# Patient Record
Sex: MALE | Race: WHITE | NOT HISPANIC OR LATINO | Employment: OTHER | URBAN - NONMETROPOLITAN AREA
[De-identification: names, ages, dates, MRNs, and addresses within clinical notes are randomized per-mention and may not be internally consistent; named-entity substitution may affect disease eponyms.]

---

## 2017-03-06 ENCOUNTER — CONVERSION ENCOUNTER (OUTPATIENT)
Dept: UROLOGY | Facility: CLINIC | Age: 82
End: 2017-03-06

## 2017-03-07 LAB — PSA SERPL-MCNC: 4.2 NG/ML (ref 0–4)

## 2017-03-08 ENCOUNTER — OFFICE VISIT (OUTPATIENT)
Dept: UROLOGY | Facility: CLINIC | Age: 82
End: 2017-03-08

## 2017-03-08 DIAGNOSIS — C61 PROSTATE CANCER (HCC): Primary | ICD-10-CM

## 2017-03-08 DIAGNOSIS — I48.91 ATRIAL FIBRILLATION (HCC): ICD-10-CM

## 2017-03-08 PROCEDURE — 99213 OFFICE O/P EST LOW 20 MIN: CPT | Performed by: UROLOGY

## 2017-03-08 NOTE — PROGRESS NOTES
Chief Complaint:          Chief Complaint   Patient presents with   • Prostate Cancer     3 month f/u       HPI:   81 y.o. male.    HPI  Pt had hifu 5 years agone and his psa zhane was 2 ine 2/15.  He psa has increased to 3 on 9/16 amd 3/9 on 12/1/16 and now in 3/17 it is 4.2      Past Medical History:        Past Medical History   Diagnosis Date   • Atrial fibrillation    • Coronary artery disease    • Hyperlipidemia    • Hypertension    • Prostate cancer          Current Meds:     Current Outpatient Prescriptions   Medication Sig Dispense Refill   • amLODIPine (NORVASC) 5 MG tablet TAKE ONE TABLET BY MOUTH DAILY **CAN INCREASE TO ONE AND ONE-HALF TABLET DAILY FOR INCREASED BLOOD PRESSURE** 45 tablet 5   • apixaban (ELIQUIS) 5 MG tablet tablet Take 1 tablet by mouth 2 (two) times a day. 60 tablet 5   • Bioflavonoid Products (SCOTT-C) 500-550 MG tablet Take  by mouth daily.     • Cholecalciferol (VITAMIN D3) 5000 UNITS capsule capsule Take  by mouth daily.     • flecainide (TAMBOCOR) 100 MG tablet Take 1 tablet by mouth 2 (two) times a day. 180 tablet 3   • folic acid (FOLVITE) 800 MCG tablet Take 800 mcg by mouth daily.     • levothyroxine (SYNTHROID, LEVOTHROID) 50 MCG tablet 50 mcg daily.     • Misc Natural Products (CURCUMAX PRO PO) Take  by mouth daily.     • niacin (NIACIN SR,NIASPAN ER) 500 MG CR capsule Take 500 mg by mouth daily.     • triamterene-hydrochlorothiazide (MAXZIDE-25) 37.5-25 MG per tablet Take 1 tablet by mouth daily.       No current facility-administered medications for this visit.         Allergies:      No Known Allergies     Past Surgical History:     Past Surgical History   Procedure Laterality Date   • Hernia repair     • Cardiac catheterization           Social History:     Social History     Social History   • Marital status:      Spouse name: N/A   • Number of children: N/A   • Years of education: N/A     Occupational History   • Not on file.     Social History Main Topics   •  Smoking status: Never Smoker   • Smokeless tobacco: Never Used   • Alcohol use No   • Drug use: No   • Sexual activity: Defer     Other Topics Concern   • Not on file     Social History Narrative       Family History:     Family History   Problem Relation Age of Onset   • Kidney disease Mother    • Sudden death Other    • Cancer Other        Review of Systems:     Review of Systems   All other systems reviewed and are negative.      Physical Exam:     Physical Exam   Constitutional: He is oriented to person, place, and time.   HENT:   Head: Normocephalic and atraumatic.   Right Ear: External ear normal.   Left Ear: External ear normal.   Nose: Nose normal.   Mouth/Throat: Oropharynx is clear and moist.   Eyes: Conjunctivae and EOM are normal. Pupils are equal, round, and reactive to light.   Neck: Normal range of motion. Neck supple. No thyromegaly present.   Cardiovascular: Normal rate, regular rhythm, normal heart sounds and intact distal pulses.    No murmur heard.  Pulmonary/Chest: Effort normal and breath sounds normal. No respiratory distress. He has no wheezes. He has no rales. He exhibits no tenderness.   Abdominal: Soft. Bowel sounds are normal. He exhibits no distension and no mass. There is no tenderness. No hernia.   Genitourinary: Rectum normal and penis normal.   Genitourinary Comments: Flat prostate.   Musculoskeletal: Normal range of motion. He exhibits no edema or tenderness.   Lymphadenopathy:     He has no cervical adenopathy.   Neurological: He is alert and oriented to person, place, and time. No cranial nerve deficit. He exhibits normal muscle tone. Coordination normal.   Skin: Skin is warm. No rash noted.   Psychiatric: He has a normal mood and affect. His behavior is normal. Judgment and thought content normal.   Nursing note and vitals reviewed.      Procedure:     No notes on file      Assessment:     Encounter Diagnoses   Name Primary?   • Atrial fibrillation    • Prostate cancer Yes     No  orders of the defined types were placed in this encounter.      Plan:   Will repeat psa in 3 months.  Consider intermittent hormonal therapy.    Counseling was given to patient for the following topics instructions for management. and the interim medical history and current results were reviewed.  A treatment plan with follow-up was made. Total time of the encounter was 21 minutes and 21 minutes were spent discussing Prostate cancer [C61] face-to-face.        This document has been electronically signed by Brett Talavera MD March 8, 2017 10:46 AM

## 2017-03-20 ENCOUNTER — OFFICE VISIT (OUTPATIENT)
Dept: CARDIOLOGY | Facility: CLINIC | Age: 82
End: 2017-03-20

## 2017-03-20 VITALS
OXYGEN SATURATION: 99 % | WEIGHT: 232.4 LBS | HEIGHT: 74 IN | SYSTOLIC BLOOD PRESSURE: 151 MMHG | HEART RATE: 69 BPM | DIASTOLIC BLOOD PRESSURE: 71 MMHG | BODY MASS INDEX: 29.82 KG/M2

## 2017-03-20 DIAGNOSIS — I10 ESSENTIAL HYPERTENSION: ICD-10-CM

## 2017-03-20 DIAGNOSIS — I48.0 PAROXYSMAL ATRIAL FIBRILLATION (HCC): Primary | ICD-10-CM

## 2017-03-20 PROCEDURE — 99213 OFFICE O/P EST LOW 20 MIN: CPT | Performed by: PHYSICIAN ASSISTANT

## 2017-03-20 PROCEDURE — 93000 ELECTROCARDIOGRAM COMPLETE: CPT | Performed by: PHYSICIAN ASSISTANT

## 2017-03-20 RX ORDER — LISINOPRIL 5 MG/1
TABLET ORAL DAILY
Refills: 0 | COMMUNITY
Start: 2017-02-23 | End: 2017-07-07

## 2017-03-20 RX ORDER — LEVOTHYROXINE SODIUM 0.07 MG/1
TABLET ORAL DAILY
Refills: 0 | COMMUNITY
Start: 2017-03-17 | End: 2017-07-07

## 2017-03-20 NOTE — PROGRESS NOTES
Problem list     Subjective   Martín Campa is a 81 y.o. male     Chief Complaint   Patient presents with   • Follow-up     4 mo.   Problem List:  1. Atrial fibrillation with RVR during hospitalization, October 2014.  1.1 Status post cardioversion, December 2014.  1.2 Residual A flutter, despite increased dosing of Flecainide.  1.3 Repeat Cardioversion, 09/2016, with maintenance of SR with Flecainide.  2. 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 Hypertension  4. Dyslipidemia      5. Prostate cancer      6. First degree AVB       HPI  The patient presents back today for routine evaluation and follow-up.  He continues to do well from cardiac standpoint.  He has a degree of weakness and dyspnea with exertion, which is his baseline.  He has no chest pain.  He has had no rhythm disturbance symptoms since his last appointment here.  He has maintained sinus rhythm with cardioversion and flecainide, all as above.  He relates to no dizziness or syncope.  He is tolerating medications without complication.  He did recently see EP services who felt he was stable as well.  The patient has no further complaints otherwise.    Current Outpatient Prescriptions   Medication Sig Dispense Refill   • amLODIPine (NORVASC) 5 MG tablet TAKE ONE TABLET BY MOUTH DAILY **CAN INCREASE TO ONE AND ONE-HALF TABLET DAILY FOR INCREASED BLOOD PRESSURE** 45 tablet 5   • apixaban (ELIQUIS) 5 MG tablet tablet Take 1 tablet by mouth 2 (two) times a day. 60 tablet 5   • Bioflavonoid Products (SCOTT-C) 500-550 MG tablet Take  by mouth daily.     • Cholecalciferol (VITAMIN D3) 5000 UNITS capsule capsule Take  by mouth daily.     • flecainide (TAMBOCOR) 100 MG tablet Take 1 tablet by mouth 2 (two) times a day. 180 tablet 3   • folic acid (FOLVITE) 800 MCG tablet Take 800 mcg by mouth daily.     • levothyroxine (SYNTHROID, LEVOTHROID) 75 MCG tablet Daily.  0   • lisinopril (PRINIVIL,ZESTRIL) 5 MG tablet Daily.  0   • Misc Natural Products (CURCUMAX  "PRO PO) Take  by mouth daily.     • niacin (NIACIN SR,NIASPAN ER) 500 MG CR capsule Take 500 mg by mouth daily.     • triamterene-hydrochlorothiazide (MAXZIDE-25) 37.5-25 MG per tablet Take 1 tablet by mouth daily.       No current facility-administered medications for this visit.        Review of patient's allergies indicates no known allergies.    Past Medical History   Diagnosis Date   • Atrial fibrillation    • Coronary artery disease    • Hyperlipidemia    • Hypertension    • Prostate cancer        Social History     Social History   • Marital status:      Spouse name: N/A   • Number of children: N/A   • Years of education: N/A     Occupational History   • Not on file.     Social History Main Topics   • Smoking status: Never Smoker   • Smokeless tobacco: Never Used   • Alcohol use No   • Drug use: No   • Sexual activity: Defer     Other Topics Concern   • Not on file     Social History Narrative       Family History   Problem Relation Age of Onset   • Kidney disease Mother    • Sudden death Other    • Cancer Other        Review of Systems   Constitutional: Positive for fatigue.   HENT: Positive for nosebleeds (left nare).    Eyes: Positive for visual disturbance (wears glasses).   Respiratory: Positive for shortness of breath (with hills).    Cardiovascular: Positive for leg swelling (mildly). Negative for chest pain and palpitations.   Gastrointestinal: Negative.    Endocrine: Negative.    Genitourinary: Negative.    Musculoskeletal: Positive for arthralgias (knees) and myalgias.   Skin: Negative.    Allergic/Immunologic: Positive for environmental allergies (fine wood dust).   Neurological: Negative.    Hematological: Bruises/bleeds easily (just left nare).   Psychiatric/Behavioral: Negative.        Objective   Visit Vitals   • /71 (BP Location: Left arm, Patient Position: Sitting)   • Pulse 69   • Ht 74\" (188 cm)   • Wt 232 lb 6.4 oz (105 kg)   • SpO2 99%   • BMI 29.84 kg/m2     Lab Results " (most recent)     None        Physical Exam   Constitutional: He is oriented to person, place, and time. He appears well-developed and well-nourished. No distress.   HENT:   Head: Normocephalic and atraumatic.   Eyes: Conjunctivae and EOM are normal. Pupils are equal, round, and reactive to light.   Neck: Normal range of motion. Neck supple. No JVD present. No tracheal deviation present.   Cardiovascular: Normal rate, regular rhythm and intact distal pulses.    Murmur (Soft SHAD) heard.   Systolic murmur is present with a grade of 1/6   Pulmonary/Chest: Effort normal and breath sounds normal.   Abdominal: Soft. Bowel sounds are normal. He exhibits no distension and no mass. There is no tenderness. There is no rebound and no guarding.   Musculoskeletal: Normal range of motion. He exhibits no edema, tenderness or deformity.   Neurological: He is alert and oriented to person, place, and time.   Skin: Skin is warm and dry. No rash noted. No erythema. No pallor.   Psychiatric: He has a normal mood and affect. His behavior is normal. Judgment and thought content normal.   Nursing note and vitals reviewed.        Procedure     ECG 12 Lead  Date/Time: 3/20/2017 9:12 AM  Performed by: CARRILLO BEYER  Authorized by: CARRILLO BEYER   Comments: Sinus rhythm with first-degree AV block, CO interval at 304 ms, normal axis, early precordial R-wave progression, no acute changes noted.               Assessment/Plan      Diagnosis Plan   1. Paroxysmal atrial fibrillation  ECG 12 Lead   2. Essential hypertension         The patient is stable from cardiac standpoint.  I will make no changes at this time.  We will see him back in 6 months, sooner if indicated by course.  He will call for issues prior to follow-up.

## 2017-04-14 RX ORDER — AMLODIPINE BESYLATE 5 MG/1
5 TABLET ORAL DAILY
Qty: 45 TABLET | Refills: 5 | Status: SHIPPED | OUTPATIENT
Start: 2017-04-14 | End: 2018-01-09 | Stop reason: SDUPTHER

## 2017-05-31 ENCOUNTER — TELEPHONE (OUTPATIENT)
Dept: UROLOGY | Facility: CLINIC | Age: 82
End: 2017-05-31

## 2017-06-01 DIAGNOSIS — C61 PROSTATE CANCER (HCC): Primary | ICD-10-CM

## 2017-06-02 LAB — PSA SERPL-MCNC: 5.6 NG/ML (ref 0–4)

## 2017-06-06 ENCOUNTER — RESULTS ENCOUNTER (OUTPATIENT)
Dept: UROLOGY | Facility: CLINIC | Age: 82
End: 2017-06-06

## 2017-06-06 DIAGNOSIS — C61 PROSTATE CANCER (HCC): ICD-10-CM

## 2017-06-07 ENCOUNTER — OFFICE VISIT (OUTPATIENT)
Dept: UROLOGY | Facility: CLINIC | Age: 82
End: 2017-06-07

## 2017-06-07 VITALS
HEIGHT: 73 IN | DIASTOLIC BLOOD PRESSURE: 73 MMHG | WEIGHT: 227 LBS | HEART RATE: 78 BPM | SYSTOLIC BLOOD PRESSURE: 136 MMHG | BODY MASS INDEX: 30.09 KG/M2

## 2017-06-07 DIAGNOSIS — N39.41 URGENCY INCONTINENCE: ICD-10-CM

## 2017-06-07 DIAGNOSIS — C61 PROSTATE CANCER (HCC): Primary | ICD-10-CM

## 2017-06-07 PROCEDURE — 99213 OFFICE O/P EST LOW 20 MIN: CPT | Performed by: UROLOGY

## 2017-06-14 RX ORDER — FLECAINIDE ACETATE 100 MG/1
100 TABLET ORAL 2 TIMES DAILY
Qty: 180 TABLET | Refills: 3 | Status: SHIPPED | OUTPATIENT
Start: 2017-06-14 | End: 2018-03-19 | Stop reason: SDUPTHER

## 2017-06-15 DIAGNOSIS — I48.91 ATRIAL FIBRILLATION (HCC): ICD-10-CM

## 2017-06-15 RX ORDER — APIXABAN 5 MG/1
TABLET, FILM COATED ORAL
Qty: 60 TABLET | Refills: 5 | Status: SHIPPED | OUTPATIENT
Start: 2017-06-15 | End: 2017-06-16 | Stop reason: SDUPTHER

## 2017-06-16 DIAGNOSIS — I48.0 PAROXYSMAL ATRIAL FIBRILLATION (HCC): ICD-10-CM

## 2017-07-07 ENCOUNTER — OFFICE VISIT (OUTPATIENT)
Dept: CARDIOLOGY | Facility: CLINIC | Age: 82
End: 2017-07-07

## 2017-07-07 VITALS
BODY MASS INDEX: 29.16 KG/M2 | HEIGHT: 73 IN | HEART RATE: 49 BPM | SYSTOLIC BLOOD PRESSURE: 129 MMHG | DIASTOLIC BLOOD PRESSURE: 60 MMHG | WEIGHT: 220 LBS

## 2017-07-07 DIAGNOSIS — I48.0 PAROXYSMAL ATRIAL FIBRILLATION (HCC): Primary | ICD-10-CM

## 2017-07-07 DIAGNOSIS — I10 ESSENTIAL HYPERTENSION: ICD-10-CM

## 2017-07-07 PROCEDURE — 93000 ELECTROCARDIOGRAM COMPLETE: CPT | Performed by: PHYSICIAN ASSISTANT

## 2017-07-07 PROCEDURE — 99212 OFFICE O/P EST SF 10 MIN: CPT | Performed by: PHYSICIAN ASSISTANT

## 2017-07-07 RX ORDER — LISINOPRIL 5 MG/1
5 TABLET ORAL DAILY
COMMUNITY
End: 2022-10-13 | Stop reason: SDUPTHER

## 2017-07-07 RX ORDER — LEVOTHYROXINE SODIUM 0.05 MG/1
50 TABLET ORAL DAILY
COMMUNITY
End: 2017-09-18 | Stop reason: DRUGHIGH

## 2017-07-07 NOTE — PROGRESS NOTES
Subjective:   Martín Campa  1935  [phone]      07/07/2017    [facility] CARDIOLOGY    [place]atica MD  [address] [place] [id number]    REFERRING DOCTOR: Dr. Rosairo      Patient ID: Martín Campa is a 82 y.o. male resident of [place], [place]    Chief Complaint: Afib.    Problem List:  1. Atrial fibrillation with RVR during hospitalization, October 2014.   A. Status post cardioversion, December 2014. EF 60-65%   B. Had been initially started on amiodarone but stopped about one year ago (was on for 10 months) due to thyroid issues, on synthroid   C. Residual A flutter/Afib, despite increased dosing of Flecainide now on 100 mg BID. No recent cardioversions   D. CHADS-VASC= 3, On Eliquis 5mg BID. Recently decreased to 2.5 mg BID due to nose bleeds   E. On Eliquis 5 mg BID, Flec 100 mg BID ECV in Sept 2016. Maintaining SR and not feeling any real difference in Afib vs SR      2. 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 Hypertension  4. Dyslipidemia      5. Prostate cancer      No Known Allergies    Current Outpatient Prescriptions:   •  amLODIPine (NORVASC) 5 MG tablet, Take 1 tablet by mouth Daily., Disp: 45 tablet, Rfl: 5  •  apixaban (ELIQUIS) 5 MG tablet tablet, Take 1 tablet by mouth Every 12 (Twelve) Hours., Disp: 60 tablet, Rfl: 5  •  Bioflavonoid Products (SCOTT-C) 500-550 MG tablet, Take 1 tablet by mouth Daily., Disp: , Rfl:   •  Cholecalciferol (VITAMIN D3) 5000 UNITS capsule capsule, Take 5,000 Units by mouth Daily., Disp: , Rfl:   •  flecainide (TAMBOCOR) 100 MG tablet, Take 1 tablet by mouth 2 (Two) Times a Day., Disp: 180 tablet, Rfl: 3  •  folic acid (FOLVITE) 800 MCG tablet, Take 800 mcg by mouth daily., Disp: , Rfl:   •  levothyroxine (SYNTHROID, LEVOTHROID) 50 MCG tablet, Take 50 mcg by mouth Daily., Disp: , Rfl:   •  lisinopril (PRINIVIL,ZESTRIL) 5 MG tablet, Take 5 mg by mouth Daily., Disp: , Rfl:   •  Misc Natural Products (CURCUMAX PRO  "PO), Take  by mouth daily., Disp: , Rfl:   •  niacin (NIACIN SR,NIASPAN ER) 500 MG CR capsule, Take 500 mg by mouth daily., Disp: , Rfl:   •  triamterene-hydrochlorothiazide (MAXZIDE-25) 37.5-25 MG per tablet, Take 1 tablet by mouth daily., Disp: , Rfl:     History of Present Illness  The patient presents for follow up regarding history of afib.  He denies any recurrent episodes of afib.  He denies any palpiations, dizziness, near syncpope events.  He is having trouble with some prostate issues but otherwise he is doing well.       The following portions of the patient's history were reviewed and updated as appropriate: allergies, current medications, past family history, past medical history, past social history, past surgical history and problem list.    ROS   14 point ROS negative except as outlined in problem list, HPI and other parts of the note.      ECG 12 Lead  Date/Time: 7/7/2017 11:48 AM  Performed by: VAISHALI DÍAZ  Authorized by: VAISHALI DÍAZ   Rhythm: sinus rhythm  Ectopy: atrial premature contractions  Rate: normal  Conduction: conduction normal  Conduction: 1st degree  ST Segments: ST segments normal  T Waves: T waves normal  QRS axis: normal  Other: no other findings  Clinical impression: dysrhythmia - atrial               Objective:       Vitals:    07/07/17 1120   BP: 129/60   BP Location: Left arm   Patient Position: Sitting   Pulse: (!) 49   Weight: 220 lb (99.8 kg)   Height: 73\" (185.4 cm)       GENERAL: Well-developed, well-nourished patient in no acute distress.  HEENT: Normocephalic, atraumatic, PERRLA. Moist mucous membranes.  NECK: No JVD present at 30°. No carotid bruits auscultated.  LUNGS: Clear to auscultation.  CARDIOVASCULAR: Heart has a regular rate and rhythm. No murmurs, gallops or rubs noted.   ABDOMEN: Soft, nontender. Positive bowel sounds.  MUSCULOSKELETAL: No gross deformities. No clubbing, cyanosis, or lower extremity edema.  SKIN: Pink, warm  Neuro: Nonfocal exam. " Gait intact  Ext: No edema or bruising    The patient's old records including ambulatory rhythm recordings (ECGs, Holter/event monitor) were reviewed and discussed.      Lab Review:   Results for orders placed or performed in visit on 06/01/17   PSA   Result Value Ref Range    PSA 5.6 (H) 0.0 - 4.0 ng/mL           Diagnosis:   1. PAF - Controlled on Tambocor.  He is tolerating the medication well.    2. HTN- Controlled.       Assessment & Plan:     · Continue current medical therapy  · Return for follow up in 6 months or sooner as needed.            JESS Roger  07/07/17  11:46 AM

## 2017-09-05 ENCOUNTER — DOCUMENTATION (OUTPATIENT)
Dept: UROLOGY | Facility: CLINIC | Age: 82
End: 2017-09-05

## 2017-09-05 ENCOUNTER — OFFICE VISIT (OUTPATIENT)
Dept: UROLOGY | Facility: CLINIC | Age: 82
End: 2017-09-05

## 2017-09-05 VITALS — WEIGHT: 220 LBS | HEIGHT: 73 IN | BODY MASS INDEX: 29.16 KG/M2

## 2017-09-05 DIAGNOSIS — C61 PROSTATE CANCER (HCC): Primary | ICD-10-CM

## 2017-09-05 PROCEDURE — 99214 OFFICE O/P EST MOD 30 MIN: CPT | Performed by: UROLOGY

## 2017-09-05 PROCEDURE — 96402 CHEMO HORMON ANTINEOPL SQ/IM: CPT | Performed by: UROLOGY

## 2017-09-05 NOTE — PROGRESS NOTES
Chief Complaint:          Chief Complaint   Patient presents with   • Prostate Cancer     3 month follow up        HPI:   82 y.o. male.  Pt had HiFu for prostate cancer and his psa increased from 4.2 to 5.6 on 6/2017 this was done on June.  He has had lupron prior to his HIFU.  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  With him being 82 years old he and I agreed that radiation therapy would likely cause decreased quality of life compared to hormonal therapy with lupron.  HPI        Past Medical History:        Past Medical History:   Diagnosis Date   • Atrial fibrillation    • Coronary artery disease    • Hyperlipidemia    • Hypertension    • Prostate cancer          Current Meds:     Current Outpatient Prescriptions   Medication Sig Dispense Refill   • amLODIPine (NORVASC) 5 MG tablet Take 1 tablet by mouth Daily. 45 tablet 5   • apixaban (ELIQUIS) 5 MG tablet tablet Take 1 tablet by mouth Every 12 (Twelve) Hours. 60 tablet 5   • Bioflavonoid Products (SCOTT-C) 500-550 MG tablet Take 1 tablet by mouth Daily.     • Cholecalciferol (VITAMIN D3) 5000 UNITS capsule capsule Take 5,000 Units by mouth Daily.     • flecainide (TAMBOCOR) 100 MG tablet Take 1 tablet by mouth 2 (Two) Times a Day. 180 tablet 3   • folic acid (FOLVITE) 800 MCG tablet Take 800 mcg by mouth daily.     • levothyroxine (SYNTHROID, LEVOTHROID) 50 MCG tablet Take 50 mcg by mouth Daily.     • lisinopril (PRINIVIL,ZESTRIL) 5 MG tablet Take 5 mg by mouth Daily.     • Misc Natural Products (CURCUMAX PRO PO) Take  by mouth daily.     • niacin (NIACIN SR,NIASPAN ER) 500 MG CR capsule Take 500 mg by mouth daily.     • triamterene-hydrochlorothiazide (MAXZIDE-25) 37.5-25 MG per tablet Take 1 tablet by mouth daily.       No current facility-administered medications for this visit.         Allergies:      No Known Allergies     Past Surgical History:     Past Surgical History:   Procedure Laterality Date   •  CARDIAC CATHETERIZATION     • HERNIA REPAIR           Social History:     Social History     Social History   • Marital status:      Spouse name: N/A   • Number of children: N/A   • Years of education: N/A     Occupational History   • Not on file.     Social History Main Topics   • Smoking status: Never Smoker   • Smokeless tobacco: Never Used   • Alcohol use No   • Drug use: No   • Sexual activity: Defer     Other Topics Concern   • Not on file     Social History Narrative       Family History:     Family History   Problem Relation Age of Onset   • Kidney disease Mother    • Sudden death Other    • Cancer Other        Review of Systems:     Review of Systems    Physical Exam:     Physical Exam    Procedure:     No notes on file      Assessment:     Encounter Diagnosis   Name Primary?   • Prostate cancer Yes     No orders of the defined types were placed in this encounter.      Plan:   Will plan on lupron and will repeat his psa in 6 months    Counseling was given to patient for the following topics instructions for management. and the interim medical history and current results were reviewed.  A treatment plan with follow-up was made. Total time of the encounter was 21 minutes and 21 minutes were spent discussing Prostate cancer [C61] face-to-face.       This document has been electronically signed by Brett Talavera MD September 5, 2017 11:15 AM

## 2017-09-05 NOTE — PROGRESS NOTES
I called patients insurance and his 6 month lupron was approved for 1 year. Auth# [id number] 9/5/17 thru 9/5/18.

## 2017-09-06 DIAGNOSIS — N30.01 ACUTE CYSTITIS WITH HEMATURIA: Primary | ICD-10-CM

## 2017-09-06 RX ORDER — CIPROFLOXACIN 250 MG/1
250 TABLET, FILM COATED ORAL 2 TIMES DAILY
Qty: 20 TABLET | Refills: 0 | Status: SHIPPED | OUTPATIENT
Start: 2017-09-06 | End: 2017-09-18

## 2017-09-18 ENCOUNTER — OFFICE VISIT (OUTPATIENT)
Dept: CARDIOLOGY | Facility: CLINIC | Age: 82
End: 2017-09-18

## 2017-09-18 VITALS
SYSTOLIC BLOOD PRESSURE: 122 MMHG | HEIGHT: 73 IN | HEART RATE: 66 BPM | DIASTOLIC BLOOD PRESSURE: 78 MMHG | OXYGEN SATURATION: 100 % | WEIGHT: 226.6 LBS | BODY MASS INDEX: 30.03 KG/M2

## 2017-09-18 DIAGNOSIS — I10 ESSENTIAL HYPERTENSION: ICD-10-CM

## 2017-09-18 DIAGNOSIS — I25.10 CORONARY ARTERIOSCLEROSIS IN NATIVE ARTERY: Primary | ICD-10-CM

## 2017-09-18 DIAGNOSIS — I48.0 PAROXYSMAL ATRIAL FIBRILLATION (HCC): ICD-10-CM

## 2017-09-18 PROCEDURE — 99213 OFFICE O/P EST LOW 20 MIN: CPT | Performed by: PHYSICIAN ASSISTANT

## 2017-09-18 RX ORDER — LEVOTHYROXINE SODIUM 0.07 MG/1
75 TABLET ORAL DAILY
Refills: 0 | COMMUNITY
Start: 2017-08-14

## 2017-09-18 NOTE — PROGRESS NOTES
Problem list     Subjective   Martín Campa is a 82 y.o. male     Chief Complaint   Patient presents with   • Atrial Fibrillation     Here for 6 mo. f/u   • Coronary Artery Disease   • Hypertension   • Hyperlipidemia   Problem List:  1. Atrial fibrillation with RVR during hospitalization, October 2014.  1.1 Status post cardioversion, December 2014.  1.2 Residual A flutter, despite increased dosing of Flecainide.  1.3 Repeat Cardioversion, 09/2016, with maintenance of SR with Flecainide.  2. 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 Hypertension  4. Dyslipidemia      5. Prostate cancer      6. First degree AVB    HPI  The patient presents back today for evaluation follow-up.  Since last evaluation, he continues to do well.  He does have at least 2 episodes of epistaxis per week.  He has average that since starting anticoagulation therapy.  Currently, the patient relates to no chest pain.  He reports stable dyspnea.  He does not have dysrhythmic symptoms at this time.  He has no dizziness or syncope.  Blood pressures of been extremely well-controlled as of recent.  He has a component of lower extremity edema which is minimal by his report.  He had labs in March from his primary care provider and quotes as being normal, in particular his CBC, chemistry, TSH, and lipid parameters.  The patient has no further complaints otherwise and feels that he is doing well at this time.    Current Outpatient Prescriptions   Medication Sig Dispense Refill   • amLODIPine (NORVASC) 5 MG tablet Take 1 tablet by mouth Daily. 45 tablet 5   • apixaban (ELIQUIS) 5 MG tablet tablet Take 1 tablet by mouth Every 12 (Twelve) Hours. 60 tablet 5   • Bioflavonoid Products (SCOTT-C) 500-550 MG tablet Take 1 tablet by mouth Daily.     • Cholecalciferol (VITAMIN D3) 5000 UNITS capsule capsule Take 5,000 Units by mouth Daily.     • flecainide (TAMBOCOR) 100 MG tablet Take 1 tablet by mouth 2 (Two) Times a Day. 180 tablet 3   • folic acid  (FOLVITE) 800 MCG tablet Take 800 mcg by mouth daily.     • levothyroxine (SYNTHROID, LEVOTHROID) 75 MCG tablet Daily.  0   • lisinopril (PRINIVIL,ZESTRIL) 5 MG tablet Take 5 mg by mouth Daily.     • Misc Natural Products (CURCUMAX PRO PO) Take  by mouth daily.     • niacin (NIACIN SR,NIASPAN ER) 500 MG CR capsule Take 500 mg by mouth daily.     • triamterene-hydrochlorothiazide (MAXZIDE-25) 37.5-25 MG per tablet Take 1 tablet by mouth daily.       No current facility-administered medications for this visit.        Review of patient's allergies indicates no known allergies.    Past Medical History:   Diagnosis Date   • Atrial fibrillation    • Coronary artery disease    • Hyperlipidemia    • Hypertension    • Prostate cancer        Social History     Social History   • Marital status:      Spouse name: N/A   • Number of children: N/A   • Years of education: N/A     Occupational History   • Not on file.     Social History Main Topics   • Smoking status: Never Smoker   • Smokeless tobacco: Never Used   • Alcohol use No   • Drug use: No   • Sexual activity: Defer     Other Topics Concern   • Not on file     Social History Narrative       Family History   Problem Relation Age of Onset   • Kidney disease Mother    • Sudden death Other    • Cancer Other        Review of Systems   Constitutional: Negative.    HENT: Positive for nosebleeds (at least a couple x weekly).    Eyes: Positive for visual disturbance (wears glasses).   Respiratory: Negative.    Cardiovascular: Positive for leg swelling (slightly). Negative for chest pain and palpitations.   Gastrointestinal: Negative.    Endocrine: Negative.    Genitourinary: Negative.    Musculoskeletal: Positive for arthralgias and myalgias.   Skin: Negative.    Allergic/Immunologic: Negative.    Neurological: Negative.    Hematological: Bruises/bleeds easily.   Psychiatric/Behavioral: Negative.        Objective   /78 (BP Location: Left arm, Patient Position:  "Sitting)  Pulse 66  Ht 73\" (185.4 cm)  Wt 226 lb 9.6 oz (103 kg)  SpO2 100%  BMI 29.9 kg/m2  Lab Results (most recent)     None        Physical Exam   Constitutional: He is oriented to person, place, and time. He appears well-developed and well-nourished. No distress.   HENT:   Head: Normocephalic and atraumatic.   Eyes: Conjunctivae and EOM are normal. Pupils are equal, round, and reactive to light.   Neck: Normal range of motion. Neck supple. No JVD present. No tracheal deviation present.   Cardiovascular: Normal rate, regular rhythm and intact distal pulses.    Murmur (Soft SHAD) heard.   Systolic murmur is present with a grade of 1/6   Pulmonary/Chest: Effort normal and breath sounds normal.   Abdominal: Soft. Bowel sounds are normal. He exhibits no distension and no mass. There is no tenderness. There is no rebound and no guarding.   Musculoskeletal: Normal range of motion. He exhibits no edema, tenderness or deformity.   Neurological: He is alert and oriented to person, place, and time.   Skin: Skin is warm and dry. No rash noted. No erythema. No pallor.   Psychiatric: He has a normal mood and affect. His behavior is normal. Judgment and thought content normal.   Nursing note and vitals reviewed.        Procedure   Procedures       Assessment/Plan      Diagnosis Plan   1. Coronary arteriosclerosis in native artery     2. Paroxysmal atrial fibrillation     3. Essential hypertension         The patient is stable at this time.  I will make no changes in medications.  He does have a degree of lower extremity edema which I'll fill at least in part is related amlodipine therapy.  I discussed how to titrate that medication down if needed in the future.  He can increase lisinopril for better blood pressure control should he need to attempt that.  He is also interested in the consideration for Pradaxa therapy.  He is interested in that because recurrent reversal agent and his concern of bleed, given that he is a " [employer].  He would like to do some research on that and will call back should he decide to change that therapy.  Otherwise, nothing further to this time.  We will see him back in 6 months.

## 2017-10-02 ENCOUNTER — TELEPHONE (OUTPATIENT)
Dept: CARDIOLOGY | Facility: CLINIC | Age: 82
End: 2017-10-02

## 2017-10-02 NOTE — TELEPHONE ENCOUNTER
I spoke with Sha Richards PA-C regarding this patient, per verbal order patient was to restart medication today. I asked patient what [facility] had discussed with him, patient stated they do not think this was related to the Eliquis. They thought it was due to B/P event and dry sinus cavity. Patient stated for now he would like to continue Eliquis and does not want to change to Prodaxa at this time. Patient was notified if he had any further questions/issues to call our office, patient had no further question. -; SHON

## 2017-10-02 NOTE — TELEPHONE ENCOUNTER
Patient called stating he went to  and had nose packed due to bleeding. They notified patient to hold Eliquis till today and speak with our office about side effects and when to restart medications.

## 2018-01-09 RX ORDER — AMLODIPINE BESYLATE 5 MG/1
TABLET ORAL
Qty: 90 TABLET | Refills: 1 | Status: SHIPPED | OUTPATIENT
Start: 2018-01-09 | End: 2018-05-24 | Stop reason: SDUPTHER

## 2018-01-10 ENCOUNTER — OFFICE VISIT (OUTPATIENT)
Dept: CARDIOLOGY | Facility: CLINIC | Age: 83
End: 2018-01-10

## 2018-01-10 VITALS
WEIGHT: 223.8 LBS | OXYGEN SATURATION: 96 % | SYSTOLIC BLOOD PRESSURE: 122 MMHG | HEIGHT: 72 IN | BODY MASS INDEX: 30.31 KG/M2 | DIASTOLIC BLOOD PRESSURE: 69 MMHG | HEART RATE: 68 BPM

## 2018-01-10 DIAGNOSIS — R42 DIZZINESS: ICD-10-CM

## 2018-01-10 DIAGNOSIS — I48.92 ATRIAL FLUTTER, UNSPECIFIED TYPE (HCC): ICD-10-CM

## 2018-01-10 DIAGNOSIS — I10 ESSENTIAL HYPERTENSION: ICD-10-CM

## 2018-01-10 DIAGNOSIS — R06.02 SHORTNESS OF BREATH: ICD-10-CM

## 2018-01-10 DIAGNOSIS — I25.10 CORONARY ARTERIOSCLEROSIS IN NATIVE ARTERY: ICD-10-CM

## 2018-01-10 DIAGNOSIS — E78.5 DYSLIPIDEMIA: ICD-10-CM

## 2018-01-10 DIAGNOSIS — I48.0 PAROXYSMAL ATRIAL FIBRILLATION (HCC): Primary | ICD-10-CM

## 2018-01-10 DIAGNOSIS — R55 SYNCOPE, UNSPECIFIED SYNCOPE TYPE: ICD-10-CM

## 2018-01-10 PROCEDURE — 99214 OFFICE O/P EST MOD 30 MIN: CPT | Performed by: INTERNAL MEDICINE

## 2018-01-10 NOTE — PROGRESS NOTES
Subjective   Martín Campa is a 82 y.o. male     Chief Complaint   Patient presents with   • Loss of Consciousness     1 episode 2 weeks ago while walking, felt dizzy followed by syncope        PROBLEM LIST:     1. Atrial fibrillation with RVR during hospitalization, October 2014.  1.1 Status post cardioversion, December 2014.  1.2 Residual A flutter, despite increased dosing of Flecainide.  1.3 Repeat Cardioversion, 09/2016, with maintenance of SR with Flecainide.  2. 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 Hypertension  4. Dyslipidemia      5. Prostate cancer      6. First degree AVB      7. Syncope     Specialty Problems        Cardiology Problems    Atrial fibrillation        Coronary arteriosclerosis in native artery        Hypertension        Atrial flutter                HPI:  Mr. Campa returns today for follow-up because of an episode of syncope.    He was in his usual state of good health until several weeks ago.  He worked out on a stationary bicycle around lunchtime, having eaten only prior to that time.  At approximate 5 PM he was walking in the grocery store, suddenly became lightheaded, and awoke on the floor.  He describes no focal neurologic deficits prior to the episode of loss of consciousness.  He experienced no palpitations.  He was not vertiginous.  Per his description he was unconscious for only a very short period of time and he awoke without Rob's paralysis or post ictal state.    Mr. Campa  never had a similar episode previously.  However, since that episode he hasn't noted mild orthostatic lightheadedness.  He has had nor orthostatic symptoms prior.  He continues to be without chest discomfort, orthopnea, PND, or lower extremity edema.  Although he had had relatively frequent nosebleeds in the past, he has had no nosebleeds for the past month or more.  He has been compliant with medications.  He had no antecedent illnesses, injuries, or medication change.              CURRENT  MEDICATION:    Current Outpatient Prescriptions   Medication Sig Dispense Refill   • amLODIPine (NORVASC) 5 MG tablet take 1 tablet by mouth once daily 90 tablet 1   • apixaban (ELIQUIS) 5 MG tablet tablet Take 1 tablet by mouth Every 12 (Twelve) Hours. 60 tablet 5   • Cholecalciferol (VITAMIN D3) 5000 UNITS capsule capsule Take 5,000 Units by mouth Daily.     • flecainide (TAMBOCOR) 100 MG tablet Take 1 tablet by mouth 2 (Two) Times a Day. 180 tablet 3   • folic acid (FOLVITE) 800 MCG tablet Take 800 mcg by mouth daily.     • levothyroxine (SYNTHROID, LEVOTHROID) 75 MCG tablet Daily.  0   • lisinopril (PRINIVIL,ZESTRIL) 5 MG tablet Take 5 mg by mouth Daily.     • Misc Natural Products (CURCUMAX PRO PO) Take  by mouth daily.     • niacin (NIACIN SR,NIASPAN ER) 500 MG CR capsule Take 500 mg by mouth daily.     • triamterene-hydrochlorothiazide (MAXZIDE-25) 37.5-25 MG per tablet Take 1 tablet by mouth daily.     • Bioflavonoid Products (SCOTT-C) 500-550 MG tablet Take 1 tablet by mouth Daily.       No current facility-administered medications for this visit.        ALLERGIES:    Review of patient's allergies indicates no known allergies.    PAST MEDICAL HISTORY:    Past Medical History:   Diagnosis Date   • Atrial fibrillation    • Coronary artery disease    • Hyperlipidemia    • Hypertension    • Prostate cancer        SURGICAL HISTORY:    Past Surgical History:   Procedure Laterality Date   • CARDIAC CATHETERIZATION     • HERNIA REPAIR         SOCIAL HISTORY:    Social History     Social History   • Marital status:      Spouse name: N/A   • Number of children: N/A   • Years of education: N/A     Occupational History   • Not on file.     Social History Main Topics   • Smoking status: Never Smoker   • Smokeless tobacco: Never Used   • Alcohol use No   • Drug use: No   • Sexual activity: Defer     Other Topics Concern   • Not on file     Social History Narrative       FAMILY HISTORY:    Family History   Problem  "Relation Age of Onset   • Kidney disease Mother    • Sudden death Other    • Cancer Other        Review of Systems   Constitutional: Positive for fever (onset day after syncope epsiode ). Negative for chills, diaphoresis, fatigue and unexpected weight change.   HENT: Positive for nosebleeds (frequent. last episode 1 month ago ).    Eyes: Positive for visual disturbance (wears glasses).   Respiratory: Positive for cough (dry cough x 2 weeks ) and shortness of breath (dyspnea with exertion). Negative for choking, chest tightness, wheezing and stridor.    Cardiovascular: Positive for leg swelling (mild BLE). Negative for chest pain and palpitations.   Gastrointestinal: Positive for nausea (stomach virus day after syncope ) and vomiting. Negative for abdominal pain, blood in stool (no melena, no hematuria, no hematemesis, no hematochezia), constipation and diarrhea.   Endocrine: Negative.  Negative for cold intolerance and heat intolerance.   Genitourinary: Negative.    Musculoskeletal: Positive for arthralgias. Negative for myalgias.   Skin: Negative.  Negative for color change, pallor, rash and wound.   Neurological: Positive for dizziness, syncope (1 episode 2 weeks ago[facility] experienced dizziness with syncope. EMT arrived and Bp was 80/40  had head injury with 7 stables ) and light-headedness. Negative for tremors, seizures, facial asymmetry, speech difficulty, weakness, numbness and headaches.   Hematological: Negative.    Psychiatric/Behavioral: Negative.        VISIT VITALS:  Vitals:    01/10/18 1306 01/10/18 1308 01/10/18 1310   BP: 137/76 119/73 122/69   BP Location: Left arm Left arm Left arm   Patient Position: Lying Sitting Standing   Pulse: 71 68 68   SpO2: 96%     Weight: 102 kg (223 lb 12.8 oz)     Height: 182.9 cm (72\")        /69 (BP Location: Left arm, Patient Position: Standing)  Pulse 68  Ht 182.9 cm (72\")  Wt 102 kg (223 lb 12.8 oz)  SpO2 96%  BMI 30.35 " kg/m2    RECENT LABS:    Objective       Physical Exam   Constitutional: He is oriented to person, place, and time. He appears well-developed and well-nourished. No distress.   HENT:   Head: Normocephalic and atraumatic.   Eyes: Conjunctivae and EOM are normal. Pupils are equal, round, and reactive to light.   Neck: Normal range of motion. Neck supple. Normal carotid pulses, no hepatojugular reflux and no JVD present. Carotid bruit is not present (normal carotid upstrokes ). No tracheal deviation present. No thyroid mass and no thyromegaly present.   Cardiovascular: Normal rate, regular rhythm and intact distal pulses.  Exam reveals distant heart sounds. Exam reveals no gallop and no friction rub.    No murmur heard.  Pulmonary/Chest: Effort normal and breath sounds normal. No respiratory distress. He has no decreased breath sounds. He has no wheezes. He has no rhonchi. He has no rales. He exhibits no tenderness.   Abdominal: Soft. Bowel sounds are normal. He exhibits no distension, no abdominal bruit and no mass. There is no tenderness. There is no rebound and no guarding.   Negative organomegaly      Musculoskeletal: Normal range of motion. He exhibits edema (1+ edema to sockline BLE, mild venostasis changes BLE). He exhibits no tenderness or deformity.   Lymphadenopathy:     He has no cervical adenopathy.     He has no axillary adenopathy.   Neurological: He is alert and oriented to person, place, and time. He has normal reflexes.   Skin: Skin is warm and dry. No rash noted. No erythema. No pallor.   Psychiatric: He has a normal mood and affect. His speech is normal and behavior is normal. Judgment and thought content normal. Cognition and memory are normal.   Nursing note and vitals reviewed.      Procedures      Assessment/Plan    #1.  Syncope.  This is likely a Dewitt Lange attack in this patient with known conduction system disease.  However, other etiologies need to be determined.  The patient describes no  symptoms to suggest carotid sinus hypersensitivity, and no symptoms of upper extremity claudication 2 impugn  subclavian steal.    #2.  Therefore, we'll have the patient wear thirty-day event monitor.    #3.  I would like to check a d-dimer is non-obtained during the patient's emergency room visit.    #4.  As this was an isolated event, may have been related to the patient's food intake (or lack thereof, if there is no evidence of significant tachycardia or bradycardia dysrhythmias, I would not feel compelled to pursue an etiology further.  However, if symptoms recur or thrill evaluation to include risk assessment from the standpoint of ischemia, evaluating LV systolic and diastolic and pulmonary pressures would need to be considered.    #5.  Mr. Campa will follow-up with Dr. Gatica as instructed, and in our office after testing or on a when necessary basis for symptoms as discussed.                 Diagnosis Plan   1. Paroxysmal atrial fibrillation     2. Essential hypertension     3. Dyslipidemia     4. Syncope, unspecified syncope type     5. Shortness of breath     6. Dizziness         No Follow-up on file.         Denise Rosa MA   Scribed for Dr. Nahid Rosario by CHANTE Ta January 10, 2018 1:42 PM               Electronically signed by:            This note is dictated utilizing voice recognition software.  Although this record has been proof read, transcriptional errors may still be present. If questions occur regarding the content of this record please do not hesitate to call our office.

## 2018-02-02 ENCOUNTER — OFFICE VISIT (OUTPATIENT)
Dept: CARDIOLOGY | Facility: CLINIC | Age: 83
End: 2018-02-02

## 2018-02-02 VITALS
HEART RATE: 75 BPM | WEIGHT: 217 LBS | BODY MASS INDEX: 28.76 KG/M2 | SYSTOLIC BLOOD PRESSURE: 142 MMHG | DIASTOLIC BLOOD PRESSURE: 64 MMHG | HEIGHT: 73 IN

## 2018-02-02 DIAGNOSIS — I10 ESSENTIAL HYPERTENSION: ICD-10-CM

## 2018-02-02 DIAGNOSIS — I48.0 PAROXYSMAL ATRIAL FIBRILLATION (HCC): Primary | ICD-10-CM

## 2018-02-02 DIAGNOSIS — R55 SYNCOPE, UNSPECIFIED SYNCOPE TYPE: ICD-10-CM

## 2018-02-02 PROCEDURE — 99213 OFFICE O/P EST LOW 20 MIN: CPT | Performed by: INTERNAL MEDICINE

## 2018-02-02 NOTE — PROGRESS NOTES
Subjective:   Martín Campa  1935  [phone]      02/02/2018    [facility] CARDIOLOGY    West Gatica MD  [address] [place], [place] [id number]      Patient ID: Martín Campa is a 82 y.o. male.    Chief Complaint:   Chief Complaint   Patient presents with   • Atrial Fibrillation     Problem List:  1. Atrial fibrillation with RVR during hospitalization, October 2014.                    A. Status post cardioversion, December 2014. EF 60-65%                    B. Had been initially started on amiodarone but stopped about one year ago (was on for 10 months) due to thyroid issues, on synthroid                    C. Residual A flutter/Afib, despite increased dosing of Flecainide now on 100 mg BID. No recent cardioversions                    D. CHADS-VASC= 3, On Eliquis 5mg BID. Recently decreased to 2.5 mg BID due to nose bleeds                    E. On Eliquis 5 mg BID, Flec 100 mg BID ECV in Sept 2016. Maintaining SR and not feeling any real difference in Afib vs SR      2. 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 Hypertension  4. Dyslipidemia      5. Prostate cancer    No Known Allergies    Current Outpatient Prescriptions:   •  amLODIPine (NORVASC) 5 MG tablet, take 1 tablet by mouth once daily, Disp: 90 tablet, Rfl: 1  •  apixaban (ELIQUIS) 5 MG tablet tablet, Take 1 tablet by mouth Every 12 (Twelve) Hours., Disp: 60 tablet, Rfl: 5  •  Bioflavonoid Products (SCOTT-C) 500-550 MG tablet, Take 1 tablet by mouth Daily., Disp: , Rfl:   •  Cholecalciferol (VITAMIN D3) 5000 UNITS capsule capsule, Take 5,000 Units by mouth Daily., Disp: , Rfl:   •  flecainide (TAMBOCOR) 100 MG tablet, Take 1 tablet by mouth 2 (Two) Times a Day., Disp: 180 tablet, Rfl: 3  •  folic acid (FOLVITE) 800 MCG tablet, Take 800 mcg by mouth daily., Disp: , Rfl:   •  levothyroxine (SYNTHROID, LEVOTHROID) 75 MCG tablet, Daily., Disp: , Rfl: 0  •  lisinopril (PRINIVIL,ZESTRIL) 5 MG tablet, Take 5 mg  "by mouth Daily., Disp: , Rfl:   •  niacin (NIACIN SR,NIASPAN ER) 500 MG CR capsule, Take 500 mg by mouth daily., Disp: , Rfl:   •  triamterene-hydrochlorothiazide (MAXZIDE-25) 37.5-25 MG per tablet, Take 1 tablet by mouth daily., Disp: , Rfl:     History of Present Illness:   Mr. Campa is an 82 year old white male with pmhx as noted above. He presents for follow regarding history of afib.  He denies any recurrent episodes of afib. He reports one episode of syncope in January for which he saw Dr. Rosario. He is currently wearing a 30 day event monitor. He has had recurrent episodes of dizziness which was exacerbated by \"standing up too quickly\". His blood pressures at home systolic are in the 140-160's.He denies with chest pain, shortness of breath, fevers, chills, night sweats, PND, orthopnea or palpitations. Denies any stroke-like symptoms. EKG from today was reviewed with acceptable QRS. He continues to take Flecainide and Eliquis. He reports that he does not stay well-hydrated. He denies drinking caffeine or alcohol. TSH is being monitored by PCP.   He states that the time of syncope occurred right after working out and at the store. He didn't eat for sometime and felt like he was dehydrated. Got LH and then passed out. No neuro issues noted.     The following portions of the patient's history were reviewed and updated as appropriate: allergies, current medications, past family history, past medical history, past social history, past surgical history and problem list.    ROS   14 point ROS negative except as outlined in problem list, HPI and other parts of the note.    Procedures       Objective:       Vitals:    02/02/18 1214   BP: 142/64   BP Location: Left arm   Patient Position: Sitting   Pulse: 75   Weight: 98.4 kg (217 lb)   Height: 185.4 cm (73\")     Physical Exam:   GENERAL: Well-developed, well-nourished patient in no acute distress.  HEENT: Normocephalic, atraumatic, PERRLA. Moist mucous " membranes.  NECK: No JVD present at 30°. No carotid bruits auscultated.  LUNGS: Clear to auscultation. No rales, rhonchi or wheezes.   CARDIOVASCULAR: Heart has a regular rate and rhythm. No murmurs, gallops or rubs noted.   ABDOMEN: Soft, nontender. Positive bowel sounds.  MUSCULOSKELETAL: No gross deformities. No clubbing, cyanosis, or lower extremity edema.  SKIN: Pink, warm  Neuro: Nonfocal exam. Gait intact  Ext: No edema or bruising    The patient's old records including ambulatory rhythm recordings (ECGs, Holter/event monitor) were reviewed and discussed.      Lab Review:   Results for orders placed or performed in visit on 06/01/17   PSA   Result Value Ref Range    PSA 5.6 (H) 0.0 - 4.0 ng/mL           Diagnosis:   1. Paroxysmal atrial fibrillation  2. Essential hypertension  3. Syncope, unspecified syncope type      Assessment & Plan:     1. PAF  - Doing well. Asymptomatic. Compliant with Flecainide and Eliquis. EKG reviewed today with acceptable QRS. 1st degree AV block with IN of 300 msec. No changes for now and no sig issues with current Rx.   2. Essential hypertension  - BP fairly well-controlled  - Continue current medications    3. Syncope and thought to be sec I think to dehydration type event or even some neurocardiogenic component. No palpitations noted  - Will await 30 day event monitor results to look for dom or tachy arrhythmias as a cause. Only thing he has felt since then was LH most from sitting to standing  - Keep hydrated and make sure to take his time getting up     4. Follow up in 4-5 mths. Will call patient once 30 day event monitor results are given to me to discuss further.          CC: ARMIDA Hay, acting as a scribe for Lucius Morales  02/02/18  1:04 PM      I, Lucius Morales DO, personally performed the services described in this documentation as scribed by the above named individual in my presence, and it is both accurate and complete.   2/2/2018  1:19 PM    Lucius Morales DO  1:19 PM  02/02/18          EMR Dragon/Transcription disclaimer:  Much of this encounter note is an electronic transcription/translation of spoken language to printed text. Electronic translation of spoken language may permit erroneous, or at times, nonsensical words or phrases to be inadvertently transcribed. Although I have reviewed the note for such errors, some may still exist.

## 2018-02-14 DIAGNOSIS — I48.0 PAROXYSMAL ATRIAL FIBRILLATION (HCC): ICD-10-CM

## 2018-02-15 ENCOUNTER — OUTSIDE FACILITY SERVICE (OUTPATIENT)
Dept: CARDIOLOGY | Facility: CLINIC | Age: 83
End: 2018-02-15

## 2018-02-15 PROCEDURE — 93272 ECG/REVIEW INTERPRET ONLY: CPT | Performed by: INTERNAL MEDICINE

## 2018-03-05 ENCOUNTER — TELEPHONE (OUTPATIENT)
Dept: CARDIOLOGY | Facility: CLINIC | Age: 83
End: 2018-03-05

## 2018-03-05 NOTE — TELEPHONE ENCOUNTER
Patient is aware of monitor results. Patient stated he had no chest pain or shortness of breath. Patient verbalized understanding. Patient is to keep his rescheduled follow up

## 2018-03-17 LAB — PSA SERPL-MCNC: 1.9 NG/ML (ref 0–4)

## 2018-03-19 DIAGNOSIS — I48.91 ATRIAL FIBRILLATION, UNSPECIFIED TYPE (HCC): Primary | ICD-10-CM

## 2018-03-19 RX ORDER — FLECAINIDE ACETATE 100 MG/1
TABLET ORAL
Qty: 180 TABLET | Refills: 3 | Status: SHIPPED | OUTPATIENT
Start: 2018-03-19 | End: 2019-02-21

## 2018-04-03 ENCOUNTER — OFFICE VISIT (OUTPATIENT)
Dept: UROLOGY | Facility: CLINIC | Age: 83
End: 2018-04-03

## 2018-04-03 VITALS — HEIGHT: 73 IN | WEIGHT: 217 LBS | BODY MASS INDEX: 28.76 KG/M2

## 2018-04-03 DIAGNOSIS — C61 PROSTATE CANCER (HCC): Primary | ICD-10-CM

## 2018-04-03 PROCEDURE — 99213 OFFICE O/P EST LOW 20 MIN: CPT | Performed by: UROLOGY

## 2018-04-03 PROCEDURE — 96402 CHEMO HORMON ANTINEOPL SQ/IM: CPT | Performed by: UROLOGY

## 2018-04-03 NOTE — PROGRESS NOTES
Chief Complaint:          Prostate cancer.    HPI:   82 y.o. male.    HPI  Pt had HiFu for prostate cancer and his psa increased from 4.2 to 5.6 on 6/2017 this was done on June.  He has had lupron prior to his HIFU.  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  With him being 82 years old he and I agreed that radiation therapy would likely cause decreased quality of life compared to hormonal therapy with lupron.  His psa is 1.9  Pt has been on lupron for 6 month.    Past Medical History:        Past Medical History:   Diagnosis Date   • Atrial fibrillation    • Coronary artery disease    • Hyperlipidemia    • Hypertension    • Prostate cancer          Current Meds:     Current Outpatient Prescriptions   Medication Sig Dispense Refill   • amLODIPine (NORVASC) 5 MG tablet take 1 tablet by mouth once daily 90 tablet 1   • apixaban (ELIQUIS) 5 MG tablet tablet Take 1 tablet by mouth Every 12 (Twelve) Hours. 60 tablet 5   • Bioflavonoid Products (SCOTT-C) 500-550 MG tablet Take 1 tablet by mouth Daily.     • Cholecalciferol (VITAMIN D3) 5000 UNITS capsule capsule Take 5,000 Units by mouth Daily.     • flecainide (TAMBOCOR) 100 MG tablet take 1 tablet by mouth twice a day 180 tablet 3   • folic acid (FOLVITE) 800 MCG tablet Take 800 mcg by mouth daily.     • levothyroxine (SYNTHROID, LEVOTHROID) 75 MCG tablet Daily.  0   • lisinopril (PRINIVIL,ZESTRIL) 5 MG tablet Take 5 mg by mouth Daily.     • niacin (NIACIN SR,NIASPAN ER) 500 MG CR capsule Take 500 mg by mouth daily.     • triamterene-hydrochlorothiazide (MAXZIDE-25) 37.5-25 MG per tablet Take 1 tablet by mouth daily.       No current facility-administered medications for this visit.         Allergies:      No Known Allergies     Past Surgical History:     Past Surgical History:   Procedure Laterality Date   • CARDIAC CATHETERIZATION     • HERNIA REPAIR           Social History:     Social History     Social History   •  "Marital status:      Spouse name: N/A   • Number of children: N/A   • Years of education: N/A     Occupational History   • Not on file.     Social History Main Topics   • Smoking status: Never Smoker   • Smokeless tobacco: Never Used   • Alcohol use No   • Drug use: No   • Sexual activity: Defer     Other Topics Concern   • Not on file     Social History Narrative   • No narrative on file       Family History:     Family History   Problem Relation Age of Onset   • Kidney disease Mother    • Sudden death Other    • Cancer Other        Review of Systems:     Review of Systems   Constitutional: Positive for fatigue. Negative for chills and fever.   HENT: Positive for sinus pressure. Negative for congestion.    Respiratory: Negative for shortness of breath.    Cardiovascular: Negative for chest pain.   Gastrointestinal: Positive for nausea and vomiting. Negative for abdominal pain, constipation and diarrhea.   Genitourinary: Positive for frequency and urgency. Negative for difficulty urinating.   Musculoskeletal: Negative for back pain and neck pain.   Skin: Positive for rash.   Neurological: Negative for dizziness and headaches.   Hematological: Does not bruise/bleed easily.   Psychiatric/Behavioral: The patient is not nervous/anxious.            The following portions of the patient's history were reviewed and updated as appropriate:allergies, current medications and ROS  Physical Exam:     Physical Exam    Ht 185.4 cm (72.99\")   Wt 98.4 kg (217 lb)   BMI 28.64 kg/m²    Procedure:         Assessment:     Encounter Diagnosis   Name Primary?   • Prostate cancer Yes     No orders of the defined types were placed in this encounter.      Plan:   Will give him a lupron and will see him in 6 months.    Discussed the patient's BMI with him. BMI is within normal parameters. No follow-up required.      Counseling was given to patient for the following topics diagnostic results including: psa. The interim medical " history and current results were reviewed.  A treatment plan with follow-up was made for Prostate cancer [C61]. I spent 23 minutes face to face with Martín Campa and 60 percentage was spent in counseling.    This document has been electronically signed by Brett Talavera MD April 3, 2018 12:21 PM

## 2018-05-03 ENCOUNTER — OFFICE VISIT (OUTPATIENT)
Dept: CARDIOLOGY | Facility: CLINIC | Age: 83
End: 2018-05-03

## 2018-05-03 VITALS
WEIGHT: 226.2 LBS | SYSTOLIC BLOOD PRESSURE: 117 MMHG | HEIGHT: 73 IN | DIASTOLIC BLOOD PRESSURE: 68 MMHG | OXYGEN SATURATION: 99 % | BODY MASS INDEX: 29.98 KG/M2 | HEART RATE: 60 BPM

## 2018-05-03 DIAGNOSIS — I48.0 PAROXYSMAL ATRIAL FIBRILLATION (HCC): Primary | ICD-10-CM

## 2018-05-03 DIAGNOSIS — R55 SYNCOPE, UNSPECIFIED SYNCOPE TYPE: ICD-10-CM

## 2018-05-03 DIAGNOSIS — I10 ESSENTIAL HYPERTENSION: ICD-10-CM

## 2018-05-03 PROCEDURE — 99213 OFFICE O/P EST LOW 20 MIN: CPT | Performed by: PHYSICIAN ASSISTANT

## 2018-05-03 NOTE — PROGRESS NOTES
Problem list     Subjective   Martín Campa is a 82 y.o. male     Chief Complaint   Patient presents with   • Follow-up     patient appears in office today for routine follow up    • Atrial Fibrillation   Problem List:  1. Atrial fibrillation with RVR during hospitalization, October 2014.  1.1 Status post cardioversion, December 2014.  1.2 Residual A flutter, despite increased dosing of Flecainide.  1.3 Repeat Cardioversion, 09/2016, with maintenance of SR with Flecainide.  2. 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 Hypertension  4. Dyslipidemia      5. Prostate cancer      6. First degree AVB    HPI    The patient presents back today for follow-up.  He was seen in the setting of syncope at last visit.  This was felt questionably related to dehydration.  He was scheduled for an event monitor.  His event monitor suggested episodes of A. Fib.  I have uploaded this so that his electrophysiology team may view that.  He is scheduled for follow-up with that service soon.  He has had no further syncopal episodes.  He denies chest pain.  He has stable dyspnea.  He denies PND or orthopnea.  He feels that he is doing well otherwise and has no further complaints.  Current Outpatient Prescriptions   Medication Sig Dispense Refill   • amLODIPine (NORVASC) 5 MG tablet take 1 tablet by mouth once daily 90 tablet 1   • apixaban (ELIQUIS) 5 MG tablet tablet Take 1 tablet by mouth Every 12 (Twelve) Hours. 60 tablet 5   • Bioflavonoid Products (SCOTT-C) 500-550 MG tablet Take 1 tablet by mouth Daily.     • Cholecalciferol (VITAMIN D3) 5000 UNITS capsule capsule Take 5,000 Units by mouth Daily.     • flecainide (TAMBOCOR) 100 MG tablet take 1 tablet by mouth twice a day 180 tablet 3   • folic acid (FOLVITE) 800 MCG tablet Take 800 mcg by mouth daily.     • levothyroxine (SYNTHROID, LEVOTHROID) 75 MCG tablet Take 75 mcg by mouth Daily.  0   • lisinopril (PRINIVIL,ZESTRIL) 5 MG tablet Take 5 mg by mouth Daily.     • niacin  (NIACIN SR,NIASPAN ER) 500 MG CR capsule Take 500 mg by mouth daily.     • triamterene-hydrochlorothiazide (MAXZIDE-25) 37.5-25 MG per tablet Take 1 tablet by mouth daily.       No current facility-administered medications for this visit.        Review of patient's allergies indicates no known allergies.    Past Medical History:   Diagnosis Date   • Atrial fibrillation    • Coronary artery disease    • Hyperlipidemia    • Hypertension    • Prostate cancer        Social History     Social History   • Marital status:      Spouse name: N/A   • Number of children: N/A   • Years of education: N/A     Occupational History   • Not on file.     Social History Main Topics   • Smoking status: Never Smoker   • Smokeless tobacco: Never Used   • Alcohol use No   • Drug use: No   • Sexual activity: Defer     Other Topics Concern   • Not on file     Social History Narrative   • No narrative on file       Family History   Problem Relation Age of Onset   • Kidney disease Mother    • Sudden death Other    • Cancer Other        Review of Systems   Constitutional: Negative.  Negative for fatigue.   HENT: Negative.  Negative for congestion, rhinorrhea, sneezing and sore throat.    Eyes: Positive for visual disturbance (wears reading glasses).   Respiratory: Positive for shortness of breath (easily SOA with exertion ). Negative for apnea, cough, chest tightness and wheezing.    Cardiovascular: Negative.  Negative for chest pain (denies CP), palpitations (denies palpitations) and leg swelling.   Gastrointestinal: Negative.  Negative for abdominal distention, abdominal pain, nausea and vomiting.   Endocrine: Negative.  Negative for cold intolerance, heat intolerance, polyphagia and polyuria.   Genitourinary: Negative.  Negative for difficulty urinating, frequency and urgency.   Musculoskeletal: Positive for arthralgias (joints). Negative for back pain, myalgias, neck pain and neck stiffness.   Skin: Positive for rash (rash  "throughout body]). Negative for wound.   Allergic/Immunologic: Negative.  Negative for environmental allergies and food allergies.   Neurological: Negative.  Negative for dizziness, weakness, light-headedness and headaches.   Hematological: Bruises/bleeds easily (bruises and bleeds easily).   Psychiatric/Behavioral: Negative.  Negative for agitation, confusion and sleep disturbance (denies waking up smothering/SOA). The patient is not nervous/anxious.        Objective   Vitals:    05/03/18 1109   BP: 117/68   BP Location: Left arm   Patient Position: Sitting   Pulse: 60   SpO2: 99%   Weight: 103 kg (226 lb 3.2 oz)   Height: 185.4 cm (73\")      /68 (BP Location: Left arm, Patient Position: Sitting)   Pulse 60   Ht 185.4 cm (73\")   Wt 103 kg (226 lb 3.2 oz)   SpO2 99%   BMI 29.84 kg/m²    Lab Results (most recent)     None        Physical Exam   Constitutional: He is oriented to person, place, and time. He appears well-developed and well-nourished. No distress.   HENT:   Head: Normocephalic and atraumatic.   Eyes: Conjunctivae and EOM are normal. Pupils are equal, round, and reactive to light.   Neck: Normal range of motion. Neck supple. No JVD present. No tracheal deviation present.   Cardiovascular: Normal rate, regular rhythm and intact distal pulses.    Murmur (Soft SHAD) heard.   Systolic murmur is present with a grade of 1/6   Pulmonary/Chest: Effort normal and breath sounds normal.   Abdominal: Soft. Bowel sounds are normal. He exhibits no distension and no mass. There is no tenderness. There is no rebound and no guarding.   Musculoskeletal: Normal range of motion. He exhibits no edema, tenderness or deformity.   Neurological: He is alert and oriented to person, place, and time.   Skin: Skin is warm and dry. No rash noted. No erythema. No pallor.   Psychiatric: He has a normal mood and affect. His behavior is normal. Judgment and thought content normal.   Nursing note and vitals " reviewed.        Procedure   Procedures       Assessment/Plan      Diagnosis Plan   1. Paroxysmal atrial fibrillation     2. Essential hypertension     3. Syncope, unspecified syncope type         The patient continues to do well from cardiovascular standpoint.  Again, he has had no further syncopal episodes.  He denies dizziness or presyncope otherwise.  His event monitor indicates results as outlined above.  I have a uploaded that so that it may be viewed through his EP provider.  I will make no changes for now.  He seems stable from general cardiac standpoint.  For any further complications, the patient will call.  Otherwise, we will see him back in 3 months to reevaluate clinical course.           Patient's Body mass index is 29.84 kg/m². BMI is above normal parameters. Follow-up plan includes:  educational material and referral to primary care.             Electronically signed by:

## 2018-05-03 NOTE — PATIENT INSTRUCTIONS

## 2018-05-24 DIAGNOSIS — I10 ESSENTIAL HYPERTENSION: Primary | ICD-10-CM

## 2018-05-24 RX ORDER — AMLODIPINE BESYLATE 5 MG/1
TABLET ORAL
Qty: 90 TABLET | Refills: 3 | Status: SHIPPED | OUTPATIENT
Start: 2018-05-24 | End: 2019-05-29 | Stop reason: SDUPTHER

## 2018-06-08 RX ORDER — FLECAINIDE ACETATE 100 MG/1
TABLET ORAL
Qty: 180 TABLET | Refills: 3 | Status: SHIPPED | OUTPATIENT
Start: 2018-06-08 | End: 2018-08-09

## 2018-08-03 ENCOUNTER — OFFICE VISIT (OUTPATIENT)
Dept: CARDIOLOGY | Facility: CLINIC | Age: 83
End: 2018-08-03

## 2018-08-03 VITALS
HEIGHT: 60 IN | BODY MASS INDEX: 43.19 KG/M2 | DIASTOLIC BLOOD PRESSURE: 64 MMHG | WEIGHT: 220 LBS | SYSTOLIC BLOOD PRESSURE: 134 MMHG | HEART RATE: 54 BPM

## 2018-08-03 DIAGNOSIS — I48.0 PAROXYSMAL ATRIAL FIBRILLATION (HCC): Primary | ICD-10-CM

## 2018-08-03 DIAGNOSIS — R00.1 BRADYCARDIA: ICD-10-CM

## 2018-08-03 PROCEDURE — 99213 OFFICE O/P EST LOW 20 MIN: CPT | Performed by: INTERNAL MEDICINE

## 2018-08-03 NOTE — PROGRESS NOTES
Subjective:   Martín Campa  1935  [phone]      02/02/2018    [facility] CARDIOLOGY    [place] MD  [address] [place], [place] [id number]      Patient ID: Martín Campa is a 83 y.o. male.    Chief Complaint:   No chief complaint on file.    Problem List:  1. Atrial fibrillation with RVR during hospitalization, October 2014.                    A. Status post cardioversion, December 2014. EF 60-65%                    B. Had been initially started on amiodarone but stopped about one year ago (was on for 10 months) due to thyroid issues, on synthroid                    C. Residual A flutter/Afib, despite increased dosing of Flecainide now on 100 mg BID. No recent cardioversions                    D. CHADS-VASC= 3, On Eliquis 5mg BID. Recently decreased to 2.5 mg BID due to nose bleeds                    E. On Eliquis 5 mg BID, Flec 100 mg BID ECV in Sept 2016. Maintaining SR and not feeling any real difference in Afib vs SR                    F. Episode of Afib noted on Event Monitor with symptoms of LH noted with slow HRs      2. 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 Hypertension  4. Dyslipidemia      5. Prostate cancer    No Known Allergies    Current Outpatient Prescriptions:   •  amLODIPine (NORVASC) 5 MG tablet, take 1 tablet by mouth once daily, Disp: 90 tablet, Rfl: 3  •  apixaban (ELIQUIS) 5 MG tablet tablet, Take 1 tablet by mouth Every 12 (Twelve) Hours., Disp: 60 tablet, Rfl: 5  •  Bioflavonoid Products (SCOTT-C) 500-550 MG tablet, Take 1 tablet by mouth Daily., Disp: , Rfl:   •  Cholecalciferol (VITAMIN D3) 5000 UNITS capsule capsule, Take 5,000 Units by mouth Daily., Disp: , Rfl:   •  flecainide (TAMBOCOR) 100 MG tablet, take 1 tablet by mouth twice a day, Disp: 180 tablet, Rfl: 3  •  folic acid (FOLVITE) 800 MCG tablet, Take 800 mcg by mouth daily., Disp: , Rfl:   •  levothyroxine (SYNTHROID, LEVOTHROID) 75 MCG tablet, Take 75 mcg by mouth  "Daily., Disp: , Rfl: 0  •  lisinopril (PRINIVIL,ZESTRIL) 5 MG tablet, Take 5 mg by mouth Daily., Disp: , Rfl:   •  niacin (NIACIN SR,NIASPAN ER) 500 MG CR capsule, Take 500 mg by mouth daily., Disp: , Rfl:   •  triamterene-hydrochlorothiazide (MAXZIDE-25) 37.5-25 MG per tablet, Take 1 tablet by mouth daily., Disp: , Rfl:       History of Present Illness:   Mr. Campa is an 82 year old white male with pmhx as noted above. He presents for follow regarding history of afib. Dealing with some fatigue and joint pain but overall doing ok. Cant do a lot of physical work due to fatigue and joint pain.     The following portions of the patient's history were reviewed and updated as appropriate: allergies, current medications, past family history, past medical history, past social history, past surgical history and problem list.    ROS   14 point ROS negative except as outlined in problem list, HPI and other parts of the note.    Procedures       Objective:       Vitals:    08/03/18 1237   BP: 134/64   BP Location: Left arm   Patient Position: Sitting   Weight: 99.8 kg (220 lb)   Height: 72 cm (28.35\")     Physical Exam:   GENERAL: Well-developed, well-nourished patient in no acute distress.  HEENT: Normocephalic, atraumatic, PERRLA. Moist mucous membranes.  NECK: No JVD present at 30°. No carotid bruits auscultated.  LUNGS: Clear to auscultation. No rales, rhonchi or wheezes.   CARDIOVASCULAR: Heart has a regular rate and rhythm. No murmurs, gallops or rubs noted.   ABDOMEN: Soft, nontender. Positive bowel sounds.  MUSCULOSKELETAL: No gross deformities. No clubbing, cyanosis, or lower extremity edema.  SKIN: Pink, warm  Neuro: Nonfocal exam. Gait intact  Ext: No edema or bruising    The patient's old records including ambulatory rhythm recordings (ECGs, Holter/event monitor) were reviewed and discussed.      Lab Review:   Results for orders placed or performed in visit on 03/16/18   PSA DIAGNOSTIC   Result Value Ref Range    " PSA 1.9 0.0 - 4.0 ng/mL     EKG: SR at times with junctional with PACs and short run of Atach. QRS at 82 msec.   Event Monitor : AFib episodes with at times lower HRs        Diagnosis:   1. Paroxysmal atrial fibrillation  2. Essential hypertension  3. Syncope, unspecified syncope type      Assessment & Plan:     1. 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   - Increasing fatigue and weakness noted at time. Compliant with Flecainide and Eliquis. EKG reviewed today with acceptable QRS.   - Due to patients increasing symptoms and lower HRs will continue to monitor closely per patients wishes and no PPM for now. He will watch his symptoms and HR more closely and follow up in 3 mths. At that time if symptoms worse and correlate with lower HRs then will proceed with a DDD PPM.   - Continue Eliquis    2. Essential hypertension  - BP fairly well-controlled  - Continue current medications    3. Follow up in 3 mths or sooner as needed       CC: Dr Nahid Morales, DO  12:41 PM  08/03/18          EMR Dragon/Transcription disclaimer:  Much of this encounter note is an electronic transcription/translation of spoken language to printed text. Electronic translation of spoken language may permit erroneous, or at times, nonsensical words or phrases to be inadvertently transcribed. Although I have reviewed the note for such errors, some may still exist.

## 2018-08-09 ENCOUNTER — OFFICE VISIT (OUTPATIENT)
Dept: CARDIOLOGY | Facility: CLINIC | Age: 83
End: 2018-08-09

## 2018-08-09 VITALS
DIASTOLIC BLOOD PRESSURE: 70 MMHG | WEIGHT: 227.2 LBS | BODY MASS INDEX: 30.11 KG/M2 | HEART RATE: 68 BPM | SYSTOLIC BLOOD PRESSURE: 124 MMHG | HEIGHT: 73 IN | OXYGEN SATURATION: 99 %

## 2018-08-09 DIAGNOSIS — R00.1 BRADYCARDIA: ICD-10-CM

## 2018-08-09 DIAGNOSIS — I10 ESSENTIAL HYPERTENSION: ICD-10-CM

## 2018-08-09 DIAGNOSIS — I48.0 PAROXYSMAL ATRIAL FIBRILLATION (HCC): Primary | ICD-10-CM

## 2018-08-09 DIAGNOSIS — R06.02 SHORTNESS OF BREATH: ICD-10-CM

## 2018-08-09 PROCEDURE — 99213 OFFICE O/P EST LOW 20 MIN: CPT | Performed by: PHYSICIAN ASSISTANT

## 2018-08-09 NOTE — PATIENT INSTRUCTIONS

## 2018-08-09 NOTE — PROGRESS NOTES
Problem list     Subjective   Martín Campa is a 83 y.o. male     Chief Complaint   Patient presents with   • Follow-up     patient appears in office today for 3 month follow up   • Atrial Fibrillation   Problem List:  1. Atrial fibrillation with RVR during hospitalization, October 2014.  1.1 Status post cardioversion, December 2014.  1.2 Residual A flutter, despite increased dosing of Flecainide.  1.3 Repeat Cardioversion, 09/2016, with maintenance of SR with Flecainide.  2. 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 Hypertension  4. Dyslipidemia      5. Prostate cancer      6. First degree AVB    HPI  The patient presents in today for routine follow-up.  He did see EP services recently.  Because of fairly significant, symptomatic bradycardia by recent event monitor, they did discuss pacemaker with the patient.  The patient felt well enough at that time that he did not want to pursue device implant.  He still is doing fairly well.  He has had no further episodes of syncope.  That apparently was felt to be dehydration.  He has no chest pain.  He reports stable dyspnea.  He denies PND or orthopnea.  Blood pressures are fairly well-controlled on current medical regimen.  He relates no bleeding complications with anticoagulation therapy.    Current Outpatient Prescriptions   Medication Sig Dispense Refill   • amLODIPine (NORVASC) 5 MG tablet take 1 tablet by mouth once daily 90 tablet 3   • apixaban (ELIQUIS) 5 MG tablet tablet Take 1 tablet by mouth Every 12 (Twelve) Hours. 60 tablet 5   • Bioflavonoid Products (SCOTT-C) 500-550 MG tablet Take 1 tablet by mouth Daily.     • Cholecalciferol (VITAMIN D3) 5000 UNITS capsule capsule Take 5,000 Units by mouth Daily.     • flecainide (TAMBOCOR) 100 MG tablet take 1 tablet by mouth twice a day 180 tablet 3   • folic acid (FOLVITE) 800 MCG tablet Take 800 mcg by mouth daily.     • levothyroxine (SYNTHROID, LEVOTHROID) 75 MCG tablet Take 75 mcg by mouth Daily.  0   •  lisinopril (PRINIVIL,ZESTRIL) 5 MG tablet Take 5 mg by mouth Daily.     • niacin (NIACIN SR,NIASPAN ER) 500 MG CR capsule Take 500 mg by mouth daily.     • triamterene-hydrochlorothiazide (MAXZIDE-25) 37.5-25 MG per tablet Take 1 tablet by mouth daily.       No current facility-administered medications for this visit.        Patient has no known allergies.    Past Medical History:   Diagnosis Date   • Atrial fibrillation (CMS/HCC)    • Coronary artery disease    • Hyperlipidemia    • Hypertension    • Prostate cancer (CMS/HCC)        Social History     Social History   • Marital status:      Spouse name: N/A   • Number of children: N/A   • Years of education: N/A     Occupational History   • Not on file.     Social History Main Topics   • Smoking status: Never Smoker   • Smokeless tobacco: Never Used   • Alcohol use No   • Drug use: No   • Sexual activity: Defer     Other Topics Concern   • Not on file     Social History Narrative   • No narrative on file       Family History   Problem Relation Age of Onset   • Kidney disease Mother    • Sudden death Other    • Cancer Other        Review of Systems   Constitutional: Positive for fatigue (easily fatigued).   HENT: Negative.  Negative for congestion, rhinorrhea, sneezing and sore throat.    Eyes: Positive for visual disturbance (wears reading glasses).   Respiratory: Positive for shortness of breath (SOA on exertion ). Negative for apnea, cough, chest tightness and wheezing.    Cardiovascular: Negative.  Negative for chest pain (denies CP), palpitations (denies palpitations) and leg swelling.   Gastrointestinal: Negative.  Negative for abdominal distention, abdominal pain, nausea and vomiting.   Endocrine: Negative.  Negative for cold intolerance, heat intolerance, polyphagia and polyuria.   Genitourinary: Negative.  Negative for difficulty urinating, frequency and urgency.   Musculoskeletal: Positive for arthralgias (joints). Negative for back pain, myalgias,  "neck pain and neck stiffness.   Skin: Negative.  Negative for rash and wound.   Allergic/Immunologic: Negative.  Negative for environmental allergies and food allergies.   Neurological: Negative.  Negative for dizziness, syncope, weakness, light-headedness and numbness.   Hematological: Negative.  Does not bruise/bleed easily.   Psychiatric/Behavioral: Negative.  Negative for agitation, confusion and sleep disturbance (denies waking up smothering/SOA). The patient is not nervous/anxious.        Objective   Vitals:    08/09/18 1324   BP: 124/70   BP Location: Left arm   Patient Position: Sitting   Pulse: 68   SpO2: 99%   Weight: 103 kg (227 lb 3.2 oz)   Height: 185.4 cm (73\")      /70 (BP Location: Left arm, Patient Position: Sitting)   Pulse 68   Ht 185.4 cm (73\")   Wt 103 kg (227 lb 3.2 oz)   SpO2 99%   BMI 29.98 kg/m²    Lab Results (most recent)     None        Physical Exam   Constitutional: He is oriented to person, place, and time. He appears well-developed and well-nourished. No distress.   HENT:   Head: Normocephalic and atraumatic.   Eyes: Pupils are equal, round, and reactive to light. Conjunctivae and EOM are normal.   Neck: Normal range of motion. Neck supple. No JVD present. No tracheal deviation present.   Cardiovascular: Normal rate, regular rhythm and intact distal pulses.    Murmur (Soft SHAD) heard.   Systolic murmur is present with a grade of 1/6   Pulmonary/Chest: Effort normal and breath sounds normal.   Abdominal: Soft. Bowel sounds are normal. He exhibits no distension and no mass. There is no tenderness. There is no rebound and no guarding.   Musculoskeletal: Normal range of motion. He exhibits no edema, tenderness or deformity.   Neurological: He is alert and oriented to person, place, and time.   Skin: Skin is warm and dry. No rash noted. No erythema. No pallor.   Psychiatric: He has a normal mood and affect. His behavior is normal. Judgment and thought content normal.   Nursing " note and vitals reviewed.        Procedure   Procedures       Assessment/Plan      Diagnosis Plan   1. Paroxysmal atrial fibrillation (CMS/HCC)     2. Essential hypertension     3. Bradycardia     4. Shortness of breath         The patient is stable at this time.  He relates to no continued symptoms of symptomatic bradycardia.  I will continue medications.  The patient will call for any issues.  We will see the patient back in 6 months, sooner if indicated by course.         Patient's Body mass index is 29.98 kg/m². BMI is above normal parameters. Recommendations include: educational material and referral to primary care.             Electronically signed by:

## 2018-09-14 DIAGNOSIS — I48.0 PAROXYSMAL ATRIAL FIBRILLATION (HCC): ICD-10-CM

## 2018-09-14 RX ORDER — APIXABAN 5 MG/1
TABLET, FILM COATED ORAL
Qty: 60 TABLET | Refills: 5 | Status: SHIPPED | OUTPATIENT
Start: 2018-09-14 | End: 2018-11-02 | Stop reason: SDUPTHER

## 2018-09-17 DIAGNOSIS — C61 PROSTATE CANCER (HCC): Primary | ICD-10-CM

## 2018-09-21 LAB — PSA SERPL-MCNC: 2.9 NG/ML (ref 0–4)

## 2018-09-22 ENCOUNTER — RESULTS ENCOUNTER (OUTPATIENT)
Dept: UROLOGY | Facility: CLINIC | Age: 83
End: 2018-09-22

## 2018-09-22 DIAGNOSIS — C61 PROSTATE CANCER (HCC): ICD-10-CM

## 2018-10-02 ENCOUNTER — OFFICE VISIT (OUTPATIENT)
Dept: UROLOGY | Facility: CLINIC | Age: 83
End: 2018-10-02

## 2018-10-02 VITALS — BODY MASS INDEX: 31.53 KG/M2 | HEIGHT: 71 IN | WEIGHT: 225.2 LBS

## 2018-10-02 DIAGNOSIS — C61 PROSTATE CANCER (HCC): Primary | ICD-10-CM

## 2018-10-02 PROCEDURE — 99213 OFFICE O/P EST LOW 20 MIN: CPT | Performed by: UROLOGY

## 2018-10-02 RX ORDER — AMOXICILLIN 500 MG/1
500 CAPSULE ORAL 2 TIMES DAILY
COMMUNITY
End: 2018-11-02

## 2018-10-02 RX ORDER — MONTELUKAST SODIUM 10 MG/1
10 TABLET ORAL NIGHTLY
COMMUNITY
End: 2019-02-21

## 2018-10-02 NOTE — PROGRESS NOTES
Chief Complaint:          Prostate cancer.    HPI:   83 y.o. male.    HPI  Pt had HiFu for prostate cancer and his psa increased from 4.2 to 5.6 on 6/2017 this was done on June.  He has had lupron prior to his HIFU.  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  With him being 82 years old he and I agreed that radiation therapy would likely cause decreased quality of life compared to hormonal therapy with lupron.  He has been on lupron since 2010.  Pt is on intermittent hormonal therapy.  His psa is 2.9.  His psa  In 1.9 a year ago and his psa went up to 5 in April of this year.    Past Medical History:        Past Medical History:   Diagnosis Date   • Atrial fibrillation (CMS/HCC)    • Coronary artery disease    • Hyperlipidemia    • Hypertension    • Prostate cancer (CMS/HCC)          Current Meds:     Current Outpatient Prescriptions   Medication Sig Dispense Refill   • amLODIPine (NORVASC) 5 MG tablet take 1 tablet by mouth once daily 90 tablet 3   • amoxicillin (AMOXIL) 500 MG capsule Take 500 mg by mouth 2 (Two) Times a Day.     • Bioflavonoid Products (SCOTT-C) 500-550 MG tablet Take 1 tablet by mouth Daily.     • Cholecalciferol (VITAMIN D3) 5000 UNITS capsule capsule Take 5,000 Units by mouth Daily.     • ELIQUIS 5 MG tablet tablet take 1 tablet by mouth every 12 hours 60 tablet 5   • flecainide (TAMBOCOR) 100 MG tablet take 1 tablet by mouth twice a day 180 tablet 3   • folic acid (FOLVITE) 800 MCG tablet Take 800 mcg by mouth daily.     • levothyroxine (SYNTHROID, LEVOTHROID) 75 MCG tablet Take 75 mcg by mouth Daily.  0   • lisinopril (PRINIVIL,ZESTRIL) 5 MG tablet Take 5 mg by mouth Daily.     • montelukast (SINGULAIR) 10 MG tablet Take 10 mg by mouth Every Night.     • niacin (NIACIN SR,NIASPAN ER) 500 MG CR capsule Take 500 mg by mouth daily.     • triamterene-hydrochlorothiazide (MAXZIDE-25) 37.5-25 MG per tablet Take 1 tablet by mouth daily.       No  current facility-administered medications for this visit.         Allergies:      No Known Allergies     Past Surgical History:     Past Surgical History:   Procedure Laterality Date   • CARDIAC CATHETERIZATION     • HERNIA REPAIR           Social History:     Social History     Social History   • Marital status:      Spouse name: N/A   • Number of children: N/A   • Years of education: N/A     Occupational History   • Not on file.     Social History Main Topics   • Smoking status: Never Smoker   • Smokeless tobacco: Never Used   • Alcohol use No   • Drug use: No   • Sexual activity: Defer     Other Topics Concern   • Not on file     Social History Narrative   • No narrative on file       Family History:     Family History   Problem Relation Age of Onset   • Kidney disease Mother    • Sudden death Other    • Cancer Other        Review of Systems:     Review of Systems   Constitutional: Negative for fatigue.   HENT: Positive for sinus pressure. Negative for sinus pain.    Eyes: Negative for pain.   Respiratory: Negative for chest tightness.    Cardiovascular: Negative for chest pain.   Gastrointestinal: Negative for abdominal pain, constipation and diarrhea.   Endocrine: Negative for heat intolerance.   Genitourinary: Negative for frequency.   Musculoskeletal: Positive for back pain.   Skin: Negative for rash.   Allergic/Immunologic: Negative for food allergies.   Neurological: Negative for headaches.   Hematological: Bruises/bleeds easily.   Psychiatric/Behavioral: The patient is not nervous/anxious.        I have reviewed the follow portions of the patient's history and confirmed they are accurate today:  allergies, current medications, past family history, past medical history, past social history, past surgical history, problem list and ROS  Physical Exam:     Physical Exam   Constitutional: He is oriented to person, place, and time.   HENT:   Head: Normocephalic and atraumatic.   Right Ear: External ear  "normal.   Left Ear: External ear normal.   Nose: Nose normal.   Mouth/Throat: Oropharynx is clear and moist.   Eyes: Pupils are equal, round, and reactive to light. Conjunctivae and EOM are normal.   Neck: Normal range of motion. Neck supple. No thyromegaly present.   Cardiovascular: Normal rate, regular rhythm, normal heart sounds and intact distal pulses.    No murmur heard.  Pulmonary/Chest: Effort normal and breath sounds normal. No respiratory distress. He has no wheezes. He has no rales. He exhibits no tenderness.   Abdominal: Soft. Bowel sounds are normal. He exhibits no distension and no mass. There is no tenderness. No hernia.   Genitourinary: Rectum normal, prostate normal and penis normal.   Genitourinary Comments: Prostate cancer flat without nodules   Musculoskeletal: Normal range of motion. He exhibits no edema or tenderness.   Lymphadenopathy:     He has no cervical adenopathy.   Neurological: He is alert and oriented to person, place, and time. No cranial nerve deficit. He exhibits normal muscle tone. Coordination normal.   Skin: Skin is warm. No rash noted.   Psychiatric: He has a normal mood and affect. His behavior is normal. Judgment and thought content normal.   Nursing note and vitals reviewed.      Ht 180.3 cm (71\")   Wt 102 kg (225 lb 3.2 oz)   BMI 31.41 kg/m²    Procedure:         Assessment:     Encounter Diagnosis   Name Primary?   • Prostate cancer (CMS/HCC) Yes     No orders of the defined types were placed in this encounter.      Plan:   Will see pt back in 6 months with a psa.  I discussed the new medications and intermittent therapy.    Patient's Body mass index is 31.41 kg/m². BMI is above normal parameters. Recommendations include: educational material.      Counseling was given to patient for the following topics instructions for management as follows: prostate cancer. The interim medical history and current results were reviewed.  A treatment plan with follow-up was made for " Prostate cancer (CMS/HCC) [C61].    This document has been electronically signed by Brett Talavera MD October 2, 2018 10:47 AM

## 2018-10-22 ENCOUNTER — TELEPHONE (OUTPATIENT)
Dept: CARDIOLOGY | Facility: CLINIC | Age: 83
End: 2018-10-22

## 2018-10-22 NOTE — TELEPHONE ENCOUNTER
PATIENT STATES IN THE DONUT HOLE NOW AND ELIQUIS IS GOING TO COST HIM OVER 140.00 / MO. TOLD HIM TO COME BY THE OFFICE AND I WOULD PUT SAMPLES BACK FOR HIM AND WE WOULD TRY TO HELP HIM WITH THEM. VERBALIZED OK. ALICIA CORONEL        ----- Message from Gloria Pittman sent at 10/22/2018 12:41 PM EDT -----  PT CALLED AND REQUESTED TO SPEAK TO CARRILLO. [phone], REGARDING HIS PRESCRIPTION FOR ELIQUIS.

## 2018-10-22 NOTE — TELEPHONE ENCOUNTER
PATIENT STATES IN THE DONUT HOLE NOW AND ELIQUIS IS GOING TO COST HIM OVER 140.00 / MO. TOLD HIM TO COME BY THE OFFICE AND I WOULD PUT SAMPLES BACK FOR HIM AND WE WOULD TRY TO HELP HIM WITH THEM. VERBALIZED OK. ALICIA CORONEL          ----- Message from Flory Reagan MA sent at 10/22/2018 12:46 PM EDT -----      ----- Message -----  From: Gloria Pittman  Sent: 10/22/2018  12:41 PM  To: [facility]    PT CALLED AND REQUESTED TO SPEAK TO CARRILLO. [phone], REGARDING HIS PRESCRIPTION FOR ELIQUIS.

## 2018-11-02 ENCOUNTER — OFFICE VISIT (OUTPATIENT)
Dept: CARDIOLOGY | Facility: CLINIC | Age: 83
End: 2018-11-02

## 2018-11-02 VITALS — SYSTOLIC BLOOD PRESSURE: 141 MMHG | DIASTOLIC BLOOD PRESSURE: 76 MMHG | HEART RATE: 86 BPM

## 2018-11-02 DIAGNOSIS — I10 ESSENTIAL HYPERTENSION: ICD-10-CM

## 2018-11-02 DIAGNOSIS — I48.0 PAROXYSMAL ATRIAL FIBRILLATION (HCC): ICD-10-CM

## 2018-11-02 DIAGNOSIS — I48.19 PERSISTENT ATRIAL FIBRILLATION (HCC): Primary | ICD-10-CM

## 2018-11-02 PROCEDURE — 99213 OFFICE O/P EST LOW 20 MIN: CPT | Performed by: INTERNAL MEDICINE

## 2018-11-02 NOTE — PROGRESS NOTES
Subjective:   Martín Campa  1935  [phone]      02/02/2018    [facility] CARDIOLOGY    [place] MD  [address] [place], [place] [address]      Patient ID: Martín Campa is a 83 y.o. male.    Chief Complaint:   Chief Complaint   Patient presents with   • Atrial Fibrillation     Problem List:  1. Atrial fibrillation with RVR during hospitalization, October 2014.                    A. Status post cardioversion, December 2014. EF 60-65%                    B. Had been initially started on amiodarone but stopped about one year ago (was on for 10 months) due to thyroid issues, on synthroid                    C. Residual A flutter/Afib, despite increased dosing of Flecainide now on 100 mg BID. No recent cardioversions                    D. CHADS-VASC= 3, On Eliquis 5mg BID. Recently decreased to 2.5 mg BID due to nose bleeds                    E. On Eliquis 5 mg BID, Flec 100 mg BID ECV in Sept 2016. Maintaining SR and not feeling any real difference in Afib vs SR                    F. Episode of Afib noted on Event Monitor with symptoms of LH noted with slow HRs      2. 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 Hypertension  4. Dyslipidemia      5. Prostate cancer    No Known Allergies    Current Outpatient Prescriptions:   •  amLODIPine (NORVASC) 5 MG tablet, take 1 tablet by mouth once daily, Disp: 90 tablet, Rfl: 3  •  apixaban (ELIQUIS) 5 MG tablet tablet, Take 1 tablet by mouth Every 12 (Twelve) Hours., Disp: 60 tablet, Rfl: 5  •  Bioflavonoid Products (SCOTT-C) 500-550 MG tablet, Take 1 tablet by mouth Daily., Disp: , Rfl:   •  Cholecalciferol (VITAMIN D3) 5000 UNITS capsule capsule, Take 5,000 Units by mouth Daily., Disp: , Rfl:   •  flecainide (TAMBOCOR) 100 MG tablet, take 1 tablet by mouth twice a day, Disp: 180 tablet, Rfl: 3  •  folic acid (FOLVITE) 800 MCG tablet, Take 800 mcg by mouth daily., Disp: , Rfl:   •  levothyroxine (SYNTHROID, LEVOTHROID) 75  MCG tablet, Take 75 mcg by mouth Daily., Disp: , Rfl: 0  •  lisinopril (PRINIVIL,ZESTRIL) 5 MG tablet, Take 5 mg by mouth Daily., Disp: , Rfl:   •  niacin (NIACIN SR,NIASPAN ER) 500 MG CR capsule, Take 500 mg by mouth daily., Disp: , Rfl:   •  triamterene-hydrochlorothiazide (MAXZIDE-25) 37.5-25 MG per tablet, Take 1 tablet by mouth daily., Disp: , Rfl:       History of Present Illness:   Mr. Campa is an 82 year old white male with pmhx as noted above. He presents for follow regarding history of afib. Dealing with some fatigue and joint pain but overall doing ok. Cant do a lot of physical work due to fatigue and joint pain. No major changes noted but HRs are better now. No chest pain, SOB, LH or syncope noted.     The following portions of the patient's history were reviewed and updated as appropriate: allergies, current medications, past family history, past medical history, past social history, past surgical history and problem list.    ROS   14 point ROS negative except as outlined in problem list, HPI and other parts of the note.    Procedures       Objective:       Vitals:    11/02/18 1004   BP: 141/76   BP Location: Left arm   Patient Position: Sitting   Pulse: 86     Physical Exam:   GENERAL: Well-developed, well-nourished patient in no acute distress.  HEENT: Normocephalic, atraumatic, PERRLA. Moist mucous membranes.  NECK: No JVD present at 30°. No carotid bruits auscultated.  LUNGS: Clear to auscultation. No rales, rhonchi or wheezes.   CARDIOVASCULAR: Heart has a regular rate and rhythm. No murmurs, gallops or rubs noted. Ectopy noted  ABDOMEN: Soft, nontender. Positive bowel sounds.  MUSCULOSKELETAL: No gross deformities. No clubbing, cyanosis, or lower extremity edema.  SKIN: Pink, warm  Neuro: Nonfocal exam. Gait intact  Ext: No edema or bruising    The patient's old records including ambulatory rhythm recordings (ECGs, Holter/event monitor) were reviewed and discussed.      Lab Review:   Results for  orders placed or performed in visit on 09/20/18   PSA DIAGNOSTIC   Result Value Ref Range    PSA 2.9 0.0 - 4.0 ng/mL         Diagnosis:   1. Paroxysmal atrial fibrillation  2. Essential hypertension  3. Syncope, unspecified syncope type      Assessment & Plan:     1. 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   - Fatigue is stable. Compliant with Flecainide and Eliquis. EKG reviewed today with acceptable QRS and seems to be in NSR with PACs and Yinka noted.   - Hold off on pacer at this time unless HRs become an issue. But for now monitor. HRs 88 bpm.   - Continue Eliquis    2. Essential hypertension  - BP fairly well-controlled  - Continue current medications    3. Follow up in 5 mths or sooner as needed.        CC: Dr Nahid Morales,   10:38 AM  11/02/18

## 2018-11-29 ENCOUNTER — TELEPHONE (OUTPATIENT)
Dept: CARDIOLOGY | Facility: CLINIC | Age: 83
End: 2018-11-29

## 2018-11-29 NOTE — TELEPHONE ENCOUNTER
PATIENT'S WIFE  AWARE ELIQUIS SAMPLES READY FOR PICK-UP. HOMER,ALICIA            ----- Message from Houston Downs sent at 11/29/2018 10:49 AM EST -----  PT SAID YOU WOULD SUPPLY HIM WITH SAMPLES OF apixaban (ELIQUIS) 5 MG tablet tablet. PT HAS 5 DAYS LEFT

## 2018-12-03 ENCOUNTER — TELEPHONE (OUTPATIENT)
Dept: CARDIOLOGY | Facility: CLINIC | Age: 83
End: 2018-12-03

## 2018-12-03 NOTE — TELEPHONE ENCOUNTER
----- Message from Flory Reagan MA sent at 12/3/2018  2:33 PM EST -----  Patient came by this morning stating that he had heard on TV that there may be a recall on amlodipine. He come by the office this morning to make sure that he still needs to be taking it. Call back for the patient is [phone].

## 2018-12-03 NOTE — TELEPHONE ENCOUNTER
Called patient and notified him that he would need to call pharmacy and see if they used [facility] that medication was recalled from. Patient verbalized understanding and had no further questions at this time. -HATTIE;SHON

## 2019-01-07 ENCOUNTER — OFFICE VISIT (OUTPATIENT)
Dept: CARDIOLOGY | Facility: CLINIC | Age: 84
End: 2019-01-07

## 2019-01-07 VITALS
HEART RATE: 68 BPM | HEIGHT: 71 IN | WEIGHT: 228.2 LBS | DIASTOLIC BLOOD PRESSURE: 71 MMHG | OXYGEN SATURATION: 99 % | SYSTOLIC BLOOD PRESSURE: 124 MMHG | BODY MASS INDEX: 31.95 KG/M2

## 2019-01-07 DIAGNOSIS — I10 ESSENTIAL HYPERTENSION: ICD-10-CM

## 2019-01-07 DIAGNOSIS — I48.0 PAROXYSMAL ATRIAL FIBRILLATION (HCC): ICD-10-CM

## 2019-01-07 DIAGNOSIS — R55 SYNCOPE, UNSPECIFIED SYNCOPE TYPE: Primary | ICD-10-CM

## 2019-01-07 DIAGNOSIS — R00.1 BRADYCARDIA: ICD-10-CM

## 2019-01-07 PROCEDURE — 99213 OFFICE O/P EST LOW 20 MIN: CPT | Performed by: PHYSICIAN ASSISTANT

## 2019-01-07 NOTE — PROGRESS NOTES
Problem list     Subjective   Martín Campa is a 83 y.o. male     Chief Complaint   Patient presents with   • Follow-up     patient appears in office today for hospital follow up    • Loss of Consciousness   Problem List:  1. Atrial fibrillation with RVR during hospitalization, October 2014.  1.1 Status post cardioversion, December 2014.  1.2 Residual A flutter, despite increased dosing of Flecainide.  1.3 Repeat Cardioversion, 09/2016, with maintenance of SR with Flecainide.  2. 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 Hypertension  4. Dyslipidemia      5. Prostate cancer      6. First degree AVB    HPI  The patient presents in today for hospital follow-up.  He was hospitalized following a syncopal episode.  This would represent the patient's second syncopal episode in recent times.  Previous workup for that as above is been unremarkable.  He has been found to have atrial fibrillation.  He is also been found to have bradycardia in the past.  His previous monitor suggested possible junctional rhythm.  He had been discussed through EP services for consideration of pacemaker.  That eventually was not felt warranted.  It was felt during his hospitalization by Dr. Hopkins that it was likely his flecainide that cause significant bradycardia and subsequent syncope.  That was subsequently decreased.  We reviewed mechanisms of flecainide in that regard, but advised for now that we would likely discontinue the flecainide dosing.  The patient has had ongoing dizziness at times.  He reports only stable dyspnea.  He has no chest pain.  He has no failure symptoms.  He has no further complaints otherwise.    Current Outpatient Medications   Medication Sig Dispense Refill   • amLODIPine (NORVASC) 5 MG tablet take 1 tablet by mouth once daily 90 tablet 3   • apixaban (ELIQUIS) 5 MG tablet tablet Take 1 tablet by mouth Every 12 (Twelve) Hours. 60 tablet 6   • Bioflavonoid Products (SCOTT-C) 500-550 MG tablet Take 1 tablet by  mouth Daily.     • Cholecalciferol (VITAMIN D3) 5000 UNITS capsule capsule Take 5,000 Units by mouth Daily.     • flecainide (TAMBOCOR) 100 MG tablet take 1 tablet by mouth twice a day 180 tablet 3   • folic acid (FOLVITE) 800 MCG tablet Take 800 mcg by mouth daily.     • levothyroxine (SYNTHROID, LEVOTHROID) 75 MCG tablet Take 75 mcg by mouth Daily.  0   • lisinopril (PRINIVIL,ZESTRIL) 5 MG tablet Take 5 mg by mouth Daily.     • montelukast (SINGULAIR) 10 MG tablet Take 10 mg by mouth Every Night.     • niacin (NIACIN SR,NIASPAN ER) 500 MG CR capsule Take 500 mg by mouth daily.     • triamterene-hydrochlorothiazide (MAXZIDE-25) 37.5-25 MG per tablet Take 1 tablet by mouth daily.       No current facility-administered medications for this visit.        Patient has no known allergies.    Past Medical History:   Diagnosis Date   • Atrial fibrillation (CMS/HCC)    • Coronary artery disease    • Hyperlipidemia    • Hypertension    • Prostate cancer (CMS/HCC)        Social History     Socioeconomic History   • Marital status:      Spouse name: Not on file   • Number of children: Not on file   • Years of education: Not on file   • Highest education level: Not on file   Social Needs   • Financial resource strain: Not on file   • Food insecurity - worry: Not on file   • Food insecurity - inability: Not on file   • Transportation needs - medical: Not on file   • Transportation needs - non-medical: Not on file   Occupational History   • Not on file   Tobacco Use   • Smoking status: Never Smoker   • Smokeless tobacco: Never Used   Substance and Sexual Activity   • Alcohol use: No   • Drug use: No   • Sexual activity: Defer   Other Topics Concern   • Not on file   Social History Narrative   • Not on file       Family History   Problem Relation Age of Onset   • Kidney disease Mother    • Sudden death Other    • Cancer Other        Review of Systems   Constitutional: Positive for fatigue (easily fatigued).   HENT:  "Negative.  Negative for congestion, rhinorrhea, sneezing and sore throat.    Eyes: Positive for visual disturbance (wears glasses daily).   Respiratory: Positive for cough (cough from allergies and drainage) and shortness of breath (easily SOA on exertion ). Negative for apnea, chest tightness and wheezing.    Cardiovascular: Positive for leg swelling (occasional BLE swelling/edema on exertion ). Negative for chest pain (denies CP) and palpitations (denies palpitations).   Gastrointestinal: Negative.  Negative for abdominal distention, abdominal pain, nausea and vomiting.   Endocrine: Negative.  Negative for cold intolerance and heat intolerance.   Genitourinary: Negative.  Negative for difficulty urinating, frequency and urgency.   Musculoskeletal: Positive for arthralgias (joints). Negative for back pain, myalgias, neck pain and neck stiffness.   Skin: Negative.  Negative for rash and wound.   Allergic/Immunologic: Negative.  Negative for environmental allergies and food allergies.   Neurological: Positive for syncope (syncopal episode x1 went to [facility]). Negative for dizziness, weakness, light-headedness and headaches.   Hematological: Bruises/bleeds easily (bruises and bleeds easily).   Psychiatric/Behavioral: Negative.  Negative for agitation, confusion and sleep disturbance (denies waking up smothering/SOA). The patient is not nervous/anxious.        Objective   Vitals:    01/07/19 1137   BP: 124/71   BP Location: Left arm   Patient Position: Sitting   Pulse: 68   SpO2: 99%   Weight: 104 kg (228 lb 3.2 oz)   Height: 180.3 cm (71\")      /71 (BP Location: Left arm, Patient Position: Sitting)   Pulse 68   Ht 180.3 cm (71\")   Wt 104 kg (228 lb 3.2 oz)   SpO2 99%   BMI 31.83 kg/m²    Lab Results (most recent)     None        Physical Exam   Constitutional: He is oriented to person, place, and time. He appears well-developed and well-nourished. No distress.   HENT:   Head: Normocephalic and atraumatic. "   Eyes: Conjunctivae and EOM are normal. Pupils are equal, round, and reactive to light.   Neck: Normal range of motion. Neck supple. No JVD present. No tracheal deviation present.   Cardiovascular: Normal rate, regular rhythm and intact distal pulses.   Murmur (Soft SHAD) heard.   Systolic murmur is present with a grade of 1/6.  Pulmonary/Chest: Effort normal and breath sounds normal.   Abdominal: Soft. Bowel sounds are normal. He exhibits no distension and no mass. There is no tenderness. There is no rebound and no guarding.   Musculoskeletal: Normal range of motion. He exhibits no edema, tenderness or deformity.   Neurological: He is alert and oriented to person, place, and time.   Skin: Skin is warm and dry. No rash noted. No erythema. No pallor.   Psychiatric: He has a normal mood and affect. His behavior is normal. Judgment and thought content normal.   Nursing note and vitals reviewed.        Procedure   Procedures       Assessment/Plan      Diagnosis Plan   1. Syncope, unspecified syncope type  Loop Recorder Implant - Treatment Room   2. Bradycardia  Loop Recorder Implant - Treatment Room   3. Essential hypertension     4. 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  We will schedule for implantable loop recorder so that we may follow rate and rhythm long-term.  His other workup is been unremarkable so I feel nothing further is indicated.  I will continue his current dosing of flecainide for now.  We will discuss and forward findings otherwise to his EP services well.  We will follow him closely after loop implant.         Patient's Body mass index is 31.83 kg/m². BMI is above normal parameters. Recommendations include: educational material and referral to primary care.             Electronically  signed by:

## 2019-01-07 NOTE — PATIENT INSTRUCTIONS

## 2019-02-15 ENCOUNTER — OUTSIDE FACILITY SERVICE (OUTPATIENT)
Dept: CARDIOLOGY | Facility: CLINIC | Age: 84
End: 2019-02-15

## 2019-02-15 DIAGNOSIS — R55 SYNCOPE, UNSPECIFIED SYNCOPE TYPE: ICD-10-CM

## 2019-02-15 DIAGNOSIS — R00.1 BRADYCARDIA: ICD-10-CM

## 2019-02-15 PROCEDURE — 33285 INSJ SUBQ CAR RHYTHM MNTR: CPT | Performed by: INTERNAL MEDICINE

## 2019-02-21 ENCOUNTER — CLINICAL SUPPORT (OUTPATIENT)
Dept: CARDIOLOGY | Facility: CLINIC | Age: 84
End: 2019-02-21

## 2019-02-21 VITALS
SYSTOLIC BLOOD PRESSURE: 112 MMHG | HEART RATE: 94 BPM | DIASTOLIC BLOOD PRESSURE: 60 MMHG | BODY MASS INDEX: 32.56 KG/M2 | OXYGEN SATURATION: 99 % | HEIGHT: 71 IN | WEIGHT: 232.6 LBS

## 2019-02-21 DIAGNOSIS — Z51.89 VISIT FOR WOUND CHECK: Primary | ICD-10-CM

## 2019-02-21 RX ORDER — LANOLIN ALCOHOL/MO/W.PET/CERES
500 CREAM (GRAM) TOPICAL NIGHTLY
COMMUNITY
End: 2021-10-15

## 2019-02-21 RX ORDER — FLECAINIDE ACETATE 50 MG/1
25 TABLET ORAL 2 TIMES DAILY
COMMUNITY
End: 2019-09-27 | Stop reason: SDUPTHER

## 2019-02-21 NOTE — PROGRESS NOTES
Martín Campa  1935 2/21/2019   ?   Chief Complaint   Patient presents with   • Wound Check     Pt presents in office today for NV. NV with incision cite.       ?   HPI:   ?   Pt presents to office today, nurse visit with incision site checked.  Loop implant placed on 2/15/19 by Dr. Rosario.  KH;CMA   ?     Current Outpatient Medications:   •  amLODIPine (NORVASC) 5 MG tablet, take 1 tablet by mouth once daily, Disp: 90 tablet, Rfl: 3  •  apixaban (ELIQUIS) 5 MG tablet tablet, Take 1 tablet by mouth Every 12 (Twelve) Hours., Disp: 60 tablet, Rfl: 6  •  Bioflavonoid Products (SCOTT-C) 500-550 MG tablet, Take 1 tablet by mouth Daily., Disp: , Rfl:   •  Cholecalciferol (VITAMIN D3) 5000 UNITS capsule capsule, Take 5,000 Units by mouth Daily., Disp: , Rfl:   •  folic acid (FOLVITE) 800 MCG tablet, Take 800 mcg by mouth daily., Disp: , Rfl:   •  levothyroxine (SYNTHROID, LEVOTHROID) 75 MCG tablet, Take 75 mcg by mouth Daily., Disp: , Rfl: 0  •  lisinopril (PRINIVIL,ZESTRIL) 5 MG tablet, Take 5 mg by mouth Daily., Disp: , Rfl:   •  niacin (NIACIN SR,NIASPAN ER) 500 MG CR capsule, Take 500 mg by mouth Every Night., Disp: , Rfl:   •  triamterene-hydrochlorothiazide (MAXZIDE-25) 37.5-25 MG per tablet, Take 1 tablet by mouth daily., Disp: , Rfl:   •  flecainide (TAMBOCOR) 100 MG tablet, take 1 tablet by mouth twice a day, Disp: 180 tablet, Rfl: 3   ?   ?   Patient has no known allergies.       Procedures     ?   Assessment/Plan    ?   1.  Wound Check    Pt presents to office today.  Bandage was removed from incision site today, area appears to be healing well no drainage, no redness, no warmth felt at this time.  Provider Sha Richards PA-C notified as well, provider gave verbal order for Pt to keep appointment scheduled with EP on 4/26/19.  Pt was scheduled prior to leaving office for interrogation on 3/1/19 as well as routine f/u with provider.  Pt notified if any problem with incision site area to contact office.   Pt verbalized understanding and had no further questions at this time.  KH:CMA?   ?

## 2019-02-21 NOTE — PATIENT INSTRUCTIONS

## 2019-02-26 ENCOUNTER — TELEPHONE (OUTPATIENT)
Dept: UROLOGY | Facility: CLINIC | Age: 84
End: 2019-02-26

## 2019-03-01 ENCOUNTER — OFFICE VISIT (OUTPATIENT)
Dept: CARDIOLOGY | Facility: CLINIC | Age: 84
End: 2019-03-01

## 2019-03-01 DIAGNOSIS — R55 SYNCOPE, UNSPECIFIED SYNCOPE TYPE: Primary | ICD-10-CM

## 2019-03-01 PROCEDURE — 93291 INTERROG DEV EVAL SCRMS IP: CPT | Performed by: INTERNAL MEDICINE

## 2019-03-05 ENCOUNTER — OFFICE VISIT (OUTPATIENT)
Dept: UROLOGY | Facility: CLINIC | Age: 84
End: 2019-03-05

## 2019-03-05 VITALS — WEIGHT: 232 LBS | BODY MASS INDEX: 32.48 KG/M2 | HEIGHT: 71 IN

## 2019-03-05 DIAGNOSIS — C61 PROSTATE CANCER (HCC): Primary | ICD-10-CM

## 2019-03-05 PROCEDURE — 99213 OFFICE O/P EST LOW 20 MIN: CPT | Performed by: UROLOGY

## 2019-03-05 NOTE — PROGRESS NOTES
Chief Complaint:          Prostate cancer    HPI:   83 y.o. male.  Pt had HiFu for prostate cancer and his psa increased from 4.2 to 5.6 on 6/2017 this was done on June.  He has had lupron prior to his HIFU.  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  With him being 82 years old he and I agreed that radiation therapy would likely cause decreased quality of life compared to hormonal therapy with lupron.  He has been on lupron since 2010.  Pt is on intermittent hormonal therapy.  His psa is 2.9.  His psa  In 1.9 a year ago and his psa went up to 5 in April of this year.  His psa has increased from 2.9 in September to 10.1      HPI      Past Medical History:        Past Medical History:   Diagnosis Date   • Atrial fibrillation (CMS/HCC)    • Coronary artery disease    • Hyperlipidemia    • Hypertension    • Prostate cancer (CMS/HCC)          Current Meds:     Current Outpatient Medications   Medication Sig Dispense Refill   • amLODIPine (NORVASC) 5 MG tablet take 1 tablet by mouth once daily 90 tablet 3   • apixaban (ELIQUIS) 5 MG tablet tablet Take 1 tablet by mouth Every 12 (Twelve) Hours. 60 tablet 6   • Bioflavonoid Products (SCOTT-C) 500-550 MG tablet Take 1 tablet by mouth Daily.     • Cholecalciferol (VITAMIN D3) 5000 UNITS capsule capsule Take 5,000 Units by mouth Daily.     • flecainide (TAMBOCOR) 50 MG tablet Take 25 mg by mouth 2 (Two) Times a Day.     • folic acid (FOLVITE) 800 MCG tablet Take 800 mcg by mouth daily.     • levothyroxine (SYNTHROID, LEVOTHROID) 75 MCG tablet Take 75 mcg by mouth Daily.  0   • lisinopril (PRINIVIL,ZESTRIL) 5 MG tablet Take 5 mg by mouth Daily.     • niacin (NIACIN SR,NIASPAN ER) 500 MG CR capsule Take 500 mg by mouth Every Night.     • triamterene-hydrochlorothiazide (MAXZIDE-25) 37.5-25 MG per tablet Take 1 tablet by mouth daily.       No current facility-administered medications for this visit.         Allergies:      No  Known Allergies     Past Surgical History:     Past Surgical History:   Procedure Laterality Date   • CARDIAC CATHETERIZATION     • CARDIAC SURGERY      cardiac loop implant   • HERNIA REPAIR           Social History:     Social History     Socioeconomic History   • Marital status:      Spouse name: Not on file   • Number of children: Not on file   • Years of education: Not on file   • Highest education level: Not on file   Social Needs   • Financial resource strain: Not on file   • Food insecurity - worry: Not on file   • Food insecurity - inability: Not on file   • Transportation needs - medical: Not on file   • Transportation needs - non-medical: Not on file   Occupational History   • Not on file   Tobacco Use   • Smoking status: Never Smoker   • Smokeless tobacco: Never Used   Substance and Sexual Activity   • Alcohol use: No   • Drug use: No   • Sexual activity: Defer   Other Topics Concern   • Not on file   Social History Narrative   • Not on file       Family History:     Family History   Problem Relation Age of Onset   • Kidney disease Mother    • Sudden death Other    • Cancer Other        Review of Systems:     Review of Systems   Constitutional: Negative for fatigue.   HENT: Negative for sinus pressure and sinus pain.    Eyes: Negative for pain and redness.   Respiratory: Negative for chest tightness.    Cardiovascular: Negative for chest pain.   Gastrointestinal: Negative for abdominal pain and constipation.   Endocrine: Negative for heat intolerance.   Genitourinary: Positive for frequency and urgency. Negative for dysuria and hematuria.   Musculoskeletal: Negative for back pain.   Allergic/Immunologic: Negative for food allergies.   Neurological: Negative for headaches.   Hematological: Bruises/bleeds easily.   Psychiatric/Behavioral: The patient is not nervous/anxious.              I have reviewed the follow portions of the patient's history and confirmed they are accurate today:  allergies,  "current medications, past family history, past medical history, past social history, past surgical history, problem list and ROS  Physical Exam:     Physical Exam   Constitutional: He is oriented to person, place, and time.   HENT:   Head: Normocephalic and atraumatic.   Right Ear: External ear normal.   Left Ear: External ear normal.   Nose: Nose normal.   Mouth/Throat: Oropharynx is clear and moist.   Eyes: Conjunctivae and EOM are normal. Pupils are equal, round, and reactive to light.   Neck: Normal range of motion. Neck supple. No thyromegaly present.   Cardiovascular: Normal rate, regular rhythm, normal heart sounds and intact distal pulses.   No murmur heard.  Pulmonary/Chest: Effort normal and breath sounds normal. No respiratory distress. He has no wheezes. He has no rales. He exhibits no tenderness.   Abdominal: Soft. Bowel sounds are normal. He exhibits no distension and no mass. There is no tenderness. No hernia.   Genitourinary: Rectum normal and penis normal.   Musculoskeletal: Normal range of motion. He exhibits no edema or tenderness.   Lymphadenopathy:     He has no cervical adenopathy.   Neurological: He is alert and oriented to person, place, and time. No cranial nerve deficit. He exhibits normal muscle tone. Coordination normal.   Skin: Skin is warm. No rash noted.   Psychiatric: He has a normal mood and affect. His behavior is normal. Judgment and thought content normal.   Nursing note and vitals reviewed.      Ht 180.3 cm (71\")   Wt 105 kg (232 lb)   BMI 32.36 kg/m²    Procedure:         Assessment:     Encounter Diagnosis   Name Primary?   • Prostate cancer (CMS/HCC) Yes     No orders of the defined types were placed in this encounter.      Plan:   Pt has been on intermittent hormonal therapy and the frequency of lupron injections seems to be increasing.  Will give him a lupron today and will repeat his psa in 3 months and check to see what his zhane is.  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    Patient's Body mass index is 32.36 kg/m². BMI is above normal parameters. Recommendations include: educational material.      Counseling was given to patient for the following topics diagnostic results including: psa. The interim medical history and current results were reviewed.  A treatment plan with follow-up was made for Prostate cancer (CMS/[facility]) [C61].    This document has been electronically signed by Brett Talavera MD March 5, 2019 4:08 PM

## 2019-03-07 ENCOUNTER — TELEPHONE (OUTPATIENT)
Dept: UROLOGY | Facility: CLINIC | Age: 84
End: 2019-03-07

## 2019-03-07 NOTE — TELEPHONE ENCOUNTER
Dr ochoa wanted patient to get an Lupron injection. The patient has not had an lupron for a year and it needed to be authorization. Patient is coming the following Tuesday to give us enough time to get the authorization for his Lupron.

## 2019-03-11 ENCOUNTER — PRIOR AUTHORIZATION (OUTPATIENT)
Dept: UROLOGY | Facility: CLINIC | Age: 84
End: 2019-03-11

## 2019-03-11 NOTE — TELEPHONE ENCOUNTER
Due to disaster notifications authorization is not required for lupron at this time.     Number to call for next PA initiation is [phone]

## 2019-03-12 ENCOUNTER — OFFICE VISIT (OUTPATIENT)
Dept: UROLOGY | Facility: CLINIC | Age: 84
End: 2019-03-12

## 2019-03-12 VITALS — WEIGHT: 232 LBS | HEIGHT: 71 IN | BODY MASS INDEX: 32.48 KG/M2

## 2019-03-12 DIAGNOSIS — C61 PROSTATE CANCER (HCC): Primary | ICD-10-CM

## 2019-03-12 PROCEDURE — 96402 CHEMO HORMON ANTINEOPL SQ/IM: CPT | Performed by: UROLOGY

## 2019-04-05 ENCOUNTER — OFFICE VISIT (OUTPATIENT)
Dept: CARDIOLOGY | Facility: CLINIC | Age: 84
End: 2019-04-05

## 2019-04-05 DIAGNOSIS — R55 SYNCOPE, UNSPECIFIED SYNCOPE TYPE: Primary | ICD-10-CM

## 2019-04-05 PROCEDURE — 93291 INTERROG DEV EVAL SCRMS IP: CPT | Performed by: INTERNAL MEDICINE

## 2019-04-24 ENCOUNTER — OFFICE VISIT (OUTPATIENT)
Dept: CARDIOLOGY | Facility: CLINIC | Age: 84
End: 2019-04-24

## 2019-04-24 VITALS
DIASTOLIC BLOOD PRESSURE: 71 MMHG | HEIGHT: 71 IN | OXYGEN SATURATION: 99 % | SYSTOLIC BLOOD PRESSURE: 142 MMHG | BODY MASS INDEX: 32.34 KG/M2 | WEIGHT: 231 LBS | HEART RATE: 69 BPM

## 2019-04-24 DIAGNOSIS — R00.1 BRADYCARDIA: ICD-10-CM

## 2019-04-24 DIAGNOSIS — I48.0 PAROXYSMAL ATRIAL FIBRILLATION (HCC): Primary | ICD-10-CM

## 2019-04-24 DIAGNOSIS — I10 ESSENTIAL HYPERTENSION: ICD-10-CM

## 2019-04-24 PROCEDURE — 99213 OFFICE O/P EST LOW 20 MIN: CPT | Performed by: PHYSICIAN ASSISTANT

## 2019-04-24 NOTE — PATIENT INSTRUCTIONS

## 2019-04-24 NOTE — PROGRESS NOTES
Problem list     Subjective   Martín Campa is a 83 y.o. male     Chief Complaint   Patient presents with   • Atrial Fibrillation   Problem List:  1. Atrial fibrillation with RVR during hospitalization, October 2014.  1.1 Status post cardioversion, December 2014.  1.2 Residual A flutter, despite increased dosing of Flecainide.  1.3 Repeat Cardioversion, 09/2016, with maintenance of SR with Flecainide.  2. 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 Hypertension  4. Dyslipidemia      5. Prostate cancer      6. First degree AVB    HPI  The patient presents today for follow-up of interrogation results of the implantable loop recorder.  He had an interrogation previously which indicated intermittent episodes of atrial fibrillation.  We are concerned because he does take flecainide.  Flecainide was decreased previously during hospitalization however because this was felt to be a potential cause of syncope.  He has had no further syncopal episodes since decreasing the flecainide therapy.  It was also felt that the patient had borderline dehydration as a potential cause of his syncope.  Irregardless, the patient feels well.  He really has no sensed episodes of atrial fibrillation.  We have discussed consideration for choosing alternate antidysrhythmic therapy.  The patient is comfortable with flecainide as he feels well on it.  He is willing to accept some breakthrough episodes of atrial fibrillation.  Otherwise he is doing well on anticoagulation therapy.  He has no chest pain.  He has no symptoms of dysrhythmias otherwise.  His dyspnea is stable.  He has no further complaints.    Current Outpatient Medications   Medication Sig Dispense Refill   • amLODIPine (NORVASC) 5 MG tablet take 1 tablet by mouth once daily 90 tablet 3   • apixaban (ELIQUIS) 5 MG tablet tablet Take 1 tablet by mouth Every 12 (Twelve) Hours. 60 tablet 6   • Bioflavonoid Products (SCOTT-C) 500-550 MG tablet Take 1 tablet by mouth Daily.     •  Cholecalciferol (VITAMIN D3) 5000 UNITS capsule capsule Take 5,000 Units by mouth Daily.     • flecainide (TAMBOCOR) 50 MG tablet Take 25 mg by mouth 2 (Two) Times a Day.     • folic acid (FOLVITE) 800 MCG tablet Take 800 mcg by mouth daily.     • levothyroxine (SYNTHROID, LEVOTHROID) 75 MCG tablet Take 75 mcg by mouth Daily.  0   • lisinopril (PRINIVIL,ZESTRIL) 5 MG tablet Take 5 mg by mouth Daily.     • niacin (NIACIN SR,NIASPAN ER) 500 MG CR capsule Take 500 mg by mouth Every Night.     • triamterene-hydrochlorothiazide (MAXZIDE-25) 37.5-25 MG per tablet Take 1 tablet by mouth daily.       No current facility-administered medications for this visit.        Patient has no known allergies.    Past Medical History:   Diagnosis Date   • Atrial fibrillation (CMS/HCC)    • Coronary artery disease    • Hyperlipidemia    • Hypertension    • Prostate cancer (CMS/HCC)        Social History     Socioeconomic History   • Marital status:      Spouse name: Not on file   • Number of children: Not on file   • Years of education: Not on file   • Highest education level: Not on file   Tobacco Use   • Smoking status: Never Smoker   • Smokeless tobacco: Never Used   Substance and Sexual Activity   • Alcohol use: No   • Drug use: No   • Sexual activity: Defer       Family History   Problem Relation Age of Onset   • Kidney disease Mother    • Sudden death Other    • Cancer Other        Review of Systems   Constitutional: Positive for fatigue (easily fatigued).   HENT: Negative.  Negative for congestion, rhinorrhea and sneezing.    Eyes: Positive for visual disturbance (wears glasses PRN).   Respiratory: Negative.  Negative for cough, chest tightness, shortness of breath and wheezing.    Cardiovascular: Positive for leg swelling (BLE slight swelling/edema). Negative for chest pain and palpitations.   Gastrointestinal: Negative.  Negative for abdominal pain, nausea and vomiting.   Endocrine: Negative.  Negative for cold  "intolerance and heat intolerance.   Genitourinary: Negative.  Negative for difficulty urinating, frequency and urgency.   Musculoskeletal: Positive for arthralgias (joints) and myalgias (muscle aches from recent falls). Negative for back pain, neck pain and neck stiffness.   Skin: Negative.  Negative for wound.   Allergic/Immunologic: Negative.  Negative for environmental allergies and food allergies.   Neurological: Negative.  Negative for dizziness, syncope (denies syncope since last OV), weakness, light-headedness and headaches.   Hematological: Negative.  Does not bruise/bleed easily.   Psychiatric/Behavioral: Negative.  Negative for agitation, confusion and sleep disturbance (denies waking up smothering/SOA). The patient is not nervous/anxious.        Objective   Vitals:    04/24/19 1529   BP: 142/71   BP Location: Left arm   Patient Position: Sitting   Pulse: 69   SpO2: 99%   Weight: 105 kg (231 lb)   Height: 180.3 cm (71\")      /71 (BP Location: Left arm, Patient Position: Sitting)   Pulse 69   Ht 180.3 cm (71\")   Wt 105 kg (231 lb)   SpO2 99%   BMI 32.22 kg/m²    Lab Results (most recent)     None        Physical Exam   Constitutional: He is oriented to person, place, and time. He appears well-developed and well-nourished. No distress.   HENT:   Head: Normocephalic and atraumatic.   Eyes: Conjunctivae and EOM are normal. Pupils are equal, round, and reactive to light.   Neck: Normal range of motion. Neck supple. No JVD present. No tracheal deviation present.   Cardiovascular: Normal rate, regular rhythm and intact distal pulses.   Murmur (Soft SHAD) heard.   Systolic murmur is present with a grade of 1/6.  Pulmonary/Chest: Effort normal and breath sounds normal.   Abdominal: Soft. Bowel sounds are normal. He exhibits no distension and no mass. There is no tenderness. There is no rebound and no guarding.   Musculoskeletal: Normal range of motion. He exhibits no edema, tenderness or deformity. "   Neurological: He is alert and oriented to person, place, and time.   Skin: Skin is warm and dry. No rash noted. No erythema. No pallor.   Psychiatric: He has a normal mood and affect. His behavior is normal. Judgment and thought content normal.   Nursing note and vitals reviewed.        Procedure   Procedures       Assessment/Plan      Diagnosis Plan   1. Paroxysmal atrial fibrillation (CMS/HCC)     2. Essential hypertension     3. Bradycardia         As above, the patient presents in today for follow-up of implantable loop recorder interrogation findings.  He had a few episodes of A. fib.  I feel he is having breakthrough episodes of A. fib at this time because of previous decrease in flecainide.  Flecainide was decreased because of syncope during his hospitalization.  That was done to an alternate cardiology service.  The patient tells me however that he feels very well at this time.  He does not have any significant symptoms when he does have A. fib.  These are very brief and nonproblematic.  He has felt much improved on the lower dose of flecainide.  He is willing to tolerate some degree of A. fib because he feels so well on current dosing of medications.    In the setting, I will continue flecainide his dose.  We will continue anticoagulation therapy.  He was initially scheduled for his implantable loop recorder placement because of symptomatic bradycardia and history of syncope otherwise.  He has had no significant bradycardia dysrhythmic activity.  We will continue to follow that with routine interrogations of that device.    Also, blood pressures remained fairly well controlled on current antihypertensive therapy.  I will make no changes in that regard.    At that setting, we will continue to see him routinely through the clinic.  He will continue medications as dose.  If we see future problems on interrogations we will see him immediately through the clinic.  He will call for any complications.   Otherwise, I feel he is doing well at this time.         Patient's Body mass index is 32.22 kg/m². BMI is above normal parameters. Recommendations include: educational material and referral to primary care.             Electronically signed by:

## 2019-04-26 ENCOUNTER — OFFICE VISIT (OUTPATIENT)
Dept: CARDIOLOGY | Facility: CLINIC | Age: 84
End: 2019-04-26

## 2019-04-26 VITALS
BODY MASS INDEX: 31.22 KG/M2 | HEIGHT: 71 IN | SYSTOLIC BLOOD PRESSURE: 112 MMHG | DIASTOLIC BLOOD PRESSURE: 70 MMHG | WEIGHT: 223 LBS | HEART RATE: 83 BPM

## 2019-04-26 DIAGNOSIS — R55 SYNCOPE, UNSPECIFIED SYNCOPE TYPE: ICD-10-CM

## 2019-04-26 DIAGNOSIS — R00.1 BRADYCARDIA: ICD-10-CM

## 2019-04-26 DIAGNOSIS — I48.0 PAROXYSMAL ATRIAL FIBRILLATION (HCC): Primary | ICD-10-CM

## 2019-04-26 DIAGNOSIS — Z79.899 LONG TERM CURRENT USE OF ANTIARRHYTHMIC MEDICAL THERAPY: ICD-10-CM

## 2019-04-26 PROCEDURE — 99214 OFFICE O/P EST MOD 30 MIN: CPT | Performed by: INTERNAL MEDICINE

## 2019-04-26 PROCEDURE — 93000 ELECTROCARDIOGRAM COMPLETE: CPT | Performed by: INTERNAL MEDICINE

## 2019-04-26 NOTE — PROGRESS NOTES
Martín Campa  1935  PCP: [place] MD    SUBJECTIVE:   Martín Campa is a 83 y.o. male seen for a follow up visit regarding the following:     Chief Complaint: Follow up for A. Fib, Tachy/Aidan    HPI:    Since last visit the patient's status has been stable. No Further syncope events.     History:       Cardiac PMH: (Old records have been reviewed and summarized below)  1. Atrial fibrillation with RVR during hospitalization, October 2014.                    A. Status post cardioversion, December 2014. EF 60-65%                    B. Had been initially started on amiodarone but stopped about one year ago (was on for 10 months) due to thyroid issues, on synthroid                    C. Residual A flutter/Afib, despite increased dosing of Flecainide now on 100 mg BID. No recent cardioversions                    D. CHADS-VASC= 3, On Eliquis 5mg BID. Recently decreased to 2.5 mg BID due to nose bleeds                    E. On Eliquis 5 mg BID, Flec 100 mg BID ECV in Sept 2016. Maintaining SR and not feeling any real difference in Afib vs SR                    F. Episode of Afib noted on Event Monitor with symptoms of LH noted with slow HRs      2. 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 Hypertension  4. Dyslipidemia      5. Prostate cancer          Current Outpatient Medications:   •  amLODIPine (NORVASC) 5 MG tablet, take 1 tablet by mouth once daily, Disp: 90 tablet, Rfl: 3  •  apixaban (ELIQUIS) 5 MG tablet tablet, Take 1 tablet by mouth Every 12 (Twelve) Hours., Disp: 60 tablet, Rfl: 6  •  Bioflavonoid Products (SCOTT-C) 500-550 MG tablet, Take 1 tablet by mouth Daily., Disp: , Rfl:   •  Cholecalciferol (VITAMIN D3) 5000 UNITS capsule capsule, Take 5,000 Units by mouth Daily., Disp: , Rfl:   •  flecainide (TAMBOCOR) 50 MG tablet, Take 25 mg by mouth 2 (Two) Times a Day., Disp: , Rfl:   •  folic acid (FOLVITE) 800 MCG tablet, Take 800 mcg by mouth daily., Disp: , Rfl:   •  levothyroxine  "(SYNTHROID, LEVOTHROID) 75 MCG tablet, Take 75 mcg by mouth Daily., Disp: , Rfl: 0  •  lisinopril (PRINIVIL,ZESTRIL) 5 MG tablet, Take 5 mg by mouth Daily., Disp: , Rfl:   •  niacin (NIACIN SR,NIASPAN ER) 500 MG CR capsule, Take 500 mg by mouth Every Night., Disp: , Rfl:   •  triamterene-hydrochlorothiazide (MAXZIDE-25) 37.5-25 MG per tablet, Take 1 tablet by mouth daily., Disp: , Rfl:     Past Medical History, Past Surgical History, Family history, Social History, and Medications were all reviewed with the patient today and updated as necessary.       Patient Active Problem List   Diagnosis   • Atrial fibrillation (CMS/HCC)   • Coronary arteriosclerosis in native artery   • Dyslipidemia   • Hypertension   • Impotence of organic origin   • Induratio penis plastica   • Malignant neoplasm of prostate (CMS/HCC)   • Atrial flutter (CMS/HCC)   • Prostate cancer (CMS/HCC)   • Syncope   • Bradycardia     No Known Allergies  Past Medical History:   Diagnosis Date   • Atrial fibrillation (CMS/HCC)    • Coronary artery disease    • Hyperlipidemia    • Hypertension    • Prostate cancer (CMS/HCC)      Past Surgical History:   Procedure Laterality Date   • CARDIAC CATHETERIZATION     • CARDIAC SURGERY      cardiac loop implant   • HERNIA REPAIR       Family History   Problem Relation Age of Onset   • Kidney disease Mother    • Sudden death Other    • Cancer Other      Social History     Tobacco Use   • Smoking status: Never Smoker   • Smokeless tobacco: Never Used   Substance Use Topics   • Alcohol use: No         PHYSICAL EXAM:    /70 (BP Location: Right arm, Patient Position: Sitting)   Pulse 83   Ht 180.3 cm (71\")   Wt 101 kg (223 lb)   BMI 31.10 kg/m²        Wt Readings from Last 5 Encounters:   04/26/19 101 kg (223 lb)   04/24/19 105 kg (231 lb)   03/12/19 105 kg (232 lb)   03/05/19 105 kg (232 lb)   02/21/19 106 kg (232 lb 9.6 oz)       BP Readings from Last 5 Encounters:   04/26/19 112/70   04/24/19 142/71 "   02/21/19 112/60   01/07/19 124/71   11/02/18 141/76       General-Well Nourished, Well developed  Eyes - PERRLA  Neck- supple, No mass  CV- regular rate and rhythm, no MRG, No edema  Lung- clear bilaterally  Abd- soft, +BS  Musc/skel - Norm strength and range of motion  Skin- warm and dry  Neuro - Alert & Oriented x 3, appropriate mood.        Medical problems and test results were reviewed with the patient today.     No results found for this or any previous visit (from the past 672 hour(s)).      EKG: (EKG has been independently visualized by me and summarized below)      ECG 12 Lead  Date/Time: 4/26/2019 2:10 PM  Performed by: Bryan Fuller MD  Authorized by: Bryan Fuller MD   Rhythm: atrial fibrillation  Rate: normal  BPM: 83  Other findings: non-specific ST-T wave changes    Clinical impression: abnormal EKG             ASSESSMENT and PLAN  1. Paroxysmal AF - On Flecainide and Eliquis. No Symptoms, stable rates  2. Bradycardia - Has a loop recorder in place - Monitor for symptoms  3. Syncope - Now a loop recorder to monitor  4. Essential hypertension - BP well-controlled  5. Flecainide use - Monitoring EKG      Return in about 6 months (around 10/26/2019).        Bryan Fuller M.D., F.A.C.C, F.H.R.S.  Cardiology/Electrophysiology  4/26/2019  2:12 PM

## 2019-05-17 ENCOUNTER — OFFICE VISIT (OUTPATIENT)
Dept: CARDIOLOGY | Facility: CLINIC | Age: 84
End: 2019-05-17

## 2019-05-17 DIAGNOSIS — R55 SYNCOPE, UNSPECIFIED SYNCOPE TYPE: Primary | ICD-10-CM

## 2019-05-17 PROCEDURE — 93285 PRGRMG DEV EVAL SCRMS IP: CPT | Performed by: INTERNAL MEDICINE

## 2019-05-29 DIAGNOSIS — I10 ESSENTIAL HYPERTENSION: ICD-10-CM

## 2019-05-29 RX ORDER — AMLODIPINE BESYLATE 5 MG/1
TABLET ORAL
Qty: 90 TABLET | Refills: 0 | Status: SHIPPED | OUTPATIENT
Start: 2019-05-29 | End: 2019-08-26 | Stop reason: SDUPTHER

## 2019-06-11 ENCOUNTER — OFFICE VISIT (OUTPATIENT)
Dept: UROLOGY | Facility: CLINIC | Age: 84
End: 2019-06-11

## 2019-06-11 VITALS — BODY MASS INDEX: 31.22 KG/M2 | WEIGHT: 223 LBS | HEIGHT: 71 IN

## 2019-06-11 DIAGNOSIS — C61 PROSTATE CANCER (HCC): Primary | ICD-10-CM

## 2019-06-11 PROCEDURE — 99213 OFFICE O/P EST LOW 20 MIN: CPT | Performed by: UROLOGY

## 2019-06-11 NOTE — PROGRESS NOTES
Chief Complaint:          Prostate cancer.    HPI:   83 y.o. male.  His psa went down to 4.4 on 6/3/19  HPI  Pt had HiFu for prostate cancer and his psa increased from 4.2 to 5.6 on 6/2017 this was done on June.  He has had lupron prior to his HIFU.  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  With him being 82 years old he and I agreed that radiation therapy would likely cause decreased quality of life compared to hormonal therapy with lupron.  He has been on lupron since 2010.  Pt is on intermittent hormonal therapy.  His psa is 2.9.  His psa  In 1.9 a year ago and his psa went up to 5 in April of this year.  His psa has increased from 2.9 in September to 10.1 Now it has decreased to 4.4  He had the lupron in march 2019  He has noticed some breast tenderness.      Past Medical History:        Past Medical History:   Diagnosis Date   • Atrial fibrillation (CMS/HCC)    • Coronary artery disease    • Hyperlipidemia    • Hypertension    • Prostate cancer (CMS/HCC)          Current Meds:     Current Outpatient Medications   Medication Sig Dispense Refill   • amLODIPine (NORVASC) 5 MG tablet TAKE 1 TABLET BY MOUTH EVERY DAY 90 tablet 0   • apixaban (ELIQUIS) 5 MG tablet tablet Take 1 tablet by mouth Every 12 (Twelve) Hours. 60 tablet 6   • Bioflavonoid Products (SCOTT-C) 500-550 MG tablet Take 1 tablet by mouth Daily.     • Cholecalciferol (VITAMIN D3) 5000 UNITS capsule capsule Take 5,000 Units by mouth Daily.     • flecainide (TAMBOCOR) 50 MG tablet Take 25 mg by mouth 2 (Two) Times a Day.     • folic acid (FOLVITE) 800 MCG tablet Take 800 mcg by mouth daily.     • levothyroxine (SYNTHROID, LEVOTHROID) 75 MCG tablet Take 75 mcg by mouth Daily.  0   • lisinopril (PRINIVIL,ZESTRIL) 5 MG tablet Take 5 mg by mouth Daily.     • niacin (NIACIN SR,NIASPAN ER) 500 MG CR capsule Take 500 mg by mouth Every Night.     • triamterene-hydrochlorothiazide (MAXZIDE-25) 37.5-25 MG per  "tablet Take 1 tablet by mouth daily.       No current facility-administered medications for this visit.         Allergies:      No Known Allergies     Past Surgical History:     Past Surgical History:   Procedure Laterality Date   • CARDIAC CATHETERIZATION     • CARDIAC SURGERY      cardiac loop implant   • HERNIA REPAIR           Social History:     Social History     Socioeconomic History   • Marital status:      Spouse name: Not on file   • Number of children: Not on file   • Years of education: Not on file   • Highest education level: Not on file   Tobacco Use   • Smoking status: Never Smoker   • Smokeless tobacco: Never Used   Substance and Sexual Activity   • Alcohol use: No   • Drug use: No   • Sexual activity: Defer       Family History:     Family History   Problem Relation Age of Onset   • Kidney disease Mother    • Sudden death Other    • Cancer Other        Review of Systems:     Review of Systems   Constitutional: Negative for chills and fever.   HENT: Negative for sinus pressure and sinus pain.    Respiratory: Negative for cough.    Cardiovascular: Negative for leg swelling.   Gastrointestinal: Negative for abdominal pain.   Genitourinary: Negative for dysuria and urgency.   Musculoskeletal: Negative for back pain.   Neurological: Negative for headaches.   Psychiatric/Behavioral: The patient is not nervous/anxious.          Physical Exam:     Physical Exam    Ht 180.3 cm (71\")   Wt 101 kg (223 lb)   BMI 31.10 kg/m²    Procedure:         Assessment:     Encounter Diagnosis   Name Primary?   • Prostate cancer (CMS/[facility]) Yes       Orders Placed This Encounter   Procedures   • PSA DIAGNOSTIC     Standing Status:   Future     Standing Expiration Date:   6/11/2020       Patient reports that he is not currently experiencing any symptoms of urinary incontinence.      Plan:   Will repeat his psa in 3 months and consider resuming lupron.    Patient's Body mass index is 31.1 kg/m². BMI is above normal " parameters. Recommendations include: referral to primary care.      I advised Martín of the risks of continuing to use tobacco, and I provided him with tobacco cessation educational materials in the After Visit Summary.     Counseling was given to patient for the following topics diagnostic results including: psa. The interim medical history and current results were reviewed.  A treatment plan with follow-up was made for Prostate cancer (CMS/[facility]) [C61]. I spent 21 minutes face to face with Martín Campa and 75 percentage was spent in counseling.       This document has been electronically signed by Brett Talavera MD June 14, 2019 7:03 AM

## 2019-06-21 ENCOUNTER — OFFICE VISIT (OUTPATIENT)
Dept: CARDIOLOGY | Facility: CLINIC | Age: 84
End: 2019-06-21

## 2019-06-21 DIAGNOSIS — R55 SYNCOPE, UNSPECIFIED SYNCOPE TYPE: Primary | ICD-10-CM

## 2019-06-21 PROCEDURE — 93291 INTERROG DEV EVAL SCRMS IP: CPT | Performed by: INTERNAL MEDICINE

## 2019-07-01 DIAGNOSIS — I48.0 PAROXYSMAL ATRIAL FIBRILLATION (HCC): ICD-10-CM

## 2019-07-01 RX ORDER — APIXABAN 5 MG/1
TABLET, FILM COATED ORAL
Qty: 60 TABLET | Refills: 0 | Status: SHIPPED | OUTPATIENT
Start: 2019-07-01 | End: 2019-08-01 | Stop reason: SDUPTHER

## 2019-07-19 ENCOUNTER — OFFICE VISIT (OUTPATIENT)
Dept: CARDIOLOGY | Facility: CLINIC | Age: 84
End: 2019-07-19

## 2019-07-19 DIAGNOSIS — R55 SYNCOPE, UNSPECIFIED SYNCOPE TYPE: Primary | ICD-10-CM

## 2019-07-19 PROCEDURE — 93291 INTERROG DEV EVAL SCRMS IP: CPT | Performed by: INTERNAL MEDICINE

## 2019-08-01 DIAGNOSIS — I48.0 PAROXYSMAL ATRIAL FIBRILLATION (HCC): ICD-10-CM

## 2019-08-01 RX ORDER — APIXABAN 5 MG/1
TABLET, FILM COATED ORAL
Qty: 180 TABLET | Refills: 3 | Status: SHIPPED | OUTPATIENT
Start: 2019-08-01 | End: 2019-09-03 | Stop reason: SDUPTHER

## 2019-08-16 ENCOUNTER — OFFICE VISIT (OUTPATIENT)
Dept: CARDIOLOGY | Facility: CLINIC | Age: 84
End: 2019-08-16

## 2019-08-16 DIAGNOSIS — R55 SYNCOPE, UNSPECIFIED SYNCOPE TYPE: Primary | ICD-10-CM

## 2019-08-16 PROCEDURE — 93291 INTERROG DEV EVAL SCRMS IP: CPT | Performed by: INTERNAL MEDICINE

## 2019-08-22 ENCOUNTER — OFFICE VISIT (OUTPATIENT)
Dept: CARDIOLOGY | Facility: CLINIC | Age: 84
End: 2019-08-22

## 2019-08-22 VITALS
HEIGHT: 71 IN | SYSTOLIC BLOOD PRESSURE: 141 MMHG | WEIGHT: 225.8 LBS | DIASTOLIC BLOOD PRESSURE: 70 MMHG | HEART RATE: 75 BPM | BODY MASS INDEX: 31.61 KG/M2 | OXYGEN SATURATION: 100 %

## 2019-08-22 DIAGNOSIS — R06.09 DYSPNEA ON EXERTION: ICD-10-CM

## 2019-08-22 DIAGNOSIS — I48.0 PAROXYSMAL ATRIAL FIBRILLATION (HCC): Primary | ICD-10-CM

## 2019-08-22 DIAGNOSIS — I10 ESSENTIAL HYPERTENSION: ICD-10-CM

## 2019-08-22 PROCEDURE — 99213 OFFICE O/P EST LOW 20 MIN: CPT | Performed by: PHYSICIAN ASSISTANT

## 2019-08-22 NOTE — PATIENT INSTRUCTIONS

## 2019-08-22 NOTE — PROGRESS NOTES
Problem list     Subjective   Martín Campa is a 84 y.o. male     Chief Complaint   Patient presents with   • Atrial Fibrillation   Problem List:  1. Atrial fibrillation with RVR during hospitalization, October 2014.  1.1 Status post cardioversion, December 2014.  1.2 Residual A flutter, despite increased dosing of Flecainide.  1.3 Repeat Cardioversion, 09/2016, with maintenance of SR with Flecainide.  2. 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 Hypertension  4. Dyslipidemia      5. Prostate cancer      6. First degree AVB    HPI  The patient presents in today for routine evaluation follow-up.  Currently, the patient is doing well by his report.  He has chronic lower extremity edema.  He has recurrent episodes of A. fib by loop recorder interrogations.  The patient does not sense this.  He has had this basically since decreasing flecainide.  Flecainide was decreased during hospitalization because of recurrent syncopal episodes.  That was done by Dr. Hopkins.  Since decreasing that however the patient has had no further syncopal episodes.  Currently he has no dizziness.  He has no chest pain.  He reports stable blood pressure results.  He has no further complaints otherwise.    Current Outpatient Medications   Medication Sig Dispense Refill   • amLODIPine (NORVASC) 5 MG tablet TAKE 1 TABLET BY MOUTH EVERY DAY 90 tablet 0   • Bioflavonoid Products (SCOTT-C) 500-550 MG tablet Take 1 tablet by mouth Daily.     • Cholecalciferol (VITAMIN D3) 5000 UNITS capsule capsule Take 5,000 Units by mouth Daily.     • ELIQUIS 5 MG tablet tablet TAKE ONE TABLET BY MOUTH EVERY 12 HOURS 180 tablet 3   • flecainide (TAMBOCOR) 50 MG tablet Take 25 mg by mouth 2 (Two) Times a Day.     • folic acid (FOLVITE) 800 MCG tablet Take 800 mcg by mouth daily.     • levothyroxine (SYNTHROID, LEVOTHROID) 75 MCG tablet Take 75 mcg by mouth Daily.  0   • lisinopril (PRINIVIL,ZESTRIL) 5 MG tablet Take 5 mg by mouth Daily.     • niacin (NIACIN  SR,NIASPAN ER) 500 MG CR capsule Take 500 mg by mouth Every Night.     • triamterene-hydrochlorothiazide (MAXZIDE-25) 37.5-25 MG per tablet Take 1 tablet by mouth daily.       No current facility-administered medications for this visit.        Patient has no known allergies.    Past Medical History:   Diagnosis Date   • Atrial fibrillation (CMS/HCC)    • Coronary artery disease    • Hyperlipidemia    • Hypertension    • Prostate cancer (CMS/HCC)        Social History     Socioeconomic History   • Marital status:      Spouse name: Not on file   • Number of children: Not on file   • Years of education: Not on file   • Highest education level: Not on file   Tobacco Use   • Smoking status: Never Smoker   • Smokeless tobacco: Never Used   Substance and Sexual Activity   • Alcohol use: No   • Drug use: No   • Sexual activity: Defer       Family History   Problem Relation Age of Onset   • Kidney disease Mother    • Sudden death Other    • Cancer Other        Review of Systems   Constitutional: Positive for fatigue (easily fatigued).   HENT: Negative.  Negative for congestion, rhinorrhea and sneezing.    Eyes: Positive for visual disturbance (wears reading glasses PRN).   Respiratory: Positive for shortness of breath (easily SOA  on exertion ). Negative for cough, chest tightness and wheezing.    Cardiovascular: Positive for leg swelling (BLE swelling/edema). Negative for chest pain and palpitations.   Gastrointestinal: Negative.  Negative for abdominal pain, nausea and vomiting.   Endocrine: Negative.  Negative for cold intolerance and heat intolerance.   Genitourinary: Positive for frequency (urinary frequency). Negative for difficulty urinating and urgency.   Musculoskeletal: Positive for arthralgias (joints). Negative for back pain, neck pain and neck stiffness.   Skin: Negative.  Negative for rash and wound.   Allergic/Immunologic: Negative.  Negative for environmental allergies and food allergies.  "  Neurological: Negative.  Negative for dizziness, syncope, weakness and light-headedness.   Hematological: Negative.  Does not bruise/bleed easily.   Psychiatric/Behavioral: Negative.  Negative for agitation, confusion and sleep disturbance (denies waking up smothering/SOA). The patient is not nervous/anxious.        Objective   Vitals:    08/22/19 1458   BP: 141/70   BP Location: Left arm   Patient Position: Sitting   Pulse: 75   SpO2: 100%   Weight: 102 kg (225 lb 12.8 oz)   Height: 180.3 cm (71\")      /70 (BP Location: Left arm, Patient Position: Sitting)   Pulse 75   Ht 180.3 cm (71\")   Wt 102 kg (225 lb 12.8 oz)   SpO2 100%   BMI 31.49 kg/m²    Lab Results (most recent)     None        Physical Exam   Constitutional: He is oriented to person, place, and time. He appears well-developed and well-nourished. No distress.   HENT:   Head: Normocephalic and atraumatic.   Eyes: Conjunctivae and EOM are normal. Pupils are equal, round, and reactive to light.   Neck: Normal range of motion. Neck supple. No JVD present. No tracheal deviation present.   Cardiovascular: Normal rate, regular rhythm and intact distal pulses.   Murmur (Soft SHAD) heard.   Systolic murmur is present with a grade of 1/6.  Pulmonary/Chest: Effort normal and breath sounds normal.   Abdominal: Soft. Bowel sounds are normal. He exhibits no distension and no mass. There is no tenderness. There is no rebound and no guarding.   Musculoskeletal: Normal range of motion. He exhibits no edema, tenderness or deformity.   Neurological: He is alert and oriented to person, place, and time.   Skin: Skin is warm and dry. No rash noted. No erythema. No pallor.   Psychiatric: He has a normal mood and affect. His behavior is normal. Judgment and thought content normal.   Nursing note and vitals reviewed.        Procedure   Procedures       Assessment/Plan      Diagnosis Plan   1. Paroxysmal atrial fibrillation (CMS/HCC)     2. Essential hypertension   "   3. Dyspnea on exertion         1.  At this time, the patient appears to be doing well.  He still has intermittent episodes of atrial fibrillation per an plantable loop recorder interrogations.  He is very much stable and without complication related to the same.  I would continue his flecainide, previously lowered as above, without change.  As he is doing well, I feel nothing further would be indicated.  I will continue anticoagulation therapy at this time as well.    2.  Blood pressures remain fairly well treated.  We will continue antihypertensive therapies without change.  He will monitor blood pressures closely at home and call us for any issues.    3.  His dyspnea and lower extremity edema is stable.  He has no other real issues or symptoms from cardiovascular standpoint.  I feel nothing further is warranted at this time and we will see him on 6-month intervals.         Patient's Body mass index is 31.49 kg/m². BMI is above normal parameters. Recommendations include: educational material and referral to primary care.             Electronically signed by:

## 2019-08-26 ENCOUNTER — TELEPHONE (OUTPATIENT)
Dept: CARDIOLOGY | Facility: CLINIC | Age: 84
End: 2019-08-26

## 2019-08-26 DIAGNOSIS — I10 ESSENTIAL HYPERTENSION: ICD-10-CM

## 2019-08-26 RX ORDER — AMLODIPINE BESYLATE 5 MG/1
5 TABLET ORAL DAILY
Qty: 90 TABLET | Refills: 3 | Status: SHIPPED | OUTPATIENT
Start: 2019-08-26 | End: 2020-06-01 | Stop reason: SDUPTHER

## 2019-08-26 NOTE — TELEPHONE ENCOUNTER
REFILLS ELECTRO'D AS REQUESTED. ALICIA CORONEL          ----- Message from Houston Fierro sent at 8/26/2019  1:26 PM EDT -----  Pt needs refills.    Medication: AMLODIPINE BESYLATE 5 MG  Pharmacy: [facility]

## 2019-08-29 ENCOUNTER — PRIOR AUTHORIZATION (OUTPATIENT)
Dept: UROLOGY | Facility: CLINIC | Age: 84
End: 2019-08-29

## 2019-08-29 NOTE — TELEPHONE ENCOUNTER
Abdirashid Thompson - spoke to Chantel who transferred me to Geeta P = oncology nurse.    PA # [id number] valid from 9-10-19 til 9-9-20.

## 2019-09-03 DIAGNOSIS — I48.0 PAROXYSMAL ATRIAL FIBRILLATION (HCC): ICD-10-CM

## 2019-09-05 ENCOUNTER — TELEPHONE (OUTPATIENT)
Dept: UROLOGY | Facility: CLINIC | Age: 84
End: 2019-09-05

## 2019-09-05 NOTE — TELEPHONE ENCOUNTER
The pt called and needed his PSA lab order faxed to [facility]. I printed the order and faxed it over to them. As soon as I get confirmation I will scan it into pt chart.

## 2019-09-10 ENCOUNTER — OFFICE VISIT (OUTPATIENT)
Dept: UROLOGY | Facility: CLINIC | Age: 84
End: 2019-09-10

## 2019-09-10 VITALS — BODY MASS INDEX: 31.36 KG/M2 | HEIGHT: 71 IN | WEIGHT: 224 LBS

## 2019-09-10 DIAGNOSIS — C61 PROSTATE CANCER (HCC): Primary | ICD-10-CM

## 2019-09-10 DIAGNOSIS — N39.41 URGENCY INCONTINENCE: ICD-10-CM

## 2019-09-10 PROCEDURE — 99213 OFFICE O/P EST LOW 20 MIN: CPT | Performed by: UROLOGY

## 2019-09-10 RX ORDER — TROSPIUM CHLORIDE ER 60 MG/1
60 CAPSULE ORAL EVERY MORNING
Qty: 30 CAPSULE | Refills: 11 | Status: SHIPPED | OUTPATIENT
Start: 2019-09-10 | End: 2019-10-25

## 2019-09-10 NOTE — PROGRESS NOTES
Chief Complaint:          Prostate cancer.    HPI:   84 y.o. male.  Pt had HiFu for prostate cancer and his psa increased from 4.2 to 5.6 on 6/2017 this was done on June.  He has had lupron prior to his HIFU.  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  With him being 82 years old he and I agreed that radiation therapy would likely cause decreased quality of life compared to hormonal therapy with lupron.  He has been on lupron since 2010.  Pt is on intermittent hormonal therapy.  His psa is 2.9.  His psa  In 1.9 a year ago and his psa went up to 5 in April of this year.  His psa has increased from 2.9 in September to 10.1 Now it has decreased to 4.4 in June 11  He had the lupron in march 2019  HPI  His psa was 4.0 on 9/5/19.    Past Medical History:        Past Medical History:   Diagnosis Date   • Atrial fibrillation (CMS/HCC)    • Coronary artery disease    • Hyperlipidemia    • Hypertension    • Prostate cancer (CMS/HCC)          Current Meds:     Current Outpatient Medications   Medication Sig Dispense Refill   • amLODIPine (NORVASC) 5 MG tablet Take 1 tablet by mouth Daily. 90 tablet 3   • apixaban (ELIQUIS) 5 MG tablet tablet Take 1 tablet by mouth Every 12 (Twelve) Hours. 28 tablet 0   • Bioflavonoid Products (SCOTT-C) 500-550 MG tablet Take 1 tablet by mouth Daily.     • Cholecalciferol (VITAMIN D3) 5000 UNITS capsule capsule Take 5,000 Units by mouth Daily.     • flecainide (TAMBOCOR) 50 MG tablet Take 25 mg by mouth 2 (Two) Times a Day.     • folic acid (FOLVITE) 800 MCG tablet Take 800 mcg by mouth daily.     • levothyroxine (SYNTHROID, LEVOTHROID) 75 MCG tablet Take 75 mcg by mouth Daily.  0   • lisinopril (PRINIVIL,ZESTRIL) 5 MG tablet Take 5 mg by mouth Daily.     • niacin (NIACIN SR,NIASPAN ER) 500 MG CR capsule Take 500 mg by mouth Every Night.     • triamterene-hydrochlorothiazide (MAXZIDE-25) 37.5-25 MG per tablet Take 1 tablet by mouth daily.        No current facility-administered medications for this visit.         Allergies:      No Known Allergies     Past Surgical History:     Past Surgical History:   Procedure Laterality Date   • CARDIAC CATHETERIZATION     • CARDIAC SURGERY      cardiac loop implant   • HERNIA REPAIR           Social History:     Social History     Socioeconomic History   • Marital status:      Spouse name: Not on file   • Number of children: Not on file   • Years of education: Not on file   • Highest education level: Not on file   Tobacco Use   • Smoking status: Never Smoker   • Smokeless tobacco: Never Used   Substance and Sexual Activity   • Alcohol use: No   • Drug use: No   • Sexual activity: Defer       Family History:     Family History   Problem Relation Age of Onset   • Kidney disease Mother    • Sudden death Other    • Cancer Other        Review of Systems:     Review of Systems   Constitutional: Negative for fatigue.   HENT: Negative for sinus pressure and sinus pain.    Eyes: Negative for pain and redness.   Respiratory: Negative for chest tightness.    Cardiovascular: Negative for chest pain.   Gastrointestinal: Negative for abdominal pain, constipation and diarrhea.   Endocrine: Negative for heat intolerance.   Genitourinary: Positive for frequency. Negative for dysuria and hematuria.   Musculoskeletal: Positive for back pain.   Skin: Negative for rash.   Allergic/Immunologic: Negative for food allergies.   Neurological: Negative for headaches.   Hematological: Bruises/bleeds easily.   Psychiatric/Behavioral: The patient is not nervous/anxious.              I have reviewed the follow portions of the patient's history and confirmed they are accurate today:  allergies, current medications, past family history, past medical history, past social history, past surgical history, problem list and ROS  Physical Exam:     Physical Exam   Constitutional: He is oriented to person, place, and time.   HENT:   Head:  "Normocephalic and atraumatic.   Right Ear: External ear normal.   Left Ear: External ear normal.   Nose: Nose normal.   Mouth/Throat: Oropharynx is clear and moist.   Eyes: Conjunctivae and EOM are normal. Pupils are equal, round, and reactive to light.   Neck: Normal range of motion. Neck supple. No thyromegaly present.   Cardiovascular: Normal rate, regular rhythm, normal heart sounds and intact distal pulses.   No murmur heard.  Pulmonary/Chest: Effort normal and breath sounds normal. No respiratory distress. He has no wheezes. He has no rales. He exhibits no tenderness.   Abdominal: Soft. Bowel sounds are normal. He exhibits no distension and no mass. There is no tenderness. No hernia.   Genitourinary: Rectum normal, prostate normal and penis normal.   Musculoskeletal: Normal range of motion. He exhibits no edema or tenderness.   Lymphadenopathy:     He has no cervical adenopathy.   Neurological: He is alert and oriented to person, place, and time. No cranial nerve deficit. He exhibits normal muscle tone. Coordination normal.   Skin: Skin is warm. No rash noted.   Psychiatric: He has a normal mood and affect. His behavior is normal. Judgment and thought content normal.   Nursing note and vitals reviewed.      Ht 180.3 cm (71\")   Wt 102 kg (224 lb)   BMI 31.24 kg/m²    Procedure:         Assessment:     Encounter Diagnoses   Name Primary?   • Prostate cancer (CMS/HCC) Yes   • Urgency incontinence        No orders of the defined types were placed in this encounter.      Plan:   Pt and I discussed hormonal resistance prostate cancer.    Will check and see pt back in 6 months.  Consider resuming lupron   Will try sanctura for urgency incontinence.  Pt his on meds for afib that interact with myrbetriq.    Patient's Body mass index is 31.24 kg/m². BMI is above normal parameters. Recommendations include: educational material.      Smoking Cessation Counseling:  Never a smoker.  Patient does not currently use any " tobacco products.     Counseling was given to patient for the following topics instructions for management as follows: prostate cancer. The interim medical history and current results were reviewed.  A treatment plan with follow-up was made for Prostate cancer (CMS/[facility]) [C61]. I spent 32 minutes face to face with Martín Campa and 80 percentage was spent in counseling.       This document has been electronically signed by Brett Talavera MD September 10, 2019 3:17 PM

## 2019-09-27 DIAGNOSIS — I48.0 PAROXYSMAL ATRIAL FIBRILLATION (HCC): Primary | ICD-10-CM

## 2019-09-27 RX ORDER — FLECAINIDE ACETATE 50 MG/1
25 TABLET ORAL 2 TIMES DAILY
Qty: 30 TABLET | Refills: 5 | Status: SHIPPED | OUTPATIENT
Start: 2019-09-27 | End: 2020-02-27 | Stop reason: DRUGHIGH

## 2019-10-18 ENCOUNTER — OFFICE VISIT (OUTPATIENT)
Dept: CARDIOLOGY | Facility: CLINIC | Age: 84
End: 2019-10-18

## 2019-10-18 DIAGNOSIS — R55 SYNCOPE, UNSPECIFIED SYNCOPE TYPE: Primary | ICD-10-CM

## 2019-10-18 DIAGNOSIS — I48.0 PAROXYSMAL ATRIAL FIBRILLATION (HCC): ICD-10-CM

## 2019-10-18 PROCEDURE — 93291 INTERROG DEV EVAL SCRMS IP: CPT | Performed by: INTERNAL MEDICINE

## 2019-10-18 NOTE — TELEPHONE ENCOUNTER
Patient given Eliquis 5 mg samples today, while in office for pacemaker check. 2 boxes given. Jackie Walters LPN

## 2019-10-25 ENCOUNTER — OFFICE VISIT (OUTPATIENT)
Dept: CARDIOLOGY | Facility: CLINIC | Age: 84
End: 2019-10-25

## 2019-10-25 VITALS
WEIGHT: 225 LBS | DIASTOLIC BLOOD PRESSURE: 80 MMHG | HEART RATE: 79 BPM | HEIGHT: 71 IN | BODY MASS INDEX: 31.5 KG/M2 | SYSTOLIC BLOOD PRESSURE: 120 MMHG

## 2019-10-25 DIAGNOSIS — I48.19 PERSISTENT ATRIAL FIBRILLATION (HCC): Primary | ICD-10-CM

## 2019-10-25 DIAGNOSIS — R55 SYNCOPE, UNSPECIFIED SYNCOPE TYPE: ICD-10-CM

## 2019-10-25 DIAGNOSIS — R00.1 BRADYCARDIA: ICD-10-CM

## 2019-10-25 PROCEDURE — 99214 OFFICE O/P EST MOD 30 MIN: CPT | Performed by: INTERNAL MEDICINE

## 2019-10-25 PROCEDURE — 93000 ELECTROCARDIOGRAM COMPLETE: CPT | Performed by: INTERNAL MEDICINE

## 2019-10-25 NOTE — PROGRESS NOTES
Martín Campa  1935  PCP: [place] MD    SUBJECTIVE:   Martín Campa is a 84 y.o. male seen for a follow up visit regarding the following:     Chief Complaint: Follow up for A. Fib, Tachy/Aidan    HPI:    Since last visit the patient's status has been stable. No Further syncope events. Overall A. Fib rates have been stable    History:       Cardiac PMH: (Old records have been reviewed and summarized below)  1. Atrial fibrillation with RVR during hospitalization, October 2014.                    A. Status post cardioversion, December 2014. EF 60-65%                    B. Had been initially started on amiodarone but stopped about one year ago (was on for 10 months) due to thyroid issues, on synthroid                    C. Residual A flutter/Afib, despite increased dosing of Flecainide now on 100 mg BID. No recent cardioversions                    D. CHADS-VASC= 3, On Eliquis 5mg BID. Recently decreased to 2.5 mg BID due to nose bleeds                    E. On Eliquis 5 mg BID, Flec 100 mg BID ECV in Sept 2016. Maintaining SR and not feeling any real difference in Afib vs SR                    F. Episode of Afib noted on Event Monitor with symptoms of LH noted with slow HRs      2. 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 Hypertension  4. Dyslipidemia      5. Prostate cancer          Current Outpatient Medications:   •  amLODIPine (NORVASC) 5 MG tablet, Take 1 tablet by mouth Daily., Disp: 90 tablet, Rfl: 3  •  apixaban (ELIQUIS) 5 MG tablet tablet, Take 1 tablet by mouth Every 12 (Twelve) Hours., Disp: 28 tablet, Rfl: 0  •  Bioflavonoid Products (SCOTT-C) 500-550 MG tablet, Take 1 tablet by mouth Daily., Disp: , Rfl:   •  Cholecalciferol (VITAMIN D3) 5000 UNITS capsule capsule, Take 5,000 Units by mouth Daily., Disp: , Rfl:   •  flecainide (TAMBOCOR) 50 MG tablet, Take 0.5 tablets by mouth 2 (Two) Times a Day for 180 days., Disp: 30 tablet, Rfl: 5  •  folic acid (FOLVITE) 800 MCG tablet, Take  "800 mcg by mouth daily., Disp: , Rfl:   •  levothyroxine (SYNTHROID, LEVOTHROID) 75 MCG tablet, Take 75 mcg by mouth Daily., Disp: , Rfl: 0  •  lisinopril (PRINIVIL,ZESTRIL) 5 MG tablet, Take 5 mg by mouth Daily., Disp: , Rfl:   •  niacin (NIACIN SR,NIASPAN ER) 500 MG CR capsule, Take 500 mg by mouth Every Night., Disp: , Rfl:   •  triamterene-hydrochlorothiazide (MAXZIDE-25) 37.5-25 MG per tablet, Take 1 tablet by mouth daily., Disp: , Rfl:     Past Medical History, Past Surgical History, Family history, Social History, and Medications were all reviewed with the patient today and updated as necessary.       Patient Active Problem List   Diagnosis   • Atrial fibrillation (CMS/HCC)   • Coronary arteriosclerosis in native artery   • Dyslipidemia   • Hypertension   • Impotence of organic origin   • Induratio penis plastica   • Malignant neoplasm of prostate (CMS/HCC)   • Atrial flutter (CMS/HCC)   • Prostate cancer (CMS/HCC)   • Syncope   • Bradycardia     No Known Allergies  Past Medical History:   Diagnosis Date   • Atrial fibrillation (CMS/HCC)    • Coronary artery disease    • Hyperlipidemia    • Hypertension    • Prostate cancer (CMS/HCC)      Past Surgical History:   Procedure Laterality Date   • CARDIAC CATHETERIZATION     • CARDIAC SURGERY      cardiac loop implant   • HERNIA REPAIR       Family History   Problem Relation Age of Onset   • Kidney disease Mother    • Sudden death Other    • Cancer Other      Social History     Tobacco Use   • Smoking status: Never Smoker   • Smokeless tobacco: Never Used   Substance Use Topics   • Alcohol use: No         PHYSICAL EXAM:    /80 (BP Location: Right arm, Patient Position: Sitting)   Pulse 79   Ht 180.3 cm (71\")   Wt 102 kg (225 lb)   BMI 31.38 kg/m²        Wt Readings from Last 5 Encounters:   10/25/19 102 kg (225 lb)   09/10/19 102 kg (224 lb)   08/22/19 102 kg (225 lb 12.8 oz)   06/11/19 101 kg (223 lb)   04/26/19 101 kg (223 lb)       BP Readings from " Last 5 Encounters:   10/25/19 120/80   08/22/19 141/70   04/26/19 112/70   04/24/19 142/71   02/21/19 112/60       General-Well Nourished, Well developed  Eyes - PERRLA  Neck- supple, No mass  CV- irregular rate and rhythm, no MRG, No edema  Lung- clear bilaterally  Abd- soft, +BS  Musc/skel - Norm strength and range of motion  Skin- warm and dry  Neuro - Alert & Oriented x 3, appropriate mood.        Medical problems and test results were reviewed with the patient today.     No results found for this or any previous visit (from the past 672 hour(s)).      EKG: (EKG has been independently visualized by me and summarized below)      ECG 12 Lead  Date/Time: 10/25/2019 2:05 PM  Performed by: Bryan Fuller MD  Authorized by: Bryan Fuller MD   Comparison: compared with previous ECG   Comparison to previous ECG: A. Fib has returned  Rhythm: atrial fibrillation  Rate: normal  BPM: 76  ST Segments: ST segments normal  T Waves: T waves normal    Clinical impression: abnormal EKG             ASSESSMENT and PLAN  1. Atrial Fibrillation - Persistent in nature - On Flecainide and Eliquis. No Symptoms, stable rates. When A. Fib progresses to [place] will stop Flecainide. Current [place] is 65%  2. Bradycardia - Has a loop recorder in place - Monitor for symptoms - May Pacemaker in the future  3. Syncope - Has a loop recorder to monitor - Placed Feb 2019  4. Essential hypertension - BP well-controlled  5. Flecainide use - Monitoring EKG      Return in about 6 months (around 4/25/2020) for office visit and device check with [place].        Bryan Fuller M.D., F.A.C.C, F.H.R.S.  Cardiology/Electrophysiology  10/25/2019  2:08 PM

## 2019-12-20 ENCOUNTER — OFFICE VISIT (OUTPATIENT)
Dept: CARDIOLOGY | Facility: CLINIC | Age: 84
End: 2019-12-20

## 2019-12-20 DIAGNOSIS — R55 SYNCOPE, UNSPECIFIED SYNCOPE TYPE: Primary | ICD-10-CM

## 2019-12-20 PROCEDURE — 93291 INTERROG DEV EVAL SCRMS IP: CPT | Performed by: INTERNAL MEDICINE

## 2020-02-27 ENCOUNTER — OFFICE VISIT (OUTPATIENT)
Dept: CARDIOLOGY | Facility: CLINIC | Age: 85
End: 2020-02-27

## 2020-02-27 VITALS
OXYGEN SATURATION: 98 % | HEART RATE: 77 BPM | HEIGHT: 71 IN | WEIGHT: 232.4 LBS | DIASTOLIC BLOOD PRESSURE: 62 MMHG | BODY MASS INDEX: 32.53 KG/M2 | SYSTOLIC BLOOD PRESSURE: 119 MMHG

## 2020-02-27 DIAGNOSIS — I48.0 PAROXYSMAL ATRIAL FIBRILLATION (HCC): Primary | ICD-10-CM

## 2020-02-27 DIAGNOSIS — I10 ESSENTIAL HYPERTENSION: ICD-10-CM

## 2020-02-27 DIAGNOSIS — R06.02 SHORTNESS OF BREATH: ICD-10-CM

## 2020-02-27 PROCEDURE — 99213 OFFICE O/P EST LOW 20 MIN: CPT | Performed by: PHYSICIAN ASSISTANT

## 2020-02-27 RX ORDER — FLECAINIDE ACETATE 50 MG/1
50 TABLET ORAL 2 TIMES DAILY
Qty: 180 TABLET | Refills: 3 | Status: SHIPPED | OUTPATIENT
Start: 2020-02-27 | End: 2020-05-27 | Stop reason: SDUPTHER

## 2020-02-27 NOTE — PATIENT INSTRUCTIONS

## 2020-02-27 NOTE — PROGRESS NOTES
Problem list     Subjective   Martín Campa is a 84 y.o. male     Chief Complaint   Patient presents with   • Follow-up     6 mth f/u   Problem List:  1. Atrial fibrillation with RVR during hospitalization, October 2014.  1.1 Status post cardioversion, December 2014.  1.2 Residual A flutter, despite increased dosing of Flecainide.  1.3 Repeat Cardioversion, 09/2016, with maintenance of SR with Flecainide.  2. 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 Hypertension  4. Dyslipidemia      5. Prostate cancer      6. First degree AVB    HPI  The patient presents in for routine evaluation and follow-up.  For the most part, the patient is continued to do well from cardiovascular standpoint since last evaluation here.  He really has no dysrhythmic symptoms on flecainide.  He is tolerating anticoagulation without complication.  He has no chest pain.  He has stable dyspnea.  He has stable fatigue.  The patient has no PND, orthopnea, or failure symptoms otherwise.  He does note stable lower extremity edema.  Blood pressures fluctuate but are largely controlled at home.  He has no further complaints otherwise and feels he is doing well from cardiovascular standpoint.    Current Outpatient Medications on File Prior to Visit   Medication Sig Dispense Refill   • amLODIPine (NORVASC) 5 MG tablet Take 1 tablet by mouth Daily. 90 tablet 3   • Bioflavonoid Products (SCOTT-C) 500-550 MG tablet Take 1 tablet by mouth Daily.     • Cholecalciferol (VITAMIN D3) 5000 UNITS capsule capsule Take 5,000 Units by mouth Daily.     • folic acid (FOLVITE) 800 MCG tablet Take 800 mcg by mouth daily.     • levothyroxine (SYNTHROID, LEVOTHROID) 75 MCG tablet Take 75 mcg by mouth Daily.  0   • lisinopril (PRINIVIL,ZESTRIL) 5 MG tablet Take 5 mg by mouth Daily.     • niacin (NIACIN SR,NIASPAN ER) 500 MG CR capsule Take 500 mg by mouth Every Night.     • triamterene-hydrochlorothiazide (MAXZIDE-25) 37.5-25 MG per tablet Take 1 tablet by mouth daily.      • [DISCONTINUED] apixaban (ELIQUIS) 5 MG tablet tablet Take 1 tablet by mouth Every 12 (Twelve) Hours. 28 tablet 0   • [DISCONTINUED] flecainide (TAMBOCOR) 50 MG tablet Take 0.5 tablets by mouth 2 (Two) Times a Day for 180 days. 30 tablet 5     No current facility-administered medications on file prior to visit.        Patient has no known allergies.    Past Medical History:   Diagnosis Date   • Atrial fibrillation (CMS/HCC)    • Coronary artery disease    • Hyperlipidemia    • Hypertension    • Prostate cancer (CMS/HCC)        Social History     Socioeconomic History   • Marital status:      Spouse name: Not on file   • Number of children: Not on file   • Years of education: Not on file   • Highest education level: Not on file   Tobacco Use   • Smoking status: Never Smoker   • Smokeless tobacco: Never Used   Substance and Sexual Activity   • Alcohol use: No   • Drug use: No   • Sexual activity: Defer       Family History   Problem Relation Age of Onset   • Kidney disease Mother    • Sudden death Other    • Cancer Other        Review of Systems   Constitutional: Positive for fatigue (easily fatigued).   HENT: Negative.  Negative for congestion, rhinorrhea and sore throat.    Eyes: Positive for visual disturbance (wears reading glasses).   Respiratory: Negative.  Negative for chest tightness, shortness of breath and wheezing.    Cardiovascular: Positive for leg swelling (BLE edema with pain). Negative for chest pain and palpitations.   Gastrointestinal: Negative.  Negative for abdominal pain, nausea and vomiting.   Endocrine: Positive for cold intolerance (always cold). Negative for heat intolerance.   Genitourinary: Positive for frequency (frequent urination). Negative for difficulty urinating and urgency.   Musculoskeletal: Positive for arthralgias (joints). Negative for back pain and neck pain.   Skin: Negative.  Negative for rash and wound.   Allergic/Immunologic: Negative.  Negative for environmental  "allergies and food allergies.   Neurological: Negative.  Negative for dizziness, syncope and light-headedness.   Hematological: Bruises/bleeds easily (bruises/blds easily).   Psychiatric/Behavioral: Negative.  Negative for agitation, confusion and sleep disturbance (denies waking up smothering/SOA). The patient is not nervous/anxious.        Objective   Vitals:    02/27/20 1405   BP: 119/62   BP Location: Left arm   Patient Position: Sitting   Pulse: 77   SpO2: 98%   Weight: 105 kg (232 lb 6.4 oz)   Height: 180.3 cm (71\")      /62 (BP Location: Left arm, Patient Position: Sitting)   Pulse 77   Ht 180.3 cm (71\")   Wt 105 kg (232 lb 6.4 oz)   SpO2 98%   BMI 32.41 kg/m²    Lab Results (most recent)     None        Physical Exam   Constitutional: He is oriented to person, place, and time. He appears well-developed and well-nourished. No distress.   HENT:   Head: Normocephalic and atraumatic.   Eyes: Pupils are equal, round, and reactive to light. Conjunctivae and EOM are normal.   Neck: Normal range of motion. Neck supple. No JVD present. No tracheal deviation present.   Cardiovascular: Normal rate, regular rhythm and intact distal pulses.   Murmur (Soft SHAD) heard.   Systolic murmur is present with a grade of 1/6.  Pulmonary/Chest: Effort normal and breath sounds normal.   Abdominal: Soft. Bowel sounds are normal. He exhibits no distension and no mass. There is no tenderness. There is no rebound and no guarding.   Musculoskeletal: Normal range of motion. He exhibits no edema, tenderness or deformity.   Neurological: He is alert and oriented to person, place, and time.   Skin: Skin is warm and dry. No rash noted. No erythema. No pallor.   Psychiatric: He has a normal mood and affect. His behavior is normal. Judgment and thought content normal.   Nursing note and vitals reviewed.        Procedure   Procedures       Assessment/Plan      Diagnosis Plan   1. Paroxysmal atrial fibrillation (CMS/HCC)  flecainide " (TAMBOCOR) 50 MG tablet    apixaban (ELIQUIS) 5 MG tablet tablet   2. Essential hypertension  flecainide (TAMBOCOR) 50 MG tablet    apixaban (ELIQUIS) 5 MG tablet tablet   3. Shortness of breath       1.  The patient appears to be well treated with regards to A. fib.  He is on flecainide and anticoagulation therapy.  Historically, implantable loop interrogations are followed through EP services.  We will continue to follow along in that regard.    2.  The patient reports normotensive status on current antihypertensive regimen.  He will continue those therapies without change.  He will monitor blood pressures at home and call us for any issues.    3.  Symptomatically, the patient appears to be doing well for the most part.  His dyspnea is at baseline.  He denies angina or failure otherwise.  For any complications, he will call immediately.  Otherwise, we will continue to see him on 6-month intervals.               Patient's Body mass index is 32.41 kg/m². BMI is above normal parameters. Recommendations include: educational material and referral to primary care.             Electronically signed by:

## 2020-03-02 ENCOUNTER — TELEPHONE (OUTPATIENT)
Dept: UROLOGY | Facility: CLINIC | Age: 85
End: 2020-03-02

## 2020-03-02 NOTE — TELEPHONE ENCOUNTER
Pt needs a lab order for psa sent to [facility] [place].  He will be there about 12 to have it done.

## 2020-03-04 ENCOUNTER — DOCUMENTATION (OUTPATIENT)
Dept: UROLOGY | Facility: CLINIC | Age: 85
End: 2020-03-04

## 2020-03-24 ENCOUNTER — OFFICE VISIT (OUTPATIENT)
Dept: UROLOGY | Facility: CLINIC | Age: 85
End: 2020-03-24

## 2020-03-24 VITALS — BODY MASS INDEX: 33.23 KG/M2 | TEMPERATURE: 98.2 F | HEIGHT: 71 IN | WEIGHT: 237.4 LBS

## 2020-03-24 DIAGNOSIS — C61 PROSTATE CANCER (HCC): Primary | ICD-10-CM

## 2020-03-24 PROCEDURE — 99214 OFFICE O/P EST MOD 30 MIN: CPT | Performed by: UROLOGY

## 2020-03-24 PROCEDURE — 96402 CHEMO HORMON ANTINEOPL SQ/IM: CPT | Performed by: UROLOGY

## 2020-03-24 RX ORDER — BICALUTAMIDE 50 MG/1
50 TABLET, FILM COATED ORAL DAILY
Qty: 30 TABLET | Refills: 11 | Status: SHIPPED | OUTPATIENT
Start: 2020-03-24 | End: 2021-10-15

## 2020-03-24 NOTE — PROGRESS NOTES
Chief Complaint:          Prostate cancer.    HPI:   84 y.o. male.  Pt's last lupron injection was a year ago in 3/2019. His psa 3 months later was 4.4.  Now his psa without lupron for a year is 15.9 3/2/2020.  Pt had HiFu for prostate cancer and his psa increased from 4.2 to 5.6 on 6/2017 this was done on June.  He has had lupron prior to his HIFU.  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  With him being 82 years old he and I agreed that radiation therapy would likely cause decreased quality of life compared to hormonal therapy with lupron.  He has been on lupron since 2010.  Pt is on intermittent hormonal therapy.  His psa is 2.9.  His psa  In 1.9 a year ago and his psa went up to 5 in April of this year.  His psa has increased from 2.9 in September to 10.1 Now it has decreased to 4.4 in June 11  He had the lupron in march 2019  HPI  His psa was 4.0 on 9/5/19.  HPI      Past Medical History:        Past Medical History:   Diagnosis Date   • Atrial fibrillation (CMS/HCC)    • Coronary artery disease    • Hyperlipidemia    • Hypertension    • Prostate cancer (CMS/HCC)          Current Meds:     Current Outpatient Medications   Medication Sig Dispense Refill   • amLODIPine (NORVASC) 5 MG tablet Take 1 tablet by mouth Daily. 90 tablet 3   • apixaban (ELIQUIS) 5 MG tablet tablet Take 1 tablet by mouth Every 12 (Twelve) Hours. 60 tablet 3   • Bioflavonoid Products (SCOTT-C) 500-550 MG tablet Take 1 tablet by mouth Daily.     • Cholecalciferol (VITAMIN D3) 5000 UNITS capsule capsule Take 5,000 Units by mouth Daily.     • flecainide (TAMBOCOR) 50 MG tablet Take 1 tablet by mouth 2 (Two) Times a Day. 180 tablet 3   • folic acid (FOLVITE) 800 MCG tablet Take 800 mcg by mouth daily.     • levothyroxine (SYNTHROID, LEVOTHROID) 75 MCG tablet Take 75 mcg by mouth Daily.  0   • lisinopril (PRINIVIL,ZESTRIL) 5 MG tablet Take 5 mg by mouth Daily.     • niacin (NIACIN SR,NIASPAN  ER) 500 MG CR capsule Take 500 mg by mouth Every Night.     • triamterene-hydrochlorothiazide (MAXZIDE-25) 37.5-25 MG per tablet Take 1 tablet by mouth daily.       No current facility-administered medications for this visit.         Allergies:      No Known Allergies     Past Surgical History:     Past Surgical History:   Procedure Laterality Date   • CARDIAC CATHETERIZATION     • CARDIAC SURGERY      cardiac loop implant   • HERNIA REPAIR           Social History:     Social History     Socioeconomic History   • Marital status:      Spouse name: Not on file   • Number of children: Not on file   • Years of education: Not on file   • Highest education level: Not on file   Tobacco Use   • Smoking status: Never Smoker   • Smokeless tobacco: Never Used   Substance and Sexual Activity   • Alcohol use: No   • Drug use: No   • Sexual activity: Defer       Family History:     Family History   Problem Relation Age of Onset   • Kidney disease Mother    • Sudden death Other    • Cancer Other        Review of Systems:     Review of Systems   Constitutional: Negative for chills, fatigue and fever.   HENT: Negative for congestion and sinus pressure.    Respiratory: Negative for chest tightness and shortness of breath.    Cardiovascular: Negative for chest pain.   Gastrointestinal: Negative for abdominal pain, constipation, diarrhea, nausea and vomiting.   Genitourinary: Negative for flank pain, frequency, hematuria and urgency.   Musculoskeletal: Negative for back pain and neck pain.   Skin: Negative for rash.   Neurological: Negative for dizziness and headaches.   Hematological: Does not bruise/bleed easily.   Psychiatric/Behavioral: The patient is not nervous/anxious.            I have reviewed the follow portions of the patient's history and confirmed they are accurate today:  allergies, current medications, past family history, past medical history, past social history, past surgical history, problem list and  "ROS  Physical Exam:     Physical Exam   Constitutional: He is oriented to person, place, and time.   HENT:   Head: Normocephalic and atraumatic.   Right Ear: External ear normal.   Left Ear: External ear normal.   Nose: Nose normal.   Mouth/Throat: Oropharynx is clear and moist.   Eyes: Pupils are equal, round, and reactive to light. Conjunctivae and EOM are normal.   Neck: Normal range of motion. Neck supple. No thyromegaly present.   Cardiovascular: Normal rate, regular rhythm, normal heart sounds and intact distal pulses.   No murmur heard.  Pulmonary/Chest: Effort normal and breath sounds normal. No respiratory distress. He has no wheezes. He has no rales. He exhibits no tenderness.   Abdominal: Soft. Bowel sounds are normal. He exhibits no distension and no mass. There is no tenderness. No hernia.   Genitourinary: Rectum normal and penis normal.   Genitourinary Comments: Flat.   Musculoskeletal: Normal range of motion. He exhibits no edema or tenderness.   Lymphadenopathy:     He has no cervical adenopathy.   Neurological: He is alert and oriented to person, place, and time. No cranial nerve deficit. He exhibits normal muscle tone. Coordination normal.   Skin: Skin is warm. No rash noted.   Psychiatric: He has a normal mood and affect. His behavior is normal. Judgment and thought content normal.   Nursing note and vitals reviewed.      Ht 180.3 cm (71\")   BMI 32.41 kg/m²    Procedure:         Assessment:   No diagnosis found.    No orders of the defined types were placed in this encounter.      Patient reports that he is not currently experiencing any symptoms of urinary incontinence.      Plan:   Will resume lupron and add casodex.  Will order bone scan to restage.  Will see pt back in 3 months with psa.      Patient's Body mass index is 32.41 kg/m². BMI is above normal parameters. Recommendations include: referral to primary care.      Smoking Cessation Counseling:  Never a smoker.  Patient does not " currently use any tobacco products.     Counseling was given to patient for the following topics diagnostic results including: psa. The interim medical history and current results were reviewed.  A treatment plan with follow-up was made for No primary diagnosis found.. I spent 33 minutes face to face with Martín Campa and 80 percentage was spent in counseling.       This document has been electronically signed by Brett Talavera MD March 24, 2020 10:18

## 2020-03-26 ENCOUNTER — TELEPHONE (OUTPATIENT)
Dept: CARDIOLOGY | Facility: CLINIC | Age: 85
End: 2020-03-26

## 2020-03-26 NOTE — TELEPHONE ENCOUNTER
Informed pt of the message from Sha, he verbalized understanding. Advised pt to contact prescribing provider if he has issues on meds, pt verbalized understanding.

## 2020-03-26 NOTE — TELEPHONE ENCOUNTER
"----- Message from JESS Henderson sent at 3/26/2020  2:38 PM EDT -----  I do not see any significant contraindication.  ----- Message -----  From: Sonia Hercules  Sent: 3/26/2020   1:23 PM EDT  To: JESS Henderson    Pt LVM stating that he needs to know if it's okay to take his new urology med with is cardiac meds.   #[phone]    Per chart review, Urology note states:   \"Will resume lupron and add casodex.  Will order bone scan to restage.  Will see pt back in 3 months with psa.\"         " DANGELO Caldwell/MONICA Patel  Pt. to go to ER.  Called report to ER staff.

## 2020-04-30 DIAGNOSIS — I10 ESSENTIAL HYPERTENSION: ICD-10-CM

## 2020-04-30 DIAGNOSIS — I48.0 PAROXYSMAL ATRIAL FIBRILLATION (HCC): ICD-10-CM

## 2020-05-27 DIAGNOSIS — I48.0 PAROXYSMAL ATRIAL FIBRILLATION (HCC): ICD-10-CM

## 2020-05-27 DIAGNOSIS — I10 ESSENTIAL HYPERTENSION: ICD-10-CM

## 2020-05-27 RX ORDER — FLECAINIDE ACETATE 50 MG/1
50 TABLET ORAL 2 TIMES DAILY
Qty: 180 TABLET | Refills: 3 | Status: SHIPPED | OUTPATIENT
Start: 2020-05-27 | End: 2020-07-17

## 2020-06-01 DIAGNOSIS — I10 ESSENTIAL HYPERTENSION: ICD-10-CM

## 2020-06-01 DIAGNOSIS — I48.0 PAROXYSMAL ATRIAL FIBRILLATION (HCC): ICD-10-CM

## 2020-06-01 RX ORDER — AMLODIPINE BESYLATE 5 MG/1
5 TABLET ORAL DAILY
Qty: 90 TABLET | Refills: 3 | Status: SHIPPED | OUTPATIENT
Start: 2020-06-01 | End: 2021-06-04 | Stop reason: SDUPTHER

## 2020-06-02 DIAGNOSIS — I10 ESSENTIAL HYPERTENSION: ICD-10-CM

## 2020-06-02 RX ORDER — AMLODIPINE BESYLATE 5 MG/1
TABLET ORAL
Qty: 90 TABLET | Refills: 3 | OUTPATIENT
Start: 2020-06-02

## 2020-06-11 ENCOUNTER — HOSPITAL ENCOUNTER (OUTPATIENT)
Dept: NUCLEAR MEDICINE | Facility: HOSPITAL | Age: 85
Discharge: HOME | End: 2020-06-11

## 2020-06-11 DIAGNOSIS — C61 PROSTATE CANCER (HCC): ICD-10-CM

## 2020-06-11 PROCEDURE — 78306 BONE IMAGING WHOLE BODY: CPT | Performed by: RADIOLOGY

## 2020-06-11 PROCEDURE — 78306 BONE IMAGING WHOLE BODY: CPT

## 2020-06-11 PROCEDURE — 0 TECHNETIUM OXIDRONATE KIT: Performed by: UROLOGY

## 2020-06-11 PROCEDURE — A9561 TC99M OXIDRONATE: HCPCS | Performed by: UROLOGY

## 2020-06-11 RX ADMIN — TECHNETIUM TC 99M OXIDRONATE 1 DOSE: 3.15 INJECTION, POWDER, LYOPHILIZED, FOR SOLUTION INTRAVENOUS at 09:26

## 2020-06-12 ENCOUNTER — TELEPHONE (OUTPATIENT)
Dept: UROLOGY | Facility: CLINIC | Age: 85
End: 2020-06-12

## 2020-06-12 NOTE — TELEPHONE ENCOUNTER
Notified pt that Dr Talavera has not reviewed his Bone scan yet and that we can get him in earlier then the follow up appt on the 23 rd to review the scan and have him get a PSA prior to that appt - pt voiced understanding and was transferred to [facility].

## 2020-06-16 ENCOUNTER — OFFICE VISIT (OUTPATIENT)
Dept: UROLOGY | Facility: CLINIC | Age: 85
End: 2020-06-16

## 2020-06-16 VITALS — WEIGHT: 234 LBS | HEIGHT: 71 IN | BODY MASS INDEX: 32.76 KG/M2 | TEMPERATURE: 97.6 F

## 2020-06-16 DIAGNOSIS — C61 PROSTATE CANCER METASTATIC TO BONE (HCC): ICD-10-CM

## 2020-06-16 DIAGNOSIS — N13.39 OTHER HYDRONEPHROSIS: ICD-10-CM

## 2020-06-16 DIAGNOSIS — C61 PROSTATE CANCER (HCC): Primary | ICD-10-CM

## 2020-06-16 DIAGNOSIS — C79.51 PROSTATE CANCER METASTATIC TO BONE (HCC): ICD-10-CM

## 2020-06-16 PROCEDURE — 99214 OFFICE O/P EST MOD 30 MIN: CPT | Performed by: UROLOGY

## 2020-06-16 NOTE — PROGRESS NOTES
Chief Complaint:          Prostate cancer     HPI:   84 y.o. male.  Pt's psa has declined from 15.9 to 4.3 after lupron.  His bone scan showed some left hydronephroisi and he had some areas of uptake in ribs that either was old trauma or metastatic diseas.  HPI      Past Medical History:        Past Medical History:   Diagnosis Date   • Atrial fibrillation (CMS/HCC)    • Coronary artery disease    • Hyperlipidemia    • Hypertension    • Prostate cancer (CMS/HCC)          Current Meds:     Current Outpatient Medications   Medication Sig Dispense Refill   • amLODIPine (NORVASC) 5 MG tablet Take 1 tablet by mouth Daily. 90 tablet 3   • apixaban (ELIQUIS) 5 MG tablet tablet Take 1 tablet by mouth Every 12 (Twelve) Hours. 60 tablet 5   • bicalutamide (CASODEX) 50 MG chemo tablet Take 1 tablet by mouth Daily. 30 tablet 11   • Bioflavonoid Products (SCOTT-C) 500-550 MG tablet Take 1 tablet by mouth Daily.     • Cholecalciferol (VITAMIN D3) 5000 UNITS capsule capsule Take 5,000 Units by mouth Daily.     • flecainide (TAMBOCOR) 50 MG tablet Take 1 tablet by mouth 2 (Two) Times a Day. 180 tablet 3   • folic acid (FOLVITE) 800 MCG tablet Take 800 mcg by mouth daily.     • levothyroxine (SYNTHROID, LEVOTHROID) 75 MCG tablet Take 75 mcg by mouth Daily.  0   • lisinopril (PRINIVIL,ZESTRIL) 5 MG tablet Take 5 mg by mouth Daily.     • niacin (NIACIN SR,NIASPAN ER) 500 MG CR capsule Take 500 mg by mouth Every Night.     • triamterene-hydrochlorothiazide (MAXZIDE-25) 37.5-25 MG per tablet Take 1 tablet by mouth daily.       No current facility-administered medications for this visit.         Allergies:      No Known Allergies     Past Surgical History:     Past Surgical History:   Procedure Laterality Date   • CARDIAC CATHETERIZATION     • CARDIAC SURGERY      cardiac loop implant   • HERNIA REPAIR           Social History:     Social History     Socioeconomic History   • Marital status:      Spouse name: Not on file   •  Number of children: Not on file   • Years of education: Not on file   • Highest education level: Not on file   Tobacco Use   • Smoking status: Never Smoker   • Smokeless tobacco: Never Used   Substance and Sexual Activity   • Alcohol use: No   • Drug use: No   • Sexual activity: Defer       Family History:     Family History   Problem Relation Age of Onset   • Kidney disease Mother    • Sudden death Other    • Cancer Other        Review of Systems:     Review of Systems   Constitutional: Negative for chills, fatigue and fever.   HENT: Negative for congestion and sinus pressure.    Respiratory: Negative for chest tightness and shortness of breath.    Cardiovascular: Negative for chest pain.   Gastrointestinal: Negative for abdominal pain, constipation, diarrhea, nausea and vomiting.   Genitourinary: Negative for flank pain, frequency, hematuria and urgency.   Musculoskeletal: Positive for joint swelling. Negative for back pain and neck pain.   Skin: Negative for rash.   Neurological: Negative for dizziness and headaches.   Hematological: Bruises/bleeds easily.   Psychiatric/Behavioral: The patient is not nervous/anxious.        IPSS Questionnaire (AUA-7):  Over the past month…    1)  Incomplete Emptying  How often have you had a sensation of not emptying your bladder?  0 - Not at all   2)  Frequency  How often have you had to urinate less than every two hours? 1 - Less than 1 time in 5   3)  Intermittency  How often have you found you stopped and started again several times when you urinated?  0 - Not at all   4) Urgency  How often have you found it difficult to postpone urination?  0 - Not at all   5) Weak Stream  How often have you had a weak urinary stream?  0 - Not at all   6) Straining  How often have you had to push or strain to begin urination?  0 - Not at all   7) Nocturia  How many times did you typically get up at night to urinate?  2 - 2 times   Total Score:  3       Quality of life due to urinary  "symptoms:  If you were to spend the rest of your life with your urinary condition the way it is now, how would you feel about that? 2-Mostly Satisfied   Urine Leakage (Incontinence) 0-No Leakage       I have reviewed the follow portions of the patient's history and confirmed they are accurate today:  allergies, current medications, past family history, past medical history, past social history, past surgical history, problem list and ROS  Physical Exam:     Physical Exam    Temp 97.6 °F (36.4 °C)   Ht 180.3 cm (71\")   Wt 106 kg (234 lb)   BMI 32.64 kg/m²    Procedure:         Assessment:     Encounter Diagnoses   Name Primary?   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     Answer:   No       Patient reports that he is not currently experiencing any symptoms of urinary incontinence.      Plan:   I would recommend ct scan with and without contrast regarding metastatic disease and hydronephrosis and consider cystoscopy bilateral retrograde pyelography.  Will get rib detail films.  Will see pt back in 2 weeks.    Patient's Body mass index is 32.64 kg/m². BMI is within normal parameters. No follow-up required..      Smoking Cessation Counseling:  Former smoker.  Patient does not currently use any tobacco products.     Counseling was given to patient for the following topics diagnostic results including: bone scan and psa. The interim medical history and current results were reviewed.  A treatment plan with follow-up was made for Prostate cancer (CMS/HCC) [C61]. I spent 28 minutes face to face with Martín Campa and 80 percentage was " spent in counseling.       This document has been electronically signed by Brett Talavera MD June 16, 2020 11:51

## 2020-06-24 ENCOUNTER — HOSPITAL ENCOUNTER (OUTPATIENT)
Dept: CT IMAGING | Facility: HOSPITAL | Age: 85
Discharge: HOME | End: 2020-06-24
Admitting: UROLOGY

## 2020-06-24 DIAGNOSIS — N13.39 OTHER HYDRONEPHROSIS: ICD-10-CM

## 2020-06-24 DIAGNOSIS — C79.51 PROSTATE CANCER METASTATIC TO BONE (HCC): ICD-10-CM

## 2020-06-24 DIAGNOSIS — C61 PROSTATE CANCER METASTATIC TO BONE (HCC): ICD-10-CM

## 2020-06-24 LAB — CREAT BLDA-MCNC: 1.2 MG/DL (ref 0.6–1.3)

## 2020-06-24 PROCEDURE — 82565 ASSAY OF CREATININE: CPT

## 2020-06-24 PROCEDURE — 0 IOVERSOL 68 % SOLUTION: Performed by: UROLOGY

## 2020-06-24 PROCEDURE — 74178 CT ABD&PLV WO CNTR FLWD CNTR: CPT | Performed by: RADIOLOGY

## 2020-06-24 PROCEDURE — 74178 CT ABD&PLV WO CNTR FLWD CNTR: CPT

## 2020-06-24 RX ADMIN — IOVERSOL 100 ML: 678 INJECTION INTRA-ARTERIAL; INTRAVENOUS at 13:53

## 2020-06-30 ENCOUNTER — HOSPITAL ENCOUNTER (OUTPATIENT)
Facility: HOSPITAL | Age: 85
Setting detail: HOSPITAL OUTPATIENT SURGERY
End: 2020-06-30
Attending: UROLOGY | Admitting: UROLOGY

## 2020-06-30 ENCOUNTER — OFFICE VISIT (OUTPATIENT)
Dept: UROLOGY | Facility: CLINIC | Age: 85
End: 2020-06-30

## 2020-06-30 VITALS — WEIGHT: 234 LBS | BODY MASS INDEX: 32.76 KG/M2 | TEMPERATURE: 97.8 F | HEIGHT: 71 IN

## 2020-06-30 DIAGNOSIS — C61 PROSTATE CANCER (HCC): Primary | ICD-10-CM

## 2020-06-30 DIAGNOSIS — R33.9 INCOMPLETE EMPTYING OF BLADDER: ICD-10-CM

## 2020-06-30 DIAGNOSIS — N13.30 HYDRONEPHROSIS, UNSPECIFIED HYDRONEPHROSIS TYPE: ICD-10-CM

## 2020-06-30 PROCEDURE — 51798 US URINE CAPACITY MEASURE: CPT | Performed by: UROLOGY

## 2020-06-30 PROCEDURE — 99215 OFFICE O/P EST HI 40 MIN: CPT | Performed by: UROLOGY

## 2020-06-30 PROCEDURE — 85027 COMPLETE CBC AUTOMATED: CPT | Performed by: UROLOGY

## 2020-06-30 PROCEDURE — 80053 COMPREHEN METABOLIC PANEL: CPT | Performed by: UROLOGY

## 2020-06-30 RX ORDER — CEFAZOLIN SODIUM 2 G/50ML
2 SOLUTION INTRAVENOUS ONCE
Status: CANCELLED | OUTPATIENT
Start: 2020-06-30 | End: 2020-06-30

## 2020-06-30 NOTE — PROGRESS NOTES
Chief Complaint:          Prostate Cancer    HPI:   85 y.o. male.  Pt denies difficulty voiding.  He has urinary frequency and at night urinary urgency.  His ct scan shows a right solid mass adjacent to iliac vessels and bladder.  His bladder was distended and thinned out on ct scan and he has left hydronephrosis.  HPI  Pt has had HIFU and a turp 9 years ago.    Post void bladder scan today showed a large residual greater than 999 cc.  Past Medical History:        Past Medical History:   Diagnosis Date   • Atrial fibrillation (CMS/HCC)    • Coronary artery disease    • Hyperlipidemia    • Hypertension    • Prostate cancer (CMS/HCC)          Current Meds:     Current Outpatient Medications   Medication Sig Dispense Refill   • amLODIPine (NORVASC) 5 MG tablet Take 1 tablet by mouth Daily. 90 tablet 3   • apixaban (ELIQUIS) 5 MG tablet tablet Take 1 tablet by mouth Every 12 (Twelve) Hours. 60 tablet 5   • bicalutamide (CASODEX) 50 MG chemo tablet Take 1 tablet by mouth Daily. 30 tablet 11   • Bioflavonoid Products (SCOTT-C) 500-550 MG tablet Take 1 tablet by mouth Daily.     • Cholecalciferol (VITAMIN D3) 5000 UNITS capsule capsule Take 5,000 Units by mouth Daily.     • flecainide (TAMBOCOR) 50 MG tablet Take 1 tablet by mouth 2 (Two) Times a Day. 180 tablet 3   • folic acid (FOLVITE) 800 MCG tablet Take 800 mcg by mouth daily.     • levothyroxine (SYNTHROID, LEVOTHROID) 75 MCG tablet Take 75 mcg by mouth Daily.  0   • lisinopril (PRINIVIL,ZESTRIL) 5 MG tablet Take 5 mg by mouth Daily.     • niacin (NIACIN SR,NIASPAN ER) 500 MG CR capsule Take 500 mg by mouth Every Night.     • triamterene-hydrochlorothiazide (MAXZIDE-25) 37.5-25 MG per tablet Take 1 tablet by mouth daily.       No current facility-administered medications for this visit.         Allergies:      No Known Allergies     Past Surgical History:     Past Surgical History:   Procedure Laterality Date   • CARDIAC CATHETERIZATION     • CARDIAC SURGERY       cardiac loop implant   • HERNIA REPAIR           Social History:     Social History     Socioeconomic History   • Marital status:      Spouse name: Not on file   • Number of children: Not on file   • Years of education: Not on file   • Highest education level: Not on file   Tobacco Use   • Smoking status: Never Smoker   • Smokeless tobacco: Never Used   Substance and Sexual Activity   • Alcohol use: No   • Drug use: No   • Sexual activity: Defer       Family History:     Family History   Problem Relation Age of Onset   • Kidney disease Mother    • Sudden death Other    • Cancer Other        Review of Systems:     Review of Systems   Constitutional: Negative.    HENT: Negative.    Eyes: Negative.    Respiratory: Negative.    Cardiovascular: Negative.    Gastrointestinal: Negative.    Endocrine: Negative.    Genitourinary: Positive for frequency and urgency. Negative for decreased urine volume, difficulty urinating, discharge, dysuria, enuresis, flank pain, genital sores, hematuria, penile pain, penile swelling, scrotal swelling and testicular pain.   Musculoskeletal: Negative.    Skin: Negative.    Allergic/Immunologic: Negative.    Neurological: Negative.    Hematological: Negative.    Psychiatric/Behavioral: Negative.          I have reviewed the follow portions of the patient's history and confirmed they are accurate today:  allergies, current medications, past family history, past medical history, past social history, past surgical history, problem list and ROS  Physical Exam:     Physical Exam   Constitutional: He is oriented to person, place, and time.   HENT:   Head: Normocephalic and atraumatic.   Right Ear: External ear normal.   Left Ear: External ear normal.   Nose: Nose normal.   Mouth/Throat: Oropharynx is clear and moist.   Eyes: Pupils are equal, round, and reactive to light. Conjunctivae and EOM are normal.   Neck: Normal range of motion. Neck supple. No thyromegaly present.   Cardiovascular:  "Normal rate, regular rhythm, normal heart sounds and intact distal pulses.   No murmur heard.  Pulmonary/Chest: Effort normal and breath sounds normal. No respiratory distress. He has no wheezes. He has no rales. He exhibits no tenderness.   Abdominal: Soft. Bowel sounds are normal. He exhibits no distension and no mass. There is no tenderness. No hernia.   Genitourinary: Rectum normal and penis normal.   Genitourinary Comments: Flat prostate.   Musculoskeletal: Normal range of motion. He exhibits no edema or tenderness.   Lymphadenopathy:     He has no cervical adenopathy.   Neurological: He is alert and oriented to person, place, and time. No cranial nerve deficit. He exhibits normal muscle tone. Coordination normal.   Skin: Skin is warm. No rash noted.   Psychiatric: He has a normal mood and affect. His behavior is normal. Judgment and thought content normal.   Nursing note and vitals reviewed.      Temp 97.8 °F (36.6 °C)   Ht 180.3 cm (70.98\")   Wt 106 kg (234 lb)   BMI 32.65 kg/m²    Procedure:         Assessment:     Encounter Diagnoses   Name Primary?   • Incomplete emptying of bladder    • Prostate cancer (CMS/HCC) Yes   • Hydronephrosis, unspecified hydronephrosis type        Orders Placed This Encounter   Procedures   • Bladder Scan       Patient reports that he is not currently experiencing any symptoms of urinary incontinence.      Plan:   Pt has over flow incontinence and a distended bladder and chronic hydronephrosis.  I would recommend cystoscopy bilateral retrograde pyelography and possible turp and left stent placement.  Pt will need cardiac clearance.  Pt is Zoroastrianism and he refuses blood products on a religous basis.    Patient's Body mass index is 32.65 kg/m². BMI is within normal parameters. No follow-up required..      Smoking Cessation Counseling:  Never a smoker.  Patient does not currently use any tobacco products.     Counseling was given to patient for the following topics " instructions for management as follows: hydronephrosis and chronic retention. The interim medical history and current results were reviewed.  A treatment plan with follow-up was made for Prostate cancer (CMS/formerly [facility]) [C61]. I spent 41 minutes face to face with Martín Campa and 80 percentage was spent in counseling.       This document has been electronically signed by Brett Talavera MD June 30, 2020 11:21

## 2020-07-01 LAB
ALBUMIN SERPL-MCNC: 4.1 G/DL (ref 3.5–5.2)
ALBUMIN/GLOB SERPL: 1.4 G/DL
ALP SERPL-CCNC: 72 U/L (ref 39–117)
ALT SERPL W P-5'-P-CCNC: 12 U/L (ref 1–41)
ANION GAP SERPL CALCULATED.3IONS-SCNC: 10 MMOL/L (ref 5–15)
AST SERPL-CCNC: 15 U/L (ref 1–40)
BILIRUB SERPL-MCNC: 0.8 MG/DL (ref 0.2–1.2)
BUN SERPL-MCNC: 28 MG/DL (ref 8–23)
BUN/CREAT SERPL: 24.8 (ref 7–25)
CALCIUM SPEC-SCNC: 9.8 MG/DL (ref 8.6–10.5)
CHLORIDE SERPL-SCNC: 89 MMOL/L (ref 98–107)
CO2 SERPL-SCNC: 27 MMOL/L (ref 22–29)
CREAT SERPL-MCNC: 1.13 MG/DL (ref 0.76–1.27)
DEPRECATED RDW RBC AUTO: 46.1 FL (ref 37–54)
ERYTHROCYTE [DISTWIDTH] IN BLOOD BY AUTOMATED COUNT: 13.7 % (ref 12.3–15.4)
GFR SERPL CREATININE-BSD FRML MDRD: 62 ML/MIN/1.73
GLOBULIN UR ELPH-MCNC: 3 GM/DL
GLUCOSE SERPL-MCNC: 96 MG/DL (ref 65–99)
HCT VFR BLD AUTO: 39.8 % (ref 37.5–51)
HGB BLD-MCNC: 13.7 G/DL (ref 13–17.7)
MCH RBC QN AUTO: 31.5 PG (ref 26.6–33)
MCHC RBC AUTO-ENTMCNC: 34.4 G/DL (ref 31.5–35.7)
MCV RBC AUTO: 91.5 FL (ref 79–97)
PLATELET # BLD AUTO: 225 10*3/MM3 (ref 140–450)
PMV BLD AUTO: 11.9 FL (ref 6–12)
POTASSIUM SERPL-SCNC: 4.1 MMOL/L (ref 3.5–5.2)
PROT SERPL-MCNC: 7.1 G/DL (ref 6–8.5)
RBC # BLD AUTO: 4.35 10*6/MM3 (ref 4.14–5.8)
SODIUM SERPL-SCNC: 126 MMOL/L (ref 136–145)
WBC # BLD AUTO: 10.42 10*3/MM3 (ref 3.4–10.8)

## 2020-07-06 ENCOUNTER — TELEPHONE (OUTPATIENT)
Dept: UROLOGY | Facility: CLINIC | Age: 85
End: 2020-07-06

## 2020-07-06 NOTE — TELEPHONE ENCOUNTER
PT CALLED WANTS TO CANCEL HIS SURGERY PT HAS AN APPT WITH CARDIOLOGIST THIS WEEK AND PT DOESN'T WANT TO HAVE SURGERY WITH THE COVID GOING ON DOESN'T WANT TO BE IN THE HOSPITAL

## 2020-07-07 ENCOUNTER — APPOINTMENT (OUTPATIENT)
Dept: PREADMISSION TESTING | Facility: HOSPITAL | Age: 85
End: 2020-07-07

## 2020-07-17 ENCOUNTER — OFFICE VISIT (OUTPATIENT)
Dept: CARDIOLOGY | Facility: CLINIC | Age: 85
End: 2020-07-17

## 2020-07-17 VITALS
BODY MASS INDEX: 31.78 KG/M2 | DIASTOLIC BLOOD PRESSURE: 60 MMHG | HEART RATE: 75 BPM | HEIGHT: 70 IN | SYSTOLIC BLOOD PRESSURE: 118 MMHG | WEIGHT: 222 LBS

## 2020-07-17 DIAGNOSIS — Z79.899 LONG TERM CURRENT USE OF ANTIARRHYTHMIC MEDICAL THERAPY: ICD-10-CM

## 2020-07-17 DIAGNOSIS — R00.1 BRADYCARDIA: ICD-10-CM

## 2020-07-17 DIAGNOSIS — I48.19 PERSISTENT ATRIAL FIBRILLATION (HCC): Primary | ICD-10-CM

## 2020-07-17 DIAGNOSIS — R55 SYNCOPE, UNSPECIFIED SYNCOPE TYPE: ICD-10-CM

## 2020-07-17 DIAGNOSIS — I10 ESSENTIAL HYPERTENSION: ICD-10-CM

## 2020-07-17 PROCEDURE — 99214 OFFICE O/P EST MOD 30 MIN: CPT | Performed by: INTERNAL MEDICINE

## 2020-07-17 PROCEDURE — 93291 INTERROG DEV EVAL SCRMS IP: CPT | Performed by: INTERNAL MEDICINE

## 2020-07-17 NOTE — PROGRESS NOTES
Martín Campa  1935  PCP: [place] MD    SUBJECTIVE:   Martín Campa is a 85 y.o. male seen for a follow up visit regarding the following:     Chief Complaint: Follow up for A. Fib, Tachy/Aidan    HPI:    Since last visit the patient's status has been stable. No Further syncope events. Overall A. Fib rates have been stable. No Tachy/Aidan events.     History:       Cardiac PMH: (Old records have been reviewed and summarized below)  1. Atrial fibrillation with RVR during hospitalization, October 2014.                    A. Status post cardioversion, December 2014. EF 60-65%                    B. Had been initially started on amiodarone but stopped about one year ago (was on for 10 months) due to thyroid issues, on synthroid                    C. Residual A flutter/Afib, despite increased dosing of Flecainide now on 100 mg BID. No recent cardioversions                    D. CHADS-VASC= 3, On Eliquis 5mg BID. Recently decreased to 2.5 mg BID due to nose bleeds                    E. On Eliquis 5 mg BID, Flec 100 mg BID ECV in Sept 2016. Maintaining SR and not feeling any real difference in Afib vs SR                    F. Episode of Afib noted on Event Monitor with symptoms of LH noted with slow HRs      2. 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 Hypertension  4. Dyslipidemia      5. Prostate cancer          Current Outpatient Medications:   •  amLODIPine (NORVASC) 5 MG tablet, Take 1 tablet by mouth Daily., Disp: 90 tablet, Rfl: 3  •  apixaban (ELIQUIS) 5 MG tablet tablet, Take 1 tablet by mouth Every 12 (Twelve) Hours., Disp: 60 tablet, Rfl: 5  •  bicalutamide (CASODEX) 50 MG chemo tablet, Take 1 tablet by mouth Daily., Disp: 30 tablet, Rfl: 11  •  Bioflavonoid Products (SCOTT-C) 500-550 MG tablet, Take 1 tablet by mouth Daily., Disp: , Rfl:   •  Cholecalciferol (VITAMIN D3) 5000 UNITS capsule capsule, Take 5,000 Units by mouth Daily., Disp: , Rfl:   •  folic acid (FOLVITE) 800 MCG tablet,  "Take 800 mcg by mouth daily., Disp: , Rfl:   •  levothyroxine (SYNTHROID, LEVOTHROID) 75 MCG tablet, Take 75 mcg by mouth Daily., Disp: , Rfl: 0  •  lisinopril (PRINIVIL,ZESTRIL) 5 MG tablet, Take 5 mg by mouth Daily., Disp: , Rfl:   •  niacin (NIACIN SR,NIASPAN ER) 500 MG CR capsule, Take 500 mg by mouth Every Night., Disp: , Rfl:   •  triamterene-hydrochlorothiazide (MAXZIDE-25) 37.5-25 MG per tablet, Take 1 tablet by mouth daily., Disp: , Rfl:     Past Medical History, Past Surgical History, Family history, Social History, and Medications were all reviewed with the patient today and updated as necessary.       Patient Active Problem List   Diagnosis   • Atrial fibrillation (CMS/HCC)   • Coronary arteriosclerosis in native artery   • Dyslipidemia   • Hypertension   • Impotence of organic origin   • Induratio penis plastica   • Malignant neoplasm of prostate (CMS/HCC)   • Atrial flutter (CMS/HCC)   • Prostate cancer (CMS/HCC)   • Syncope   • Bradycardia   • Hydronephrosis   • Long term current use of antiarrhythmic medical therapy     No Known Allergies  Past Medical History:   Diagnosis Date   • Atrial fibrillation (CMS/HCC)    • Coronary artery disease    • Hydronephrosis 6/30/2020   • Hyperlipidemia    • Hypertension    • Prostate cancer (CMS/HCC)      Past Surgical History:   Procedure Laterality Date   • CARDIAC CATHETERIZATION     • CARDIAC SURGERY      cardiac loop implant   • HERNIA REPAIR       Family History   Problem Relation Age of Onset   • Kidney disease Mother    • Sudden death Other    • Cancer Other      Social History     Tobacco Use   • Smoking status: Never Smoker   • Smokeless tobacco: Never Used   Substance Use Topics   • Alcohol use: No         PHYSICAL EXAM:    /60 (BP Location: Left arm, Patient Position: Sitting)   Pulse 75   Ht 177.8 cm (70\")   Wt 101 kg (222 lb)   BMI 31.85 kg/m²        Wt Readings from Last 5 Encounters:   07/17/20 101 kg (222 lb)   06/30/20 106 kg (234 lb) "   06/16/20 106 kg (234 lb)   03/24/20 108 kg (237 lb 6.4 oz)   02/27/20 105 kg (232 lb 6.4 oz)       BP Readings from Last 5 Encounters:   07/17/20 118/60   02/27/20 119/62   10/25/19 120/80   08/22/19 141/70   04/26/19 112/70       General-Well Nourished, Well developed  Eyes - PERRLA  Neck- supple, No mass  CV- irregular rate and rhythm, no MRG, No edema  Lung- clear bilaterally  Abd- soft, +BS  Musc/skel - Norm strength and range of motion  Skin- warm and dry  Neuro - Alert & Oriented x 3, appropriate mood.        Medical problems and test results were reviewed with the patient today.     Recent Results (from the past 672 hour(s))   POC Creatinine    Collection Time: 06/24/20  1:47 PM   Result Value Ref Range    Creatinine 1.20 0.60 - 1.30 mg/dL   Comprehensive Metabolic Panel    Collection Time: 06/30/20 11:47 AM   Result Value Ref Range    Glucose 96 65 - 99 mg/dL    BUN 28 (H) 8 - 23 mg/dL    Creatinine 1.13 0.76 - 1.27 mg/dL    Sodium 126 (L) 136 - 145 mmol/L    Potassium 4.1 3.5 - 5.2 mmol/L    Chloride 89 (L) 98 - 107 mmol/L    CO2 27.0 22.0 - 29.0 mmol/L    Calcium 9.8 8.6 - 10.5 mg/dL    Total Protein 7.1 6.0 - 8.5 g/dL    Albumin 4.10 3.50 - 5.20 g/dL    ALT (SGPT) 12 1 - 41 U/L    AST (SGOT) 15 1 - 40 U/L    Alkaline Phosphatase 72 39 - 117 U/L    Total Bilirubin 0.8 0.2 - 1.2 mg/dL    eGFR Non African Amer 62 >60 mL/min/1.73    Globulin 3.0 gm/dL    A/G Ratio 1.4 g/dL    BUN/Creatinine Ratio 24.8 7.0 - 25.0    Anion Gap 10.0 5.0 - 15.0 mmol/L   CBC (No Diff)    Collection Time: 06/30/20 11:47 AM   Result Value Ref Range    WBC 10.42 3.40 - 10.80 10*3/mm3    RBC 4.35 4.14 - 5.80 10*6/mm3    Hemoglobin 13.7 13.0 - 17.7 g/dL    Hematocrit 39.8 37.5 - 51.0 %    MCV 91.5 79.0 - 97.0 fL    MCH 31.5 26.6 - 33.0 pg    MCHC 34.4 31.5 - 35.7 g/dL    RDW 13.7 12.3 - 15.4 %    RDW-SD 46.1 37.0 - 54.0 fl    MPV 11.9 6.0 - 12.0 fL    Platelets 225 140 - 450 10*3/mm3         EKG: (EKG has been independently  visualized by me and summarized below)      ECG 12 Lead  Date/Time: 7/17/2020 10:25 AM  Performed by: Bryan Fuller MD  Authorized by: Bryan Fuller MD   Comparison: compared with previous ECG   Similar to previous ECG  Rhythm: atrial fibrillation  Rate: normal  BPM: 78  Other findings: non-specific ST-T wave changes    Clinical impression: abnormal EKG             ASSESSMENT and PLAN  1. Atrial Fibrillation - Persistent in nature - On Flecainide and Eliquis. No Symptoms, stable rates. A. Fib has progressed to [place] - will stop Flecainide. Current [place] is near 100%  2. Bradycardia - Has a loop recorder in place - Monitor for symptoms - May Pacemaker in the future  3. Syncope - Has a loop recorder to monitor - Placed Feb 2019 - No recurrent events.   4. Essential hypertension - BP well-controlled  5. Flecainide use - Monitoring EKG - Stopping today      Return in about 6 months (around 1/17/2021).        Bryan Fuller M.D., F.A.C.C, F.H.R.S.  Cardiology/Electrophysiology  7/17/2020  10:27

## 2020-08-06 ENCOUNTER — TELEPHONE (OUTPATIENT)
Dept: CARDIOLOGY | Facility: CLINIC | Age: 85
End: 2020-08-06

## 2020-08-10 DIAGNOSIS — I10 ESSENTIAL HYPERTENSION: ICD-10-CM

## 2020-08-10 DIAGNOSIS — I48.0 PAROXYSMAL ATRIAL FIBRILLATION (HCC): ICD-10-CM

## 2020-08-27 ENCOUNTER — OFFICE VISIT (OUTPATIENT)
Dept: CARDIOLOGY | Facility: CLINIC | Age: 85
End: 2020-08-27

## 2020-08-27 VITALS
OXYGEN SATURATION: 99 % | WEIGHT: 220.6 LBS | HEART RATE: 67 BPM | SYSTOLIC BLOOD PRESSURE: 140 MMHG | TEMPERATURE: 96.9 F | BODY MASS INDEX: 31.58 KG/M2 | HEIGHT: 70 IN | DIASTOLIC BLOOD PRESSURE: 73 MMHG

## 2020-08-27 DIAGNOSIS — I10 ESSENTIAL HYPERTENSION: ICD-10-CM

## 2020-08-27 DIAGNOSIS — I48.20 CHRONIC ATRIAL FIBRILLATION (HCC): Primary | ICD-10-CM

## 2020-08-27 DIAGNOSIS — R06.02 SHORTNESS OF BREATH: ICD-10-CM

## 2020-08-27 PROCEDURE — 99213 OFFICE O/P EST LOW 20 MIN: CPT | Performed by: PHYSICIAN ASSISTANT

## 2020-08-27 NOTE — PROGRESS NOTES
Problem list     Subjective   Martín Campa is a 85 y.o. male     Chief Complaint   Patient presents with   • Atrial Fibrillation     here for 6 month f/u   • Shortness of Breath   • Hypertension   • Fatigue   Problem List:  1. Atrial fibrillation with RVR during hospitalization, October 2014.  1.1 Status post cardioversion, December 2014.  1.2 Residual A flutter, despite increased dosing of Flecainide.  1.3 Repeat Cardioversion, 09/2016, with maintenance of SR with Flecainide.  The patient has recently been discontinued from flecainide by EP services, per his report.  1.4 no recommendation for rate control given recurrent bradycardia dysrhythmic activity.  The patient is followed closely through EP services for the same at this time.  2. 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 Hypertension  4. Dyslipidemia      5. Prostate cancer      6. First degree AVB    HPI  The patient presents in today for routine follow-up.  He did see EP services recently.  He tells me that his flecainide was discontinued at that time.  He was not instituted on rate control therapy empirically given recurrent bradycardia noted by implantable loop recorder interrogations.  That is followed by his electrophysiologist.  Symptomatically, the patient has no chest pain.  He has stable dyspnea.  He has no failure or dysrhythmic symptoms.  He denies dizziness or syncope.  Blood pressures appear to be fairly well controlled.  The patient has no further complaints otherwise at this time.    Current Outpatient Medications on File Prior to Visit   Medication Sig Dispense Refill   • amLODIPine (NORVASC) 5 MG tablet Take 1 tablet by mouth Daily. 90 tablet 3   • apixaban (ELIQUIS) 5 MG tablet tablet Take 1 tablet by mouth Every 12 (Twelve) Hours. 28 tablet 5   • bicalutamide (CASODEX) 50 MG chemo tablet Take 1 tablet by mouth Daily. 30 tablet 11   • Bioflavonoid Products (SCOTT-C) 500-550 MG tablet Take 1 tablet by mouth Daily.     •  Cholecalciferol (VITAMIN D3) 5000 UNITS capsule capsule Take 5,000 Units by mouth Daily.     • folic acid (FOLVITE) 800 MCG tablet Take 800 mcg by mouth daily.     • levothyroxine (SYNTHROID, LEVOTHROID) 75 MCG tablet Take 75 mcg by mouth Daily.  0   • lisinopril (PRINIVIL,ZESTRIL) 5 MG tablet Take 5 mg by mouth Daily.     • niacin (NIACIN SR,NIASPAN ER) 500 MG CR capsule Take 500 mg by mouth Every Night.     • triamterene-hydrochlorothiazide (MAXZIDE-25) 37.5-25 MG per tablet Take 1 tablet by mouth daily.       No current facility-administered medications on file prior to visit.        Patient has no known allergies.    Past Medical History:   Diagnosis Date   • Atrial fibrillation (CMS/HCC)    • Coronary artery disease    • Hydronephrosis 6/30/2020   • Hyperlipidemia    • Hypertension    • Prostate cancer (CMS/HCC)        Social History     Socioeconomic History   • Marital status:      Spouse name: Not on file   • Number of children: Not on file   • Years of education: Not on file   • Highest education level: Not on file   Tobacco Use   • Smoking status: Never Smoker   • Smokeless tobacco: Never Used   Substance and Sexual Activity   • Alcohol use: No   • Drug use: No   • Sexual activity: Defer       Family History   Problem Relation Age of Onset   • Kidney disease Mother    • Sudden death Other    • Cancer Other        Review of Systems   Constitutional: Positive for fatigue. Negative for chills, diaphoresis and fever.   HENT: Negative.    Eyes: Positive for visual disturbance.   Respiratory: Positive for shortness of breath (with increased activity). Negative for apnea, cough, chest tightness and wheezing.    Cardiovascular: Positive for leg swelling (ankles). Negative for chest pain and palpitations.   Gastrointestinal: Positive for nausea. Negative for abdominal pain, blood in stool, constipation, diarrhea and vomiting.   Endocrine: Negative.    Genitourinary: Positive for difficulty urinating.  "Negative for hematuria.        Seeing Dr Maldonado at [facility] for kidney mass and BPH   Musculoskeletal: Positive for arthralgias, back pain and gait problem. Negative for myalgias and neck pain.   Skin: Negative.  Negative for rash and wound.   Allergic/Immunologic: Positive for environmental allergies. Negative for food allergies.   Neurological: Positive for weakness. Negative for dizziness, syncope, light-headedness, numbness and headaches.   Hematological: Bruises/bleeds easily.   Psychiatric/Behavioral: Negative.  Negative for agitation and sleep disturbance. The patient is not nervous/anxious.        Objective   Vitals:    08/27/20 1109   BP: 140/73   BP Location: Left arm   Patient Position: Sitting   Pulse: 67   Temp: 96.9 °F (36.1 °C)   SpO2: 99%   Weight: 100 kg (220 lb 9.6 oz)   Height: 177.8 cm (70\")      /73 (BP Location: Left arm, Patient Position: Sitting)   Pulse 67   Temp 96.9 °F (36.1 °C)   Ht 177.8 cm (70\")   Wt 100 kg (220 lb 9.6 oz)   SpO2 99%   BMI 31.65 kg/m²    Lab Results (most recent)     None        Physical Exam   Constitutional: He is oriented to person, place, and time. He appears well-developed and well-nourished. No distress.   HENT:   Head: Normocephalic and atraumatic.   Eyes: Pupils are equal, round, and reactive to light. Conjunctivae and EOM are normal.   Neck: Normal range of motion. Neck supple. No JVD present. No tracheal deviation present.   Cardiovascular: Normal rate, regular rhythm and intact distal pulses.   Murmur (Soft SHAD) heard.   Systolic murmur is present with a grade of 1/6.  Pulmonary/Chest: Effort normal and breath sounds normal.   Abdominal: Soft. Bowel sounds are normal. He exhibits no distension and no mass. There is no tenderness. There is no rebound and no guarding.   Musculoskeletal: Normal range of motion. He exhibits no edema, tenderness or deformity.   Neurological: He is alert and oriented to person, place, and time.   Skin: Skin is " warm and dry. No rash noted. No erythema. No pallor.   Psychiatric: He has a normal mood and affect. His behavior is normal. Judgment and thought content normal.   Nursing note and vitals reviewed.        Procedure   Procedures       Assessment/Plan      Diagnosis Plan   1. Chronic atrial fibrillation (CMS/HCC)     2. Shortness of breath     3. Essential hypertension       1.  The patient appears stable at this time.  I will continue medical regimen without change.  He does take anticoagulation therapy given A. fib.  A. fib status is followed to EP services.    2.  Symptomatically, the patient has stable dyspnea.  He has no symptoms of angina, failure, or dysrhythmias.  I do not feel further work-up would be warranted from cardiovascular standpoint at this time.    3.  The patient remains normotensive on current antihypertensive regimen.  We will continue those therapies without change.  He will monitor blood pressures closely at home and call to us for any issues.    4.  We will anticipate seeing the patient back on 6-month intervals.  He will call for complications.           Martín Campa  reports that he has never smoked. He has never used smokeless tobacco.        Patient's Body mass index is 31.65 kg/m². BMI is above normal parameters. Recommendations include: educational material.             Electronically signed by:

## 2020-08-27 NOTE — PATIENT INSTRUCTIONS

## 2020-09-23 ENCOUNTER — TELEPHONE (OUTPATIENT)
Dept: CARDIOLOGY | Facility: CLINIC | Age: 85
End: 2020-09-23

## 2020-09-23 NOTE — TELEPHONE ENCOUNTER
Pt LVM requesting Eliquis samples.  #[phone]      Set aside 2 boxes for pt, he needs to P/U within the next 7 days.       Called and informed pt of the above, he verbalized understanding.

## 2020-10-15 ENCOUNTER — TELEPHONE (OUTPATIENT)
Dept: CARDIOLOGY | Facility: CLINIC | Age: 85
End: 2020-10-15

## 2020-10-15 NOTE — TELEPHONE ENCOUNTER
Patient called requesting samples of Eliquis and was notified that they are ready to be picked up.  CHANTE HERNANDEZ

## 2020-10-19 DIAGNOSIS — I48.0 PAROXYSMAL ATRIAL FIBRILLATION (HCC): ICD-10-CM

## 2020-10-19 DIAGNOSIS — I10 ESSENTIAL HYPERTENSION: ICD-10-CM

## 2020-11-18 ENCOUNTER — TELEPHONE (OUTPATIENT)
Dept: CARDIOLOGY | Facility: CLINIC | Age: 85
End: 2020-11-18

## 2020-11-18 NOTE — TELEPHONE ENCOUNTER
Pt PATRICIAM requesting Eliquis samples.  #[phone]      Set aside two boxes for him.       Informed pt of the above, he verbalized understanding.

## 2021-03-19 ENCOUNTER — OFFICE VISIT (OUTPATIENT)
Dept: CARDIOLOGY | Facility: CLINIC | Age: 86
End: 2021-03-19

## 2021-03-19 VITALS
TEMPERATURE: 96.2 F | SYSTOLIC BLOOD PRESSURE: 122 MMHG | OXYGEN SATURATION: 99 % | DIASTOLIC BLOOD PRESSURE: 64 MMHG | BODY MASS INDEX: 29.68 KG/M2 | WEIGHT: 212 LBS | HEIGHT: 71 IN | HEART RATE: 79 BPM

## 2021-03-19 DIAGNOSIS — I48.21 PERMANENT ATRIAL FIBRILLATION (HCC): ICD-10-CM

## 2021-03-19 DIAGNOSIS — R00.1 BRADYCARDIA: Primary | ICD-10-CM

## 2021-03-19 DIAGNOSIS — I10 ESSENTIAL HYPERTENSION: ICD-10-CM

## 2021-03-19 PROCEDURE — 99214 OFFICE O/P EST MOD 30 MIN: CPT | Performed by: PHYSICIAN ASSISTANT

## 2021-03-19 RX ORDER — DAROLUTAMIDE 300 MG/1
600 TABLET, FILM COATED ORAL 2 TIMES DAILY
COMMUNITY
End: 2022-10-13 | Stop reason: ALTCHOICE

## 2021-03-19 NOTE — PROGRESS NOTES
Martín Campa  1935  PCP: [place] MD    SUBJECTIVE:   Martín Campa is a 85 y.o. male seen for a follow up visit regarding the following:     Chief Complaint: Follow up for permanent atrial fibrillation, syncope, loop interrogation     HPI:    Since last visit the patient's status has been stable from a cardiac standpoint. His Flecainide was stopped at last visit and he has not noticed any difference in how he feels. HRs have been stable. No chest pain, sob, edema, palps.     Cardiac PMH: (Old records have been reviewed and summarized below)  1. Atrial fibrillation with RVR during hospitalization, October 2014.                    A. Status post cardioversion, December 2014. EF 60-65%                    B. Had been initially started on amiodarone but stopped about one year ago (was on for 10 months) due to thyroid issues, on synthroid                    C. Residual A flutter/Afib, despite increased dosing of Flecainide now on 100 mg BID. No recent cardioversions                    D. CHADS-VASC= 3, On Eliquis 5mg BID. Recently decreased to 2.5 mg BID due to nose bleeds                    E. On Eliquis 5 mg BID, Flec 100 mg BID ECV in Sept 2016. Maintaining SR and not feeling any real difference in Afib vs SR                    F. Episode of Afib noted on Event Monitor with symptoms of LH noted with slow HRs      2. 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 Hypertension  4. Syncope    A. S/p [place] loop recorder 2019   4. Dyslipidemia      5. Prostate cancer      Current Outpatient Medications:   •  amLODIPine (NORVASC) 5 MG tablet, Take 1 tablet by mouth Daily., Disp: 90 tablet, Rfl: 3  •  apixaban (ELIQUIS) 5 MG tablet tablet, Take 1 tablet by mouth Every 12 (Twelve) Hours., Disp: 28 tablet, Rfl: 0  •  Bioflavonoid Products (SCOTT-C) 500-550 MG tablet, Take 1 tablet by mouth Daily., Disp: , Rfl:   •  Cholecalciferol (VITAMIN D3) 5000 UNITS capsule capsule, Take 5,000 Units by mouth  Daily., Disp: , Rfl:   •  Darolutamide (Nubeqa) 300 MG tablet, Take 600 mg by mouth 2 (two) times a day., Disp: , Rfl:   •  levothyroxine (SYNTHROID, LEVOTHROID) 75 MCG tablet, Take 75 mcg by mouth Daily., Disp: , Rfl: 0  •  lisinopril (PRINIVIL,ZESTRIL) 5 MG tablet, Take 5 mg by mouth Daily., Disp: , Rfl:   •  triamterene-hydrochlorothiazide (MAXZIDE-25) 37.5-25 MG per tablet, Take 1 tablet by mouth daily., Disp: , Rfl:   •  bicalutamide (CASODEX) 50 MG chemo tablet, Take 1 tablet by mouth Daily., Disp: 30 tablet, Rfl: 11  •  folic acid (FOLVITE) 800 MCG tablet, Take 800 mcg by mouth daily., Disp: , Rfl:   •  niacin (NIACIN SR,NIASPAN ER) 500 MG CR capsule, Take 500 mg by mouth Every Night., Disp: , Rfl:     Past Medical History, Past Surgical History, Family history, Social History, and Medications were all reviewed with the patient today and updated as necessary.       Patient Active Problem List   Diagnosis   • Atrial fibrillation (CMS/HCC)   • Coronary arteriosclerosis in native artery   • Dyslipidemia   • Hypertension   • Impotence of organic origin   • Induratio penis plastica   • Malignant neoplasm of prostate (CMS/HCC)   • Atrial flutter (CMS/HCC)   • Prostate cancer (CMS/HCC)   • Syncope   • Bradycardia   • Hydronephrosis   • Long term current use of antiarrhythmic medical therapy     No Known Allergies  Past Medical History:   Diagnosis Date   • Atrial fibrillation (CMS/HCC)    • Coronary artery disease    • Hydronephrosis 6/30/2020   • Hyperlipidemia    • Hypertension    • Prostate cancer (CMS/HCC)      Past Surgical History:   Procedure Laterality Date   • CARDIAC CATHETERIZATION     • CARDIAC SURGERY      cardiac loop implant   • HERNIA REPAIR       Family History   Problem Relation Age of Onset   • Kidney disease Mother    • Sudden death Other    • Cancer Other      Social History     Tobacco Use   • Smoking status: Never Smoker   • Smokeless tobacco: Never Used   Substance Use Topics   • Alcohol use:  "No         PHYSICAL EXAM:    /64 (BP Location: Left arm, Patient Position: Sitting)   Pulse 79   Temp 96.2 °F (35.7 °C)   Ht 180.3 cm (71\")   Wt 96.2 kg (212 lb)   SpO2 99%   BMI 29.57 kg/m²        Wt Readings from Last 5 Encounters:   03/19/21 96.2 kg (212 lb)   08/27/20 100 kg (220 lb 9.6 oz)   07/17/20 101 kg (222 lb)   06/30/20 106 kg (234 lb)   06/16/20 106 kg (234 lb)       BP Readings from Last 5 Encounters:   03/19/21 122/64   08/27/20 140/73   07/17/20 118/60   02/27/20 119/62   10/25/19 120/80       General-Well Nourished, Well developed  Eyes - PERRLA  Neck- supple, No mass  CV- regular rate and rhythm, no MRG, No edema  Lung- clear bilaterally  Abd- soft, +BS  Musc/skel - Norm strength and range of motion  Skin- warm and dry  Neuro - Alert & Oriented x 3, appropriate mood.        Medical problems and test results were reviewed with the patient today.     No results found for this or any previous visit (from the past 672 hour(s)).      EKG: (EKG has been independently visualized by me and summarized below)    Procedures     ASSESSMENT and PLAN    1. Atrial Fibrillation - No Symptoms, stable rates. A. Fib has progressed to Perm - Flecainide stopped at last visit. Continue Eliquis for anticoagulation   2. Bradycardia - Has a loop recorder in place - Monitor for symptoms - May Pacemaker in the future. Rates stable for now.   3. Syncope - Has a loop recorder to monitor - Placed Feb 2019 - no documented events in past. Not able to interrogate today as it is likely at EOL. Dr. Rosario implanted and patient wishes to have it removed in [place]. He can follow-up with Dr. Rosario's office about this.   4. Essential hypertension - BP well-controlled. Continue Amlodipine and Maxzide   5. Flecainide use - Monitoring EKG - stopped at last visit       Return in about 6 months (around 9/19/2021) for SJM.        Narda Roldan PA-C   Cardiology/Electrophysiology  3/19/2021  10:47 EDT  "

## 2021-05-06 DIAGNOSIS — I48.0 PAROXYSMAL ATRIAL FIBRILLATION (HCC): ICD-10-CM

## 2021-05-06 DIAGNOSIS — I10 ESSENTIAL HYPERTENSION: ICD-10-CM

## 2021-05-06 RX ORDER — APIXABAN 5 MG/1
TABLET, FILM COATED ORAL
Qty: 60 TABLET | Refills: 2 | Status: SHIPPED | OUTPATIENT
Start: 2021-05-06 | End: 2021-10-15 | Stop reason: SDUPTHER

## 2021-06-04 DIAGNOSIS — I10 ESSENTIAL HYPERTENSION: ICD-10-CM

## 2021-06-04 RX ORDER — AMLODIPINE BESYLATE 5 MG/1
5 TABLET ORAL DAILY
Qty: 90 TABLET | Refills: 3 | Status: SHIPPED | OUTPATIENT
Start: 2021-06-04 | End: 2021-06-07

## 2021-06-04 NOTE — TELEPHONE ENCOUNTER
Refill pended to provider     AT [facility]          ----- Message from oHuston Starr sent at 6/3/2021 11:27 AM EDT -----  Pt came into office and needs a refill on amlodipine 5mg sent to [facility] at [facility] in [place]. Please and thank you.

## 2021-06-07 RX ORDER — AMLODIPINE BESYLATE 5 MG/1
TABLET ORAL
Qty: 90 TABLET | Refills: 0 | Status: SHIPPED | OUTPATIENT
Start: 2021-06-07 | End: 2022-09-01

## 2021-07-07 ENCOUNTER — OFFICE VISIT (OUTPATIENT)
Dept: CARDIOLOGY | Facility: CLINIC | Age: 86
End: 2021-07-07

## 2021-07-07 ENCOUNTER — LAB (OUTPATIENT)
Dept: LAB | Facility: HOSPITAL | Age: 86
End: 2021-07-07

## 2021-07-07 VITALS
HEART RATE: 79 BPM | DIASTOLIC BLOOD PRESSURE: 61 MMHG | OXYGEN SATURATION: 99 % | SYSTOLIC BLOOD PRESSURE: 127 MMHG | WEIGHT: 222 LBS | BODY MASS INDEX: 31.08 KG/M2 | HEIGHT: 71 IN

## 2021-07-07 DIAGNOSIS — I10 ESSENTIAL HYPERTENSION: Primary | ICD-10-CM

## 2021-07-07 DIAGNOSIS — K62.5 RECTAL BLEEDING: ICD-10-CM

## 2021-07-07 DIAGNOSIS — R06.02 SHORTNESS OF BREATH: ICD-10-CM

## 2021-07-07 DIAGNOSIS — R07.89 CHEST TIGHTNESS: ICD-10-CM

## 2021-07-07 DIAGNOSIS — I48.0 PAROXYSMAL ATRIAL FIBRILLATION (HCC): ICD-10-CM

## 2021-07-07 LAB
BASOPHILS # BLD AUTO: 0.04 10*3/MM3 (ref 0–0.2)
BASOPHILS NFR BLD AUTO: 0.6 % (ref 0–1.5)
DEPRECATED RDW RBC AUTO: 49.1 FL (ref 37–54)
EOSINOPHIL # BLD AUTO: 0.39 10*3/MM3 (ref 0–0.4)
EOSINOPHIL NFR BLD AUTO: 5.5 % (ref 0.3–6.2)
ERYTHROCYTE [DISTWIDTH] IN BLOOD BY AUTOMATED COUNT: 13.9 % (ref 12.3–15.4)
HCT VFR BLD AUTO: 33.8 % (ref 37.5–51)
HGB BLD-MCNC: 11 G/DL (ref 13–17.7)
IMM GRANULOCYTES # BLD AUTO: 0.02 10*3/MM3 (ref 0–0.05)
IMM GRANULOCYTES NFR BLD AUTO: 0.3 % (ref 0–0.5)
LYMPHOCYTES # BLD AUTO: 1.68 10*3/MM3 (ref 0.7–3.1)
LYMPHOCYTES NFR BLD AUTO: 23.8 % (ref 19.6–45.3)
MCH RBC QN AUTO: 31.3 PG (ref 26.6–33)
MCHC RBC AUTO-ENTMCNC: 32.5 G/DL (ref 31.5–35.7)
MCV RBC AUTO: 96.3 FL (ref 79–97)
MONOCYTES # BLD AUTO: 0.89 10*3/MM3 (ref 0.1–0.9)
MONOCYTES NFR BLD AUTO: 12.6 % (ref 5–12)
NEUTROPHILS NFR BLD AUTO: 4.05 10*3/MM3 (ref 1.7–7)
NEUTROPHILS NFR BLD AUTO: 57.2 % (ref 42.7–76)
NRBC BLD AUTO-RTO: 0 /100 WBC (ref 0–0.2)
PLATELET # BLD AUTO: 207 10*3/MM3 (ref 140–450)
PMV BLD AUTO: 10.7 FL (ref 6–12)
RBC # BLD AUTO: 3.51 10*6/MM3 (ref 4.14–5.8)
WBC # BLD AUTO: 7.07 10*3/MM3 (ref 3.4–10.8)

## 2021-07-07 PROCEDURE — 99214 OFFICE O/P EST MOD 30 MIN: CPT | Performed by: NURSE PRACTITIONER

## 2021-07-07 PROCEDURE — 36415 COLL VENOUS BLD VENIPUNCTURE: CPT

## 2021-07-07 PROCEDURE — 85025 COMPLETE CBC W/AUTO DIFF WBC: CPT

## 2021-07-07 PROCEDURE — 93000 ELECTROCARDIOGRAM COMPLETE: CPT | Performed by: NURSE PRACTITIONER

## 2021-07-07 NOTE — PATIENT INSTRUCTIONS
Acute Coronary Syndrome  Acute coronary syndrome (ACS) is a serious problem in which there is suddenly not enough blood and oxygen reaching the heart. ACS can result in chest pain or a heart attack.  This condition is a medical emergency. If you have any symptoms of this condition, get help right away.  What are the causes?  This condition may be caused by:  · A buildup of fat and cholesterol inside the arteries (atherosclerosis). This is the most common cause. The buildup (plaque) can cause blood vessels in the heart (coronary arteries) to become narrow or blocked, which reduces blood flow to the heart. Plaque can also break off and lead to a clot, which can block an artery and cause a heart attack or stroke.  · Sudden tightening of the muscles around the coronary arteries (coronary spasm).  · Tearing of a coronary artery (spontaneous coronary artery dissection).  · Very low blood pressure (hypotension).  · An abnormal heartbeat (arrhythmia).  · Other medical conditions that cause a decrease of oxygen to the heart, such as anemiaorrespiratory failure.  · Using cocaine or methamphetamine.  What increases the risk?  The following factors may make you more likely to develop this condition:  · Age. The risk for ACS increases as you get older.  · History of chest pain, heart attack, peripheral artery disease, or stroke.  · Having taken chemotherapy or immune-suppressing medicines.  · Being male.  · Family history of chest pain, heart disease, or stroke.  · Smoking.  · Not exercising enough.  · Being overweight.  · High cholesterol.  · High blood pressure (hypertension).  · Diabetes.  · Excessive alcohol use.  What are the signs or symptoms?  Common symptoms of this condition include:  · Chest pain. The pain may last a long time, or it may stop and come back (recur). It may feel like:  ? Crushing or squeezing.  ? Tightness, pressure, fullness, or heaviness.  · Arm, neck, jaw, or back pain.  · Heartburn or  indigestion.  · Shortness of breath.  · Nausea.  · Sudden cold sweats.  · Light-headedness.  · Dizziness or passing out.  · Tiredness (fatigue).  Sometimes there are no symptoms.  How is this diagnosed?  This condition may be diagnosed based on:  · Your medical history and symptoms.  · Imaging tests, such as:  ? An electrocardiogram (ECG). This measures the heart's electrical activity.  ? X-rays.  ? CT scan.  ? A coronary angiogram. For this test, dye is injected into the heart arteries and then X-rays are taken.  ? Myocardial perfusion imaging. This test shows how well blood flows through your heart muscle.  · Blood tests. These may be repeated at certain time intervals.  · Exercise stress testing.  · Echocardiogram. This is a test that uses sound waves to produce detailed images of the heart.  How is this treated?  Treatment for this condition may include:  · Oxygen therapy.  · Medicines, such as:  ? Antiplatelet medicines and blood-thinning medicines, such as aspirin. These help prevent blood clots.  ? Medicine that dissolves any blood clots (fibrinolytic therapy).  ? Blood pressure medicines.  ? Nitroglycerin. This helps widen blood vessels to improve blood flow.  ? Pain medicine.  ? Cholesterol-lowering medicine.  · Surgery, such as:  ? Coronary angioplasty with stent placement. This involves placing a small piece of metal that looks like mesh or a spring into a narrow coronary artery. This widens the artery and keeps it open.  ? Coronary artery bypass surgery. This involves taking a section of a blood vessel from a different part of your body and placing it on the blocked coronary artery to allow blood to flow around the blockage.  · Cardiac rehabilitation. This is a program that includes exercise training, education, and counseling to help you recover.  Follow these instructions at home:  Eating and drinking  · Eat a heart-healthy diet that includes whole grains, fruits and vegetables, lean proteins, and  low-fat or nonfat dairy products.  · Limit how much salt (sodium) you eat as told by your health care provider. Follow instructions from your health care provider about any other eating or drinking restrictions, such as limiting foods that are high in fat and processed sugars.  · Use healthy cooking methods such as roasting, grilling, broiling, baking, poaching, steaming, or stir-frying.  · Work with a dietitian to follow a heart-healthy eating plan.  Medicines  · Take over-the-counter and prescription medicines only as told by your health care provider.  · Do not take these medicines unless your health care provider approves:  ? Vitamin supplements that contain vitamin A or vitamin E.  ? NSAIDs, such as ibuprofen, naproxen, or celecoxib.  ? Hormone replacement therapy that contains estrogen.  · If you are taking blood thinners:  ? Talk with your health care provider before you take any medicines that contain aspirin or NSAIDs. These medicines increase your risk for dangerous bleeding.  ? Take your medicine exactly as told, at the same time every day.  ? Avoid activities that could cause injury or bruising, and follow instructions about how to prevent falls.  ? Wear a medical alert bracelet, and carry a card that lists what medicines you take.  Activity  · Follow your cardiac rehabilitation program. Do exercises as told by your physical therapist.  · Ask your health care provider what activities and exercises are safe for you. Follow his or her instructions about lifting, driving, or climbing stairs.  Lifestyle  · Do not use any products that contain nicotine or tobacco, such as cigarettes, e-cigarettes, and chewing tobacco. If you need help quitting, ask your health care provider.  · Do not drink alcohol if your health care provider tells you not to drink.  · If you drink alcohol:  ? Limit how much you have to 0-1 drink a day.  ? Be aware of how much alcohol is in your drink. In the [place] one drink equals one 12 oz  bottle of beer (355 mL), one 5 oz glass of wine (148 mL), or one 1½ oz glass of hard liquor (44 mL).  · Maintain a healthy weight. If you need to lose weight, work with your health care provider to do so safely.  General instructions  · Tell all the health care providers who provide care for you about your heart condition, including your dentist. This may affect the medicines or treatment you receive.  · Manage any other health conditions you have, such as hypertension or diabetes. These conditions affect your heart.  · Pay attention to your mental health. You may be at higher risk for depression.  ? Find ways to manage stress.  ? Talk to your health care provider about depression screening and treatment.  · Keep your vaccinations up to date.  ? Get the flu shot (influenza vaccine) every year.  ? Get the pneumococcal vaccine if you are age 65 or older.  · If directed, monitor your blood pressure at home.  · Keep all follow-up visits as told by your health care provider. This is important.  Contact a health care provider if you:  · Feel overwhelmed or sad.  · Have trouble doing your daily activities.  Get help right away if you:  · Have pain in your chest, neck, arm, jaw, stomach, or back that recurs, and:  ? It lasts for more than a few minutes.  ? It is not relieved by taking the medicineyour health care provider prescribed.  · Have unexplained:  ? Heavy sweating.  ? Heartburn or indigestion.  ? Nausea or vomiting.  ? Shortness of breath.  ? Difficulty breathing.  ? Fatigue.  ? Nervousness or anxiety.  ? Weakness.  ? Diarrhea.  ? Dark stools or blood in your stool.  · Have sudden light-headedness or dizziness.  · Have blood pressure that is higher than 180/120.  · Faint.  · Have thoughts about hurting yourself.  These symptoms may represent a serious problem that is an emergency. Do not wait to see if the symptoms will go away. Get medical help right away. Call your local emergency services (911 in the [place] Do  not drive yourself to the hospital.   Summary  · Acute coronary syndrome (ACS) is when there is not enough blood and oxygen being supplied to the heart. ACS can result in chest pain or a heart attack.  · Acute coronary syndrome is a medical emergency. If you have any symptoms of this condition, get help right away.  · Treatment includes medicines and procedures to open the blocked arteries and restore blood flow.  This information is not intended to replace advice given to you by your health care provider. Make sure you discuss any questions you have with your health care provider.  Document Revised: 05/20/2020 Document Reviewed: 12/30/2019  [employer] Patient Education © 2021 [employer]      Atrial Fibrillation    Atrial fibrillation is a type of heartbeat that is irregular or fast. If you have this condition, your heart beats without any order. This makes it hard for your heart to pump blood in a normal way.  Atrial fibrillation may come and go, or it may become a long-lasting problem. If this condition is not treated, it can put you at higher risk for stroke, heart failure, and other heart problems.  What are the causes?  This condition may be caused by diseases that damage the heart. They include:  · High blood pressure.  · Heart failure.  · Heart valve disease.  · Heart surgery.  Other causes include:  · Diabetes.  · Thyroid disease.  · Being overweight.  · Kidney disease.  Sometimes the cause is not known.  What increases the risk?  You are more likely to develop this condition if:  · You are older.  · You smoke.  · You exercise often and very hard.  · You have a family history of this condition.  · You are a man.  · You use drugs.  · You drink a lot of alcohol.  · You have lung conditions, such as emphysema, pneumonia, or COPD.  · You have sleep apnea.  What are the signs or symptoms?  Common symptoms of this condition include:  · A feeling that your heart is beating very fast.  · Chest pain or  discomfort.  · Feeling short of breath.  · Suddenly feeling light-headed or weak.  · Getting tired easily during activity.  · Fainting.  · Sweating.  In some cases, there are no symptoms.  How is this treated?  Treatment for this condition depends on underlying conditions and how you feel when you have atrial fibrillation. They include:  · Medicines to:  ? Prevent blood clots.  ? Treat heart rate or heart rhythm problems.  · Using devices, such as a pacemaker, to correct heart rhythm problems.  · Doing surgery to remove the part of the heart that sends bad signals.  · Closing an area where clots can form in the heart (left atrial appendage).  In some cases, your doctor will treat other underlying conditions.  Follow these instructions at home:  Medicines  · Take over-the-counter and prescription medicines only as told by your doctor.  · Do not take any new medicines without first talking to your doctor.  · If you are taking blood thinners:  ? Talk with your doctor before you take any medicines that have aspirin or NSAIDs, such as ibuprofen, in them.  ? Take your medicine exactly as told by your doctor. Take it at the same time each day.  ? Avoid activities that could hurt or bruise you. Follow instructions about how to prevent falls.  ? Wear a bracelet that says you are taking blood thinners. Or, carry a card that lists what medicines you take.  Lifestyle         · Do not use any products that have nicotine or tobacco in them. These include cigarettes, e-cigarettes, and chewing tobacco. If you need help quitting, ask your doctor.  · Eat heart-healthy foods. Talk with your doctor about the right eating plan for you.  · Exercise regularly as told by your doctor.  · Do not drink alcohol.  · Lose weight if you are overweight.  · Do not use drugs, including cannabis.  General instructions  · If you have a condition that causes breathing to stop for a short period of time (apnea), treat it as told by your doctor.  · Keep  "a healthy weight. Do not use diet pills unless your doctor says they are safe for you. Diet pills may make heart problems worse.  · Keep all follow-up visits as told by your doctor. This is important.  Contact a doctor if:  · You notice a change in the speed, rhythm, or strength of your heartbeat.  · You are taking a blood-thinning medicine and you get more bruising.  · You get tired more easily when you move or exercise.  · You have a sudden change in weight.  Get help right away if:    · You have pain in your chest or your belly (abdomen).  · You have trouble breathing.  · You have side effects of blood thinners, such as blood in your vomit, poop (stool), or pee (urine), or bleeding that cannot stop.  · You have any signs of a stroke. \"BE FAST\" is an easy way to remember the main warning signs:  ? B - Balance. Signs are dizziness, sudden trouble walking, or loss of balance.  ? E - Eyes. Signs are trouble seeing or a change in how you see.  ? F - Face. Signs are sudden weakness or loss of feeling in the face, or the face or eyelid drooping on one side.  ? A - Arms. Signs are weakness or loss of feeling in an arm. This happens suddenly and usually on one side of the body.  ? S - Speech. Signs are sudden trouble speaking, slurred speech, or trouble understanding what people say.  ? T - Time. Time to call emergency services. Write down what time symptoms started.  · You have other signs of a stroke, such as:  ? A sudden, very bad headache with no known cause.  ? Feeling like you may vomit (nausea).  ? Vomiting.  ? A seizure.  These symptoms may be an emergency. Do not wait to see if the symptoms will go away. Get medical help right away. Call your local emergency services (911 in the [place] Do not drive yourself to the hospital.  Summary  · Atrial fibrillation is a type of heartbeat that is irregular or fast.  · You are at higher risk of this condition if you smoke, are older, have diabetes, or are " overweight.  · Follow your doctor's instructions about medicines, diet, exercise, and follow-up visits.  · Get help right away if you have signs or symptoms of a stroke.  · Get help right away if you cannot catch your breath, or you have chest pain or discomfort.  This information is not intended to replace advice given to you by your health care provider. Make sure you discuss any questions you have with your health care provider.  Document Revised: 06/10/2020 Document Reviewed: 06/10/2020  [employer] Patient Education © 2021 [employer]      Hypertension, Adult  Hypertension is another name for high blood pressure. High blood pressure forces your heart to work harder to pump blood. This can cause problems over time.  There are two numbers in a blood pressure reading. There is a top number (systolic) over a bottom number (diastolic). It is best to have a blood pressure that is below 120/80. Healthy choices can help lower your blood pressure, or you may need medicine to help lower it.  What are the causes?  The cause of this condition is not known. Some conditions may be related to high blood pressure.  What increases the risk?  · Smoking.  · Having type 2 diabetes mellitus, high cholesterol, or both.  · Not getting enough exercise or physical activity.  · Being overweight.  · Having too much fat, sugar, calories, or salt (sodium) in your diet.  · Drinking too much alcohol.  · Having long-term (chronic) kidney disease.  · Having a family history of high blood pressure.  · Age. Risk increases with age.  · Race. You may be at higher risk if you are .  · Gender. Men are at higher risk than women before age 45. After age 65, women are at higher risk than men.  · Having obstructive sleep apnea.  · Stress.  What are the signs or symptoms?  · High blood pressure may not cause symptoms. Very high blood pressure (hypertensive crisis) may cause:  ? Headache.  ? Feelings of worry or nervousness  (anxiety).  ? Shortness of breath.  ? Nosebleed.  ? A feeling of being sick to your stomach (nausea).  ? Throwing up (vomiting).  ? Changes in how you see.  ? Very bad chest pain.  ? Seizures.  How is this treated?  · This condition is treated by making healthy lifestyle changes, such as:  ? Eating healthy foods.  ? Exercising more.  ? Drinking less alcohol.  · Your health care provider may prescribe medicine if lifestyle changes are not enough to get your blood pressure under control, and if:  ? Your top number is above 130.  ? Your bottom number is above 80.  · Your personal target blood pressure may vary.  Follow these instructions at home:  Eating and drinking    · If told, follow the DASH eating plan. To follow this plan:  ? Fill one half of your plate at each meal with fruits and vegetables.  ? Fill one fourth of your plate at each meal with whole grains. Whole grains include whole-wheat pasta, brown rice, and whole-grain bread.  ? Eat or drink low-fat dairy products, such as skim milk or low-fat yogurt.  ? Fill one fourth of your plate at each meal with low-fat (lean) proteins. Low-fat proteins include fish, chicken without skin, eggs, beans, and tofu.  ? Avoid fatty meat, cured and processed meat, or chicken with skin.  ? Avoid pre-made or processed food.  · Eat less than 1,500 mg of salt each day.  · Do not drink alcohol if:  ? Your doctor tells you not to drink.  ? You are pregnant, may be pregnant, or are planning to become pregnant.  · If you drink alcohol:  ? Limit how much you use to:  § 0-1 drink a day for women.  § 0-2 drinks a day for men.  ? Be aware of how much alcohol is in your drink. In the [place] one drink equals one 12 oz bottle of beer (355 mL), one 5 oz glass of wine (148 mL), or one 1½ oz glass of hard liquor (44 mL).  Lifestyle    · Work with your doctor to stay at a healthy weight or to lose weight. Ask your doctor what the best weight is for you.  · Get at least 30 minutes of exercise  most days of the week. This may include walking, swimming, or biking.  · Get at least 30 minutes of exercise that strengthens your muscles (resistance exercise) at least 3 days a week. This may include lifting weights or doing Pilates.  · Do not use any products that contain nicotine or tobacco, such as cigarettes, e-cigarettes, and chewing tobacco. If you need help quitting, ask your doctor.  · Check your blood pressure at home as told by your doctor.  · Keep all follow-up visits as told by your doctor. This is important.  Medicines  · Take over-the-counter and prescription medicines only as told by your doctor. Follow directions carefully.  · Do not skip doses of blood pressure medicine. The medicine does not work as well if you skip doses. Skipping doses also puts you at risk for problems.  · Ask your doctor about side effects or reactions to medicines that you should watch for.  Contact a doctor if you:  · Think you are having a reaction to the medicine you are taking.  · Have headaches that keep coming back (recurring).  · Feel dizzy.  · Have swelling in your ankles.  · Have trouble with your vision.  Get help right away if you:  · Get a very bad headache.  · Start to feel mixed up (confused).  · Feel weak or numb.  · Feel faint.  · Have very bad pain in your:  ? Chest.  ? Belly (abdomen).  · Throw up more than once.  · Have trouble breathing.  Summary  · Hypertension is another name for high blood pressure.  · High blood pressure forces your heart to work harder to pump blood.  · For most people, a normal blood pressure is less than 120/80.  · Making healthy choices can help lower blood pressure. If your blood pressure does not get lower with healthy choices, you may need to take medicine.  This information is not intended to replace advice given to you by your health care provider. Make sure you discuss any questions you have with your health care provider.  Document Revised: 08/28/2019 Document Reviewed:  08/28/2019  [employer] Patient Education © 2021 [employer]

## 2021-07-07 NOTE — PROGRESS NOTES
Subjective     Martín Campa is a 86 y.o. male who presents to day for Shortness of Breath (chest tightness--occured at night) and Atrial Fibrillation (6 mon f/u).    CHIEF COMPLIANT  Chief Complaint   Patient presents with   • Shortness of Breath     chest tightness--occured at night   • Atrial Fibrillation     6 mon f/u       Active Problems:  Problem List Items Addressed This Visit        Cardiac and Vasculature    Atrial fibrillation (CMS/HCC)    Relevant Orders    ECG 12 Lead    Loop Recorder Explant - Treatment Room    Hypertension - Primary    Relevant Orders    ECG 12 Lead      Other Visit Diagnoses     Shortness of breath        Relevant Orders    ECG 12 Lead    Chest tightness        Relevant Orders    ECG 12 Lead    Rectal bleeding        Relevant Orders    CBC & Differential      Problem List:  1. Atrial fibrillation with RVR during hospitalization, October 2014.  1.1 Status post cardioversion, December 2014.  1.2 Residual A flutter, despite increased dosing of Flecainide.  1.3 Repeat Cardioversion, 09/2016, with maintenance of SR with Flecainide.  The patient has recently been discontinued from flecainide by EP services, per his report.  1.4 no recommendation for rate control given recurrent bradycardia dysrhythmic activity.  The patient is followed closely through EP services for the same at this time.  2. 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 Hypertension  4. Dyslipidemia      5. Prostate cancer      6. First degree AVB    HPI  HPI  Mr. Campa is a 86-year-old male patient who is being followed up today for shortness of breath, chest tightness, history of atrial fibrillation who is being followed by EP services.  Patient does report chest tightness which she had one episode when evening was laying in bed tossing and turning was laying on his left side during the night and he said that he developed a pressure in the left side of his chest disorder felt like a little smothering type sensation  and he turned on his back and it relieved after about 30 to 40 minutes.  He had no other associated symptoms.  He says that he does not feel like he has any limitations associated from the cardiovascular standpoint he says most of his issues have to do with weakness of his legs and getting tired easily and has to sit down to rest.  He says any work that takes more than an hour he has to sit down and rest.  And then he does recover and get up and go at it again.  He says that he really does not have any significant shortness of breath and the smothering type sensation experience with the chest tightness.  He says at times he is even a little unsteady on his feet but he feels this is due to the weakness in his legs.  He does have a history of atrial fibrillation which he is on long-term anticoagulation with Eliquis in which he is having some rectal bleeding that has decreased he is being followed by Dr. Hu for a rectal fissure the bleeding has decreased and he has been treated for the last 2 months he denies any lightheadedness or dizziness.  We will recheck his CBC today.  He does report that he was taken off flecainide by the EP doctor due to the fact that he has been persistent A. fib now.  He also went to interrogate his loop recorder which did show that it was end-of-life and the battery had completely .  He also has chronic arterial hypertension which she is being treated with lisinopril, and amlodipine.  His blood pressure seems well controlled today at 127/61 heart rate is 79.  He denies any lower extremity edema, palpitations, dizziness, lightheadedness, syncope, PND, orthopnea, or other neurological problems.  PRIOR MEDS  Current Outpatient Medications on File Prior to Visit   Medication Sig Dispense Refill   • amLODIPine (NORVASC) 5 MG tablet TAKE ONE TABLET BY MOUTH DAILY 90 tablet 0   • apixaban (ELIQUIS) 5 MG tablet tablet Take 1 tablet by mouth Every 12 (Twelve) Hours. 28 tablet 0   •  bicalutamide (CASODEX) 50 MG chemo tablet Take 1 tablet by mouth Daily. 30 tablet 11   • Bioflavonoid Products (SCOTT-C) 500-550 MG tablet Take 1 tablet by mouth Daily.     • Cholecalciferol (VITAMIN D3) 5000 UNITS capsule capsule Take 5,000 Units by mouth Daily.     • Darolutamide (Nubeqa) 300 MG tablet Take 600 mg by mouth 2 (two) times a day.     • Eliquis 5 MG tablet tablet TAKE ONE TABLET BY MOUTH EVERY 12 HOURS 60 tablet 2   • folic acid (FOLVITE) 800 MCG tablet Take 800 mcg by mouth daily.     • levothyroxine (SYNTHROID, LEVOTHROID) 75 MCG tablet Take 75 mcg by mouth Daily.  0   • lisinopril (PRINIVIL,ZESTRIL) 5 MG tablet Take 5 mg by mouth Daily.     • niacin (NIACIN SR,NIASPAN ER) 500 MG CR capsule Take 500 mg by mouth Every Night.     • [DISCONTINUED] triamterene-hydrochlorothiazide (MAXZIDE-25) 37.5-25 MG per tablet Take 1 tablet by mouth daily.       No current facility-administered medications on file prior to visit.       ALLERGIES  Patient has no known allergies.    HISTORY  Past Medical History:   Diagnosis Date   • Anal fissure    • Atrial fibrillation (CMS/HCC)    • Coronary artery disease    • Hydronephrosis 6/30/2020   • Hyperlipidemia    • Hypertension    • Prostate cancer (CMS/HCC)        Social History     Socioeconomic History   • Marital status:      Spouse name: Not on file   • Number of children: Not on file   • Years of education: Not on file   • Highest education level: Not on file   Tobacco Use   • Smoking status: Never Smoker   • Smokeless tobacco: Never Used   Substance and Sexual Activity   • Alcohol use: No   • Drug use: No   • Sexual activity: Defer       Family History   Problem Relation Age of Onset   • Kidney disease Mother    • Sudden death Other    • Cancer Other        Review of Systems   Constitutional: Positive for fatigue. Negative for chills and unexpected weight change.   HENT: Negative.    Eyes: Negative.    Respiratory: Positive for chest tightness and shortness  "of breath.    Cardiovascular: Negative for chest pain, palpitations and leg swelling.   Gastrointestinal: Negative.    Endocrine: Negative.    Genitourinary: Negative.    Musculoskeletal: Negative.    Skin: Negative.    Allergic/Immunologic: Negative.    Neurological: Positive for weakness. Negative for dizziness, syncope and light-headedness.   Hematological: Does not bruise/bleed easily.   Psychiatric/Behavioral: Negative.        Objective     VITALS: /61 (BP Location: Left arm, Patient Position: Sitting)   Pulse 79   Ht 180.3 cm (71\")   Wt 101 kg (222 lb)   SpO2 99%   BMI 30.96 kg/m²     LABS:   Lab Results (most recent)     None          IMAGING:   No Images in the past 120 days found..    EXAM:  Physical Exam    Procedure     ECG 12 Lead    Date/Time: 7/7/2021 2:11 PM  Performed by: Willy Asher APRN  Authorized by: Willy Asher APRN   Comparison: compared with previous ECG from 7/17/2020  Comparison to previous ECG: Previously in atrial fibrillation  Rhythm: sinus rhythm  Ectopy: atrial premature contractions  Rate: normal  BPM: 79  Conduction: 1st degree AV block  QRS axis: normal  Other findings: non-specific ST-T wave changes  Comments:  ms  No acute changes               Assessment/Plan    Diagnosis Plan   1. Essential hypertension  ECG 12 Lead   2. Paroxysmal atrial fibrillation (CMS/HCC)  ECG 12 Lead    Loop Recorder Explant - Treatment Room   3. Shortness of breath  ECG 12 Lead   4. Chest tightness  ECG 12 Lead   5. Rectal bleeding  CBC & Differential   1.  Patient's blood pressure is well controlled on current blood pressure medication regimen.  No medication changes are warranted at this time.  Patient advised to monitor blood pressure on a daily basis and report any persistent highs or lows.  Set goal blood pressure for patient at 130/80 or below.  2.  Patient does have a history of atrial fibrillation in which she is being followed by EP services.  They recently removed him " off his antiarrhythmic and today he is in a sinus rhythm with frequent PACs.  He also has noted a first-degree AV block which does seem to be chronic.  3.  Patient shortness of breath and chest tightness occurred on 1 occasion and that is why he was laying on his left side.  We did discuss potential for cardiac work-up for ischemia in which he wishes to defer at this time.  4.  Patient has been having some rectal bleeding due to a rectal fissure which is being followed by Dr. Proctor.  He is being using a treatment for the last 2 months and it has slowed down but he still has some bleeding.  We will repeat his CBC and if patient is anemic we will hold Eliquis for 3 days and reevaluate at that time.  5.  Informed of signs and symptoms of ACS and advised to seek emergent treatment for any new worsening symptoms.  Patient also advised sooner follow-up as needed.  Also advised to follow-up with family doctor as needed  This note is dictated utilizing voice recognition software.  Although this record has been proof read, transcriptional errors may still be present. If questions occur regarding the content of this record please do not hesitate to call our office.  I have reviewed and confirmed the accuracy of the ROS as documented by the MA/LPN/RN ARMIDA Gregory    Return if symptoms worsen or fail to improve, for cath follow up 1-2 weeks.    Diagnoses and all orders for this visit:    1. Essential hypertension (Primary)  -     ECG 12 Lead    2. Paroxysmal atrial fibrillation (CMS/HCC)  -     ECG 12 Lead  -     Loop Recorder Explant - Treatment Room; Future    3. Shortness of breath  -     ECG 12 Lead    4. Chest tightness  -     ECG 12 Lead    5. Rectal bleeding  -     CBC & Differential; Future               MEDS ORDERED DURING VISIT:  No orders of the defined types were placed in this encounter.          This document has been electronically signed by ARMIDA Gregory Jr.  July 7, 2021 14:45 EDT

## 2021-07-08 ENCOUNTER — TELEPHONE (OUTPATIENT)
Dept: CARDIOLOGY | Facility: CLINIC | Age: 86
End: 2021-07-08

## 2021-07-08 NOTE — TELEPHONE ENCOUNTER
Patient informed of results and will keep follow up as scheduled. Results faxed to PCP. Pam Ware MA      ----- Message from ARMIDA Gregory sent at 7/8/2021 12:43 PM EDT -----  Regarding: FW:  Hgb is 11.00 no significant anemia  ----- Message -----  From: Lab, Background User  Sent: 7/7/2021   6:55 PM EDT  To: ARMIDA Gregory

## 2021-08-06 DIAGNOSIS — I10 ESSENTIAL HYPERTENSION: ICD-10-CM

## 2021-08-06 DIAGNOSIS — I48.0 PAROXYSMAL ATRIAL FIBRILLATION (HCC): ICD-10-CM

## 2021-08-06 NOTE — TELEPHONE ENCOUNTER
Pt left additional message for refill on Eliquis 5 mg.      Approved refill request and sent it to [facility] per pt request.    Attempted to call pt to make him aware that the request for his refill on Eliquis 5mg has been completed, however, the pt did not answer and no voicemail could be left.

## 2021-08-19 DIAGNOSIS — I10 ESSENTIAL HYPERTENSION: ICD-10-CM

## 2021-08-19 DIAGNOSIS — I48.0 PAROXYSMAL ATRIAL FIBRILLATION (HCC): ICD-10-CM

## 2021-09-13 ENCOUNTER — CLINICAL SUPPORT (OUTPATIENT)
Dept: CARDIOLOGY | Facility: CLINIC | Age: 86
End: 2021-09-13

## 2021-09-13 VITALS
BODY MASS INDEX: 30.94 KG/M2 | OXYGEN SATURATION: 97 % | SYSTOLIC BLOOD PRESSURE: 144 MMHG | HEIGHT: 71 IN | RESPIRATION RATE: 16 BRPM | DIASTOLIC BLOOD PRESSURE: 62 MMHG | WEIGHT: 221 LBS | HEART RATE: 71 BPM

## 2021-09-13 DIAGNOSIS — I48.0 PAROXYSMAL ATRIAL FIBRILLATION (HCC): Primary | ICD-10-CM

## 2021-09-13 NOTE — PROGRESS NOTES
"Martín Campa  1935 9/13/2021   ?   Chief Complaint   Patient presents with   • Nurse Visit     Wound check      ?   HPI:   ?   ?Pt presents to office today following successful explantation of [employer] medical loop recorder implant.  Verbalizes \" I just need the bandages taken off.\"  Reports no cardiac complications since having the loop recorder removed.    ?     Current Outpatient Medications:   •  amLODIPine (NORVASC) 5 MG tablet, TAKE ONE TABLET BY MOUTH DAILY, Disp: 90 tablet, Rfl: 0  •  apixaban (ELIQUIS) 5 MG tablet tablet, Take 1 tablet by mouth Every 12 (Twelve) Hours., Disp: 60 tablet, Rfl: 5  •  bicalutamide (CASODEX) 50 MG chemo tablet, Take 1 tablet by mouth Daily., Disp: 30 tablet, Rfl: 11  •  Bioflavonoid Products (SCOTT-C) 500-550 MG tablet, Take 1 tablet by mouth Daily., Disp: , Rfl:   •  Cholecalciferol (VITAMIN D3) 5000 UNITS capsule capsule, Take 5,000 Units by mouth Daily., Disp: , Rfl:   •  Darolutamide (Nubeqa) 300 MG tablet, Take 600 mg by mouth 2 (two) times a day., Disp: , Rfl:   •  Eliquis 5 MG tablet tablet, TAKE ONE TABLET BY MOUTH EVERY 12 HOURS, Disp: 60 tablet, Rfl: 2  •  folic acid (FOLVITE) 800 MCG tablet, Take 800 mcg by mouth daily., Disp: , Rfl:   •  levothyroxine (SYNTHROID, LEVOTHROID) 75 MCG tablet, Take 75 mcg by mouth Daily., Disp: , Rfl: 0  •  lisinopril (PRINIVIL,ZESTRIL) 5 MG tablet, Take 5 mg by mouth Daily., Disp: , Rfl:   •  niacin (NIACIN SR,NIASPAN ER) 500 MG CR capsule, Take 500 mg by mouth Every Night., Disp: , Rfl:    ?   ?   Patient has no known allergies.       Procedures     ?   Assessment/Plan      1.  Wound check    Wound checked by ARMIDA Gregory.  Appears free of infection.  No redness, tenderness, swelling, draining, bleeding or exudate present.  Incision site healing well.  Not hot to touch.    Follow up as scheduled.  Sooner if any signs of infection.    ?   ?       "

## 2021-10-15 ENCOUNTER — OFFICE VISIT (OUTPATIENT)
Dept: CARDIOLOGY | Facility: CLINIC | Age: 86
End: 2021-10-15

## 2021-10-15 VITALS
BODY MASS INDEX: 30.94 KG/M2 | SYSTOLIC BLOOD PRESSURE: 122 MMHG | HEART RATE: 65 BPM | WEIGHT: 221 LBS | DIASTOLIC BLOOD PRESSURE: 72 MMHG | OXYGEN SATURATION: 99 % | HEIGHT: 71 IN

## 2021-10-15 DIAGNOSIS — I10 PRIMARY HYPERTENSION: ICD-10-CM

## 2021-10-15 DIAGNOSIS — R00.1 BRADYCARDIA: ICD-10-CM

## 2021-10-15 DIAGNOSIS — I48.19 PERSISTENT ATRIAL FIBRILLATION (HCC): Primary | ICD-10-CM

## 2021-10-15 PROCEDURE — 99214 OFFICE O/P EST MOD 30 MIN: CPT | Performed by: STUDENT IN AN ORGANIZED HEALTH CARE EDUCATION/TRAINING PROGRAM

## 2021-10-15 NOTE — PROGRESS NOTES
Cardiac Electrophysiology Outpatient Note  [facility]    Office Visit     Martín Campa  [id number]  10/15/2021    Primary Care Physician: West Gatica MD    Referred By: No ref. provider found    Subjective     Chief Complaint   Patient presents with   • Essential hypertension   • Slow Heart Rate   • Atrial Fibrillation       History of Present Illness:   Martín Campa is a 86 y.o. male who presents to my electrophysiology clinic for follow up of atrial fibrillation, hypertension.  History of atrial fibrillation that was previously felt to be permanent and flecainide was stopped.  He was seen a few months ago in clinic by his general cardiologist and was found to be in sinus rhythm at that time.  He overall is unaware when he is in atrial fibrillation when he is in sinus rhythm.  He does not have any clear symptoms related to his atrial fibrillation.  He denies chest pain, shortness of breath, dyspnea on exertion, palpitations, orthopnea, PND, or lower extremity swelling.  He has had a significant amount of rectal bleeding, which potentially has been related to an anal fissure.  He is being evaluated by a GI doctor considering a colonoscopy for further evaluation of this.    Past Medical History:   Diagnosis Date   • Anal fissure    • Atrial fibrillation (HCC)    • Coronary artery disease    • Hydronephrosis 6/30/2020   • Hyperlipidemia    • Hypertension    • Prostate cancer (HCC)        Past Surgical History:   Procedure Laterality Date   • CARDIAC CATHETERIZATION     • CARDIAC SURGERY      cardiac loop implant   • HERNIA REPAIR         Family History   Problem Relation Age of Onset   • Kidney disease Mother    • Sudden death Other    • Cancer Other        Social History     Socioeconomic History   • Marital status:    Tobacco Use   • Smoking status: Never Smoker   • Smokeless tobacco: Never Used   Substance and Sexual Activity   • Alcohol use: No   •  "Drug use: No   • Sexual activity: Defer         Current Outpatient Medications:   •  amLODIPine (NORVASC) 5 MG tablet, TAKE ONE TABLET BY MOUTH DAILY, Disp: 90 tablet, Rfl: 0  •  apixaban (ELIQUIS) 5 MG tablet tablet, Take 1 tablet by mouth Every 12 (Twelve) Hours., Disp: 60 tablet, Rfl: 5  •  Bioflavonoid Products (SCOTT-C) 500-550 MG tablet, Take 1 tablet by mouth Daily., Disp: , Rfl:   •  Cholecalciferol (VITAMIN D3) 5000 UNITS capsule capsule, Take 5,000 Units by mouth Daily., Disp: , Rfl:   •  Darolutamide (Nubeqa) 300 MG tablet, Take 600 mg by mouth 2 (two) times a day., Disp: , Rfl:   •  folic acid (FOLVITE) 800 MCG tablet, Take 800 mcg by mouth daily., Disp: , Rfl:   •  levothyroxine (SYNTHROID, LEVOTHROID) 75 MCG tablet, Take 75 mcg by mouth Daily., Disp: , Rfl: 0  •  lisinopril (PRINIVIL,ZESTRIL) 5 MG tablet, Take 5 mg by mouth Daily., Disp: , Rfl:     Allergies:   No Known Allergies    Objective   Vital Signs: Blood pressure 122/72, pulse 65, height 180.3 cm (71\"), weight 100 kg (221 lb), SpO2 99 %.    PHYSICAL EXAM  General appearance: Awake, alert, cooperative  Head: Normocephalic, without obvious abnormality, atraumatic  Neck: No JVD  Lungs: Clear to ascultation bilaterally  Heart: Irregular rhythm, no murmurs, 2+ LE pulses, no lower extremity swelling  Skin: Skin color, turgor normal, no rashes or lesions  Neurologic: Grossly normal     Lab Results   Component Value Date    GLUCOSE 96 06/30/2020    CALCIUM 9.8 06/30/2020     (L) 06/30/2020    K 4.1 06/30/2020    CO2 27.0 06/30/2020    CL 89 (L) 06/30/2020    BUN 28 (H) 06/30/2020    CREATININE 1.13 06/30/2020    EGFRIFNONA 62 06/30/2020    BCR 24.8 06/30/2020    ANIONGAP 10.0 06/30/2020     Lab Results   Component Value Date    WBC 7.07 07/07/2021    HGB 11.0 (L) 07/07/2021    HCT 33.8 (L) 07/07/2021    MCV 96.3 07/07/2021     07/07/2021     No results found for: INR, PROTIME  No results found for: TSH, Y0KMPPR, P6KJEUL, THYROIDAB   "     Results for orders placed in visit on 12/20/18    SCANNED - ECHOCARDIOGRAM         Martín Campa  reports that he has never smoked. He has never used smokeless tobacco..    Advance Care Planning   Advance Care Planning: ACP discussion was held with the patient during this visit. Patient does not have an advance directive, information provided.     Assessment & Plan    1. Persistent atrial fibrillation (HCC)  He has rather persistent atrial fibrillation, even though he was detected to be in sinus rhythm at a prior visit a few months ago.  His heart rate was somewhat irregular today on exam so he likely is in atrial fibrillation today, although an ECG was not performed.  I discussed with him that as he does not overall seem to have any symptomatic difference between when he is in sinus rhythm and atrial fibrillation, continuing off of any rhythm control agent and focusing on a rate control strategy at this time would be reasonable.  He also has had an issue with rectal bleeding recently.  Currently on Eliquis.  We discussed about the potential candidacy for a watchman device given his history of bleeding and persistent atrial fibrillation.  We discussed that this would not prevent bleeding if there was a source of bleeding, but would be helpful in reducing the amount of bleeding since he would be able to stop anticoagulation.  I provided him additional information about this and he will think about it.  He also wants to discuss it further with his gastroenterologist.    2. Primary hypertension  Blood pressure is well controlled today.  We will continue his lisinopril.    3. Bradycardia  He has mild resting bradycardia that is asymptomatic.  We will continue to monitor.  No need for any intervention at this time.       Follow Up:  Return in about 1 year (around 10/15/2022) for Recheck.      Thank you for allowing me to participate in the care of your patient. Please do not hesitate to contact me with additional  questions or concerns.      Kameron Cantor M.D.  Cardiac Electrophysiologist  [facility]    I spent 37 minutes caring for Martín on this date of service. This time includes time spent by me in the following activities:preparing for the visit, reviewing tests, obtaining and/or reviewing a separately obtained history, performing a medically appropriate examination and/or evaluation , counseling and educating the patient/family/caregiver, ordering medications, tests, or procedures, referring and communicating with other health care professionals , documenting information in the medical record, independently interpreting results and communicating that information with the patient/family/caregiver, and care coordination.

## 2021-10-15 NOTE — PATIENT INSTRUCTIONS
Left Atrial Appendage Closure Device Implantation    Left atrial appendage (JAQUELINE) closure device implantation is a procedure that is done to place a small device in the JAQUELINE of the heart. The left atrium is one of the heart's two upper chambers, and the JAQUELINE is a small sac in the wall of the left atrium. The device closes the JAQUELINE.  This procedure can help prevent a stroke caused by atrial fibrillation. Atrial fibrillation is a type of irregular or rapid heartbeat (arrhythmia). When the heart does not beat normally and the heartbeat is irregular, there is an increased risk of blood clots and stroke. A blood clot can form in the JAQUELINE.  Tell a health care provider about:  · Any allergies you have.  · All medicines you are taking, including vitamins, herbs, eye drops, creams, and over-the-counter medicines.  · Any problems you or family members have had with anesthetic medicines.  · Any blood disorders you have.  · Any surgeries you have had.  · Any medical conditions you have.  · Whether you are pregnant or may be pregnant.  What are the risks?  Generally, this is a safe procedure. However, problems may occur, including:  · Infection.  · Bleeding.  · Allergic reactions to medicines or dyes.  · Damage to nearby structures or organs.  · Heart attack.  · Stroke.  · Blood clots.  · Changes in heart rhythm.  · Device failure.  What happens before the procedure?  Medicines  · Ask your health care provider about:  ? Changing or stopping your regular medicines. This is especially important if you are taking diabetes medicines or blood thinners.  ? Taking medicines such as aspirin and ibuprofen. These medicines can thin your blood. Do not take these medicines unless your health care provider tells you to take them.  ? Taking over-the-counter medicines, vitamins, herbs, and supplements.  · You may be given antibiotic medicine to help prevent an infection.  Staying hydrated  Follow instructions from your health care provider about  hydration, which may include:  · Up to 2 hours before the procedure - you may continue to drink clear liquids, such as water, clear fruit juice, black coffee, and plain tea.  Eating and drinking restrictions  Follow instructions from your health care provider about eating and drinking, which may include:  · 8 hours before the procedure - stop eating heavy meals or foods such as meat, fried foods, or fatty foods.  · 6 hours before the procedure - stop eating light meals or foods, such as toast or cereal.  · 6 hours before the procedure - stop drinking milk or drinks that contain milk.  · 2 hours before the procedure - stop drinking clear liquids.  General instructions  · Do not use any products that contain nicotine or tobacco, such as cigarettes and e-cigarettes. If you need help quitting, ask your health care provider.  · You will have blood tests and a physical exam.  · You will have a test called an electrocardiogram (ECG) to check your heart's electrical patterns and rhythms.  · You may be asked to shower with a germ-killing soap.  · Plan to have someone take you home from the hospital or clinic.  · Plan to have a responsible adult care for you for at least 24 hours after you leave the hospital or clinic. This is important.  What happens during the procedure?  · To lower your risk of infection:  ? Your health care team will wash or sanitize their hands.  ? Hair may be removed from the surgical area.  ? Your skin will be washed with soap.  · An IV will be inserted into one of your veins.  · You will be given one or more of the following:  ? A medicine to help you relax (sedative).  ? A medicine to make you fall asleep (general anesthetic).  · A small incision will be made in your groin area.  · A small wire will be put through the incision and into a blood vessel.  · X-ray dye may be injected so X-rays can be used to guide the wire through the blood vessel.  · A long, thin tube (catheter) will be put over the  small wire and moved up through the blood vessel to reach your heart.  · The closure device will be moved through the catheter until it reaches your heart.  · A small hole will be made in the septum (transseptal puncture). The septum is a thin tissue that separates the upper two chambers of the heart.  · The device will be placed so that it closes the JAQUELINE. X-rays will be done to make sure the device is in the right place.  · The catheter and wire will be removed. The closure device will remain in your heart.  · A bandage (dressing) will be placed over the site where the catheter was inserted. Pressure will be applied to prevent any bleeding.  The procedure may vary among health care providers and hospitals.  What happens after the procedure?  · Your blood pressure, heart rate, breathing rate, and blood oxygen level will be monitored until the medicines you were given have worn off.  · You may have to wear compression stockings. These stockings help to prevent blood clots and reduce swelling in your legs.  · Do not drive for 24 hours if you were given a sedative. Ask your health care provider when it is safe for you to drive.  · You may be given pain medicine.  · You may need to drink more fluids to wash (flush) the X-ray dye out of your body.  · Take over-the-counter and prescription medicines only as told by your health care provider. This is especially important if you were given blood thinners.  Summary  · Left atrial appendage (JAQUELINE) closure device implantation is a surgery that is done to put a small device in one of the heart's upper chambers (left atrium). The device is placed in a small sac in the wall of the left atrium (left atrial appendage).  · The device closes the JAQUELINE to prevent strokes and other problems.  · Follow instructions from your health care provider before and after the procedure.  This information is not intended to replace advice given to you by your health care provider. Make sure you  discuss any questions you have with your health care provider.  Document Revised: 12/04/2020 Document Reviewed: 05/01/2018  Elsevier Patient Education © 2021 [employer]      The brand name is the Watchman Device

## 2021-11-11 ENCOUNTER — TELEPHONE (OUTPATIENT)
Dept: CARDIOLOGY | Facility: CLINIC | Age: 86
End: 2021-11-11

## 2021-11-11 NOTE — TELEPHONE ENCOUNTER
Received cardiac clearance request from  stating pt has colonoscopy scheduled for 12/16/2021 and is requiring a cardiac clearance. Placed cardiac clearance request in Jackie's inbox to review and address with provider.

## 2021-11-22 NOTE — TELEPHONE ENCOUNTER
Clearance request faxed to Dr Asim Proctor through right fax for colonoscopy. Jackie Walters LPN

## 2022-02-18 DIAGNOSIS — I48.0 PAROXYSMAL ATRIAL FIBRILLATION: ICD-10-CM

## 2022-02-18 RX ORDER — APIXABAN 5 MG/1
TABLET, FILM COATED ORAL
Qty: 60 TABLET | Refills: 5 | Status: SHIPPED | OUTPATIENT
Start: 2022-02-18 | End: 2022-02-21

## 2022-02-21 DIAGNOSIS — I48.0 PAROXYSMAL ATRIAL FIBRILLATION: ICD-10-CM

## 2022-02-21 RX ORDER — APIXABAN 5 MG/1
TABLET, FILM COATED ORAL
Qty: 60 TABLET | Refills: 5 | Status: SHIPPED | OUTPATIENT
Start: 2022-02-21 | End: 2023-03-01

## 2022-03-14 ENCOUNTER — OFFICE VISIT (OUTPATIENT)
Dept: CARDIOLOGY | Facility: CLINIC | Age: 87
End: 2022-03-14

## 2022-03-14 VITALS
HEIGHT: 71 IN | OXYGEN SATURATION: 98 % | DIASTOLIC BLOOD PRESSURE: 53 MMHG | SYSTOLIC BLOOD PRESSURE: 138 MMHG | HEART RATE: 57 BPM | WEIGHT: 225.4 LBS | BODY MASS INDEX: 31.56 KG/M2

## 2022-03-14 DIAGNOSIS — I48.19 PERSISTENT ATRIAL FIBRILLATION: Primary | ICD-10-CM

## 2022-03-14 DIAGNOSIS — I10 ESSENTIAL HYPERTENSION: ICD-10-CM

## 2022-03-14 DIAGNOSIS — R06.02 SHORTNESS OF BREATH: ICD-10-CM

## 2022-03-14 PROCEDURE — 99213 OFFICE O/P EST LOW 20 MIN: CPT | Performed by: PHYSICIAN ASSISTANT

## 2022-03-14 NOTE — PROGRESS NOTES
Problem list     Subjective   Martín Campa is a 86 y.o. male     Chief Complaint   Patient presents with   • Follow-up     6 month    • Atrial Fibrillation   Problem List:  1. Atrial fibrillation with RVR during hospitalization, October 2014.  1.1 Status post cardioversion, December 2014.  1.2 Residual A flutter, despite increased dosing of Flecainide.  1.3 Repeat Cardioversion, 09/2016, with maintenance of SR with Flecainide.  The patient has recently been discontinued from flecainide by EP services, per his report.  1.4 no recommendation for rate control given recurrent bradycardia dysrhythmic activity.  The patient is followed closely through EP services for the same at this time.  2. 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 Hypertension  4. Dyslipidemia      5. Prostate cancer      6. First degree AVB    HPI  The patient presents into the clinic today for routine evaluation and follow-up.  Strictly from cardiovascular standpoint, the patient has done reasonably well since last evaluation.  He has no symptoms of dysrhythmias.  The patient reports no chest pain.  He has chronic but stable dyspnea and fatigue.  He denies failure symptoms.  He typically is normotensive when checked at home.  His concern at this time is with recurrent rectal bleeding, initially felt secondary to a fissure.  His concern is with ongoing anticoagulation.  He would like to discuss further evaluation with EP services in the setting of chronic anticoagulation, specifically wanting to discuss consideration for left atrial appendage occluder.    Current Outpatient Medications on File Prior to Visit   Medication Sig Dispense Refill   • amLODIPine (NORVASC) 5 MG tablet TAKE ONE TABLET BY MOUTH DAILY 90 tablet 0   • Cholecalciferol (VITAMIN D3) 5000 UNITS capsule capsule Take 5,000 Units by mouth Daily.     • Darolutamide (Nubeqa) 300 MG tablet Take 600 mg by mouth 2 (two) times a day.     • Eliquis 5 MG tablet tablet TAKE ONE TABLET BY  MOUTH EVERY 12 HOURS 60 tablet 5   • folic acid (FOLVITE) 800 MCG tablet Take 800 mcg by mouth daily.     • levothyroxine (SYNTHROID, LEVOTHROID) 75 MCG tablet Take 75 mcg by mouth Daily.  0   • lisinopril (PRINIVIL,ZESTRIL) 5 MG tablet Take 5 mg by mouth Daily.     • TRIAMTERENE PO Take 25 mg by mouth.       No current facility-administered medications on file prior to visit.       Patient has no known allergies.    Past Medical History:   Diagnosis Date   • Anal fissure    • Atrial fibrillation (HCC)    • Coronary artery disease    • Hydronephrosis 6/30/2020   • Hyperlipidemia    • Hypertension    • Prostate cancer (HCC)        Social History     Socioeconomic History   • Marital status:    Tobacco Use   • Smoking status: Never Smoker   • Smokeless tobacco: Never Used   Substance and Sexual Activity   • Alcohol use: No   • Drug use: No   • Sexual activity: Defer       Family History   Problem Relation Age of Onset   • Kidney disease Mother    • Sudden death Other    • Cancer Other        Review of Systems   Constitutional: Positive for fatigue. Negative for chills and fever.   HENT: Positive for congestion and rhinorrhea. Negative for sore throat.    Eyes: Positive for visual disturbance (reading glasses ).   Respiratory: Positive for shortness of breath. Negative for chest tightness and wheezing.    Cardiovascular: Positive for leg swelling. Negative for chest pain and palpitations.   Gastrointestinal: Negative.    Endocrine: Negative.    Genitourinary: Negative.    Musculoskeletal: Positive for back pain. Negative for arthralgias and neck pain.   Skin: Negative.  Negative for rash and wound.   Allergic/Immunologic: Negative.  Negative for environmental allergies.   Neurological: Positive for dizziness. Negative for weakness, numbness and headaches.   Hematological: Bruises/bleeds easily (bruises / bleeds).   Psychiatric/Behavioral: Negative.  Negative for sleep disturbance.       Objective   Vitals:     "22 1106   BP: 138/53   BP Location: Left arm   Patient Position: Sitting   Pulse: 57   SpO2: 98%   Weight: 102 kg (225 lb 6.4 oz)   Height: 180.3 cm (71\")      /53 (BP Location: Left arm, Patient Position: Sitting)   Pulse 57   Ht 180.3 cm (71\")   Wt 102 kg (225 lb 6.4 oz)   SpO2 98%   BMI 31.44 kg/m²    Lab Results (most recent)     None        Physical Exam  Vitals and nursing note reviewed.   Constitutional:       General: He is not in acute distress.     Appearance: He is well-developed.   HENT:      Head: Normocephalic and atraumatic.   Eyes:      Conjunctiva/sclera: Conjunctivae normal.      Pupils: Pupils are equal, round, and reactive to light.   Neck:      Vascular: No JVD.      Trachea: No tracheal deviation.   Cardiovascular:      Rate and Rhythm: Normal rate. Rhythm irregular.      Heart sounds: Murmur heard.    Systolic (LSB) murmur is present with a grade of 1/6.  Pulmonary:      Effort: Pulmonary effort is normal.      Breath sounds: Normal breath sounds.   Abdominal:      General: Bowel sounds are normal. There is no distension.      Palpations: Abdomen is soft. There is no mass.      Tenderness: There is no abdominal tenderness. There is no guarding or rebound.   Musculoskeletal:         General: No tenderness or deformity. Normal range of motion.      Cervical back: Normal range of motion and neck supple.      Right lower le+ Edema present.      Left lower le+ Edema present.   Skin:     General: Skin is warm and dry.      Coloration: Skin is not pale.      Findings: No erythema or rash.   Neurological:      Mental Status: He is alert and oriented to person, place, and time.   Psychiatric:         Behavior: Behavior normal.         Thought Content: Thought content normal.         Judgment: Judgment normal.           Procedure   Procedures       Assessment/Plan      Diagnosis Plan   1. Persistent atrial fibrillation (HCC)     2. Essential hypertension     3. Shortness of " breath       1.  The patient would like a referral back to electrophysiology services.  He wants to discuss consideration for left atrial appendage occluder.  We have sent a message to his previous EP provider, Dr. Liu, regarding the same.  We will await their recommendations.    2.  Otherwise, he really has no dysrhythmic symptoms.  We will continue anticoagulation for CVA risk reduction given A. fib at this time.  Flecainide has been discontinued.  There is no need for rate control medication at this time, particularly given previous bradycardia dysrhythmic activity.  We will monitor that closely.    3.  His dyspnea is at baseline.  He has no further cardiac issues at this time.  No further evaluation is warranted from general cardiovascular standpoint.    4.  He will monitor blood pressures closely at home and call to us for any issues.  Nothing further and we will see him on 6-month intervals.              Advance Care Planning   ACP discussion was held with the patient during this visit. Patient has an advance directive (not in EMR), copy requested.          Electronically signed by:

## 2022-09-01 DIAGNOSIS — I10 ESSENTIAL HYPERTENSION: ICD-10-CM

## 2022-09-01 RX ORDER — AMLODIPINE BESYLATE 5 MG/1
TABLET ORAL
Qty: 90 TABLET | Refills: 0 | Status: SHIPPED | OUTPATIENT
Start: 2022-09-01 | End: 2022-10-13 | Stop reason: ALTCHOICE

## 2022-09-20 ENCOUNTER — OFFICE VISIT (OUTPATIENT)
Dept: CARDIOLOGY | Facility: CLINIC | Age: 87
End: 2022-09-20

## 2022-09-20 VITALS
DIASTOLIC BLOOD PRESSURE: 65 MMHG | OXYGEN SATURATION: 99 % | BODY MASS INDEX: 30.57 KG/M2 | HEIGHT: 71 IN | HEART RATE: 64 BPM | WEIGHT: 218.4 LBS | SYSTOLIC BLOOD PRESSURE: 133 MMHG

## 2022-09-20 DIAGNOSIS — R06.02 SHORTNESS OF BREATH: ICD-10-CM

## 2022-09-20 DIAGNOSIS — R55 SYNCOPE, UNSPECIFIED SYNCOPE TYPE: ICD-10-CM

## 2022-09-20 DIAGNOSIS — I48.91 ATRIAL FIBRILLATION, UNSPECIFIED TYPE: Primary | ICD-10-CM

## 2022-09-20 PROCEDURE — 99213 OFFICE O/P EST LOW 20 MIN: CPT | Performed by: PHYSICIAN ASSISTANT

## 2022-09-20 RX ORDER — PREDNISONE 1 MG/1
5 TABLET ORAL 2 TIMES DAILY
COMMUNITY

## 2022-09-20 RX ORDER — ABIRATERONE ACETATE 250 MG/1
TABLET ORAL DAILY
COMMUNITY

## 2022-09-20 NOTE — PROGRESS NOTES
Problem list     Subjective   Martín Campa is a 87 y.o. male     Chief Complaint   Patient presents with   • Follow-up     6mth/[facility] ER   Problem List:  1. Atrial fibrillation with RVR during hospitalization, October 2014.  1.1 Status post cardioversion, December 2014.  1.2 Residual A flutter, despite increased dosing of Flecainide.  1.3 Repeat Cardioversion, 09/2016, with maintenance of SR with Flecainide.  The patient has recently been discontinued from flecainide by EP services, per his report.  1.4 no recommendation for rate control given recurrent bradycardia dysrhythmic activity.  The patient is followed closely through EP services for the same at this time.  2. 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 Hypertension  4. Dyslipidemia      5. Prostate cancer      6. First degree AVB    HPI  The patient presents in the clinic today for routine follow-up.  He was hospitalized recently following altered level of consciousness.  He is apparently had dizziness with presyncope and eventual syncope.  This patient had confusion.  He eventually was admitted and felt to have community-acquired pneumonia.  He had associated SIADH.  He was treated appropriately and eventually released in stable condition.  During hospitalization, the patient did have an echocardiogram.  Echocardiogram supported preserved systolic function, questionable mild asymmetric septal hypertrophy, and no significant valvular lesions noted.  Work-up otherwise was apparently benign.  The patient is done well since discharge.  He still very weak.  He is really had no significant cardiovascular issues.  He does have palpitations.  He has had intermittent dizziness but really no syncope since.  He has no chest pain of any significance.  He has no significant dyspnea outside of baseline.  He has no further complaints.    Current Outpatient Medications on File Prior to Visit   Medication Sig Dispense Refill   • abiraterone acetate (ZYTIGA) 250 MG  "chemo tablet Take  by mouth Daily.     • amLODIPine (NORVASC) 5 MG tablet TAKE ONE TABLET BY MOUTH DAILY (Patient taking differently: 10 mg.) 90 tablet 0   • Cholecalciferol (VITAMIN D3) 5000 UNITS capsule capsule Take 5,000 Units by mouth Daily.     • Eliquis 5 MG tablet tablet TAKE ONE TABLET BY MOUTH EVERY 12 HOURS 60 tablet 5   • folic acid (FOLVITE) 800 MCG tablet Take 800 mcg by mouth daily.     • levothyroxine (SYNTHROID, LEVOTHROID) 75 MCG tablet Take 75 mcg by mouth Daily.  0   • lisinopril (PRINIVIL,ZESTRIL) 5 MG tablet Take 5 mg by mouth Daily.     • predniSONE (DELTASONE) 5 MG tablet Take 5 mg by mouth 2 (Two) Times a Day.     • Darolutamide (Nubeqa) 300 MG tablet Take 600 mg by mouth 2 (two) times a day.     • TRIAMTERENE PO Take 25 mg by mouth.       No current facility-administered medications on file prior to visit.       Patient has no known allergies.    Past Medical History:   Diagnosis Date   • Anal fissure    • Atrial fibrillation (HCC)    • Coronary artery disease    • Hydronephrosis 6/30/2020   • Hyperlipidemia    • Hypertension    • Prostate cancer (HCC)        Social History     Socioeconomic History   • Marital status:    Tobacco Use   • Smoking status: Never Smoker   • Smokeless tobacco: Never Used   Substance and Sexual Activity   • Alcohol use: No   • Drug use: No   • Sexual activity: Defer       Family History   Problem Relation Age of Onset   • Kidney disease Mother    • Sudden death Other    • Cancer Other        Review of Systems   Constitutional: Positive for fatigue (Oncology pill and multiple trips to ER).   HENT: Positive for rhinorrhea (Mainly in the am ). Negative for congestion and sore throat.    Eyes: Positive for visual disturbance (Reading glasses PRN).   Respiratory: Positive for shortness of breath (Recovering from pneumonia ). Negative for chest tightness.    Cardiovascular: Positive for leg swelling (\"slight\" BLE edema- goes down overnight ). Negative for chest " "pain and palpitations.   Gastrointestinal: Positive for blood in stool (Off and on since starting Eliquis ) and vomiting (Oncology pill ). Negative for abdominal pain, constipation, diarrhea and nausea.   Endocrine: Negative.  Negative for cold intolerance and heat intolerance.   Genitourinary: Negative for difficulty urinating, dysuria, frequency, hematuria and urgency.        Has a bag   Musculoskeletal: Positive for back pain (Worsened from recent fall ) and gait problem (Ambulates w/ cane ). Negative for arthralgias and neck pain.   Skin: Negative for rash and wound.        Bruises up from a recent fall    Allergic/Immunologic: Negative.  Negative for environmental allergies and food allergies.   Neurological: Positive for syncope (Two episodes, went to [facility] and another episode, then went to [facility]) and weakness. Negative for dizziness, light-headedness, numbness and headaches.   Hematological: Bruises/bleeds easily (Eliquis ).   Psychiatric/Behavioral: Negative.  Negative for sleep disturbance.       Objective   Vitals:    09/20/22 1013   BP: 133/65   Pulse: 64   SpO2: 99%   Weight: 99.1 kg (218 lb 6.4 oz)   Height: 180.3 cm (71\")      /65   Pulse 64   Ht 180.3 cm (71\")   Wt 99.1 kg (218 lb 6.4 oz)   SpO2 99%   BMI 30.46 kg/m²    Lab Results (most recent)     None        Physical Exam  Vitals and nursing note reviewed.   Constitutional:       General: He is not in acute distress.     Appearance: He is well-developed.   HENT:      Head: Normocephalic and atraumatic.   Eyes:      Conjunctiva/sclera: Conjunctivae normal.      Pupils: Pupils are equal, round, and reactive to light.   Neck:      Vascular: No JVD.      Trachea: No tracheal deviation.   Cardiovascular:      Rate and Rhythm: Normal rate and regular rhythm.      Heart sounds: Murmur heard.    Systolic (LSB) murmur is present with a grade of 1/6.  Pulmonary:      Effort: Pulmonary effort is normal.      Breath sounds: Normal breath sounds. "   Abdominal:      General: Bowel sounds are normal. There is no distension.      Palpations: Abdomen is soft. There is no mass.      Tenderness: There is no abdominal tenderness. There is no guarding or rebound.   Musculoskeletal:         General: No tenderness or deformity. Normal range of motion.      Cervical back: Normal range of motion and neck supple.      Right lower le+ Edema present.      Left lower le+ Edema present.   Skin:     General: Skin is warm and dry.      Coloration: Skin is not pale.      Findings: No erythema or rash.   Neurological:      Mental Status: He is alert and oriented to person, place, and time.   Psychiatric:         Behavior: Behavior normal.         Thought Content: Thought content normal.         Judgment: Judgment normal.           Procedure   Procedures       Assessment & Plan      Diagnosis Plan   1. Atrial fibrillation, unspecified type (HCC)  Cardiac Event Monitor   2. Shortness of breath  Cardiac Event Monitor   3. Syncope, unspecified syncope type  Cardiac Event Monitor       1.  The patient presents today primarily for routine follow-up, but also for hospital follow-up as well.  The patient was recently hospitalized following apparent syncope.  He was eventually diagnosed with community-acquired pneumonia and was advised that his syncopal episode was related to the same.  He still has ongoing dizziness however.  With clinical history, and scenario otherwise, I feel that we need to schedule for an event monitor.  We will schedule that x14 days.    2.  His recent echo during hospitalization was very much at baseline.  I do not feel we need to repeat that.    3.  Pending what we find on event monitor, we may well consider nuclear stress test just for risk stratification.  I will review that first.    4.  We will continue medications for now without change.  He appears to be doing reasonably well from general cardiovascular standpoint.  He will report back for any  change.  Further pending above.         Advance Care Planning   ACP discussion was declined by the patient. Patient has an advance directive (not in EMR), copy requested.     Patient brought in medicine list to appointment, it's been reviewed with patient and med list was updated in the chart.         Electronically signed by:

## 2022-09-26 ENCOUNTER — TELEPHONE (OUTPATIENT)
Dept: CARDIOLOGY | Facility: CLINIC | Age: 87
End: 2022-09-26

## 2022-09-26 DIAGNOSIS — R07.89 CHEST PAIN, ATYPICAL: ICD-10-CM

## 2022-09-26 DIAGNOSIS — I48.92 ATRIAL FLUTTER, UNSPECIFIED TYPE: ICD-10-CM

## 2022-09-26 DIAGNOSIS — R53.83 FATIGUE, UNSPECIFIED TYPE: ICD-10-CM

## 2022-09-26 DIAGNOSIS — I48.0 PAROXYSMAL ATRIAL FIBRILLATION: Primary | ICD-10-CM

## 2022-09-26 NOTE — TELEPHONE ENCOUNTER
Left detailed mess for pt regarding Cardiac Report day # 3 -    DX - NSVT per JESS Sanz pt will need to have Stress Test ( Nuclear Shaina Scan ) if pt is willing we will need to order testing for pt     CHANTE Camacho     Pt returned call and states that he will proceed with Stress test . Testing ordered     CHANTE Camacho

## 2022-10-03 ENCOUNTER — HOSPITAL ENCOUNTER (OUTPATIENT)
Dept: CARDIOLOGY | Facility: HOSPITAL | Age: 87
Discharge: HOME | End: 2022-10-03

## 2022-10-03 DIAGNOSIS — R07.89 CHEST PAIN, ATYPICAL: ICD-10-CM

## 2022-10-03 DIAGNOSIS — R53.83 FATIGUE, UNSPECIFIED TYPE: ICD-10-CM

## 2022-10-03 DIAGNOSIS — I48.0 PAROXYSMAL ATRIAL FIBRILLATION: ICD-10-CM

## 2022-10-03 DIAGNOSIS — I48.92 ATRIAL FLUTTER, UNSPECIFIED TYPE: ICD-10-CM

## 2022-10-03 LAB
BH CV REST NUCLEAR ISOTOPE DOSE: 10 MCI
BH CV STRESS COMMENTS STAGE 1: NORMAL
BH CV STRESS DOSE REGADENOSON STAGE 1: 0.4
BH CV STRESS DURATION MIN STAGE 1: 0
BH CV STRESS DURATION SEC STAGE 1: 10
BH CV STRESS NUCLEAR ISOTOPE DOSE: 30 MCI
BH CV STRESS PROTOCOL 1: NORMAL
BH CV STRESS RECOVERY BP: NORMAL MMHG
BH CV STRESS RECOVERY HR: 66 BPM
BH CV STRESS STAGE 1: 1
MAXIMAL PREDICTED HEART RATE: 135 BPM
PERCENT MAX PREDICTED HR: 48.15 %
STRESS BASELINE BP: NORMAL MMHG
STRESS BASELINE HR: 54 BPM
STRESS PERCENT HR: 57 %
STRESS POST PEAK BP: NORMAL MMHG
STRESS POST PEAK HR: 65 BPM
STRESS TARGET HR: 115 BPM

## 2022-10-03 PROCEDURE — 78452 HT MUSCLE IMAGE SPECT MULT: CPT

## 2022-10-03 PROCEDURE — A9500 TC99M SESTAMIBI: HCPCS | Performed by: INTERNAL MEDICINE

## 2022-10-03 PROCEDURE — 0 TECHNETIUM SESTAMIBI: Performed by: INTERNAL MEDICINE

## 2022-10-03 PROCEDURE — 25010000002 REGADENOSON 0.4 MG/5ML SOLUTION: Performed by: INTERNAL MEDICINE

## 2022-10-03 PROCEDURE — 93017 CV STRESS TEST TRACING ONLY: CPT

## 2022-10-03 PROCEDURE — 78452 HT MUSCLE IMAGE SPECT MULT: CPT | Performed by: INTERNAL MEDICINE

## 2022-10-03 PROCEDURE — 93018 CV STRESS TEST I&R ONLY: CPT | Performed by: INTERNAL MEDICINE

## 2022-10-03 RX ADMIN — TECHNETIUM TC 99M SESTAMIBI 1 DOSE: 1 INJECTION INTRAVENOUS at 08:24

## 2022-10-03 RX ADMIN — REGADENOSON 0.4 MG: 0.08 INJECTION, SOLUTION INTRAVENOUS at 10:09

## 2022-10-03 RX ADMIN — TECHNETIUM TC 99M SESTAMIBI 1 DOSE: 1 INJECTION INTRAVENOUS at 10:09

## 2022-10-13 ENCOUNTER — OFFICE VISIT (OUTPATIENT)
Dept: CARDIOLOGY | Facility: CLINIC | Age: 87
End: 2022-10-13

## 2022-10-13 VITALS
DIASTOLIC BLOOD PRESSURE: 66 MMHG | BODY MASS INDEX: 32.9 KG/M2 | WEIGHT: 235 LBS | HEART RATE: 62 BPM | HEIGHT: 71 IN | SYSTOLIC BLOOD PRESSURE: 152 MMHG | OXYGEN SATURATION: 99 %

## 2022-10-13 DIAGNOSIS — I47.29 NONSUSTAINED VENTRICULAR TACHYCARDIA: ICD-10-CM

## 2022-10-13 DIAGNOSIS — R07.9 CHEST PAIN, UNSPECIFIED TYPE: Primary | ICD-10-CM

## 2022-10-13 DIAGNOSIS — I48.20 ATRIAL FIBRILLATION, CHRONIC: ICD-10-CM

## 2022-10-13 PROBLEM — C61 PROSTATE CANCER METASTATIC TO BONE: Status: ACTIVE | Noted: 2022-08-09

## 2022-10-13 PROBLEM — C79.51 PROSTATE CANCER METASTATIC TO BONE (HCC): Status: ACTIVE | Noted: 2022-08-09

## 2022-10-13 PROCEDURE — 99214 OFFICE O/P EST MOD 30 MIN: CPT | Performed by: PHYSICIAN ASSISTANT

## 2022-10-13 RX ORDER — MULTIVIT-MIN/IRON/FOLIC ACID/K 18-600-40
2 CAPSULE ORAL DAILY
COMMUNITY

## 2022-10-13 RX ORDER — NITROGLYCERIN 0.4 MG/1
TABLET SUBLINGUAL
Qty: 25 TABLET | Refills: 11 | Status: SHIPPED | OUTPATIENT
Start: 2022-10-13

## 2022-10-13 RX ORDER — LISINOPRIL 10 MG/1
10 TABLET ORAL DAILY
Qty: 30 TABLET | Refills: 5 | Status: SHIPPED | OUTPATIENT
Start: 2022-10-13 | End: 2023-01-30 | Stop reason: SDUPTHER

## 2022-10-13 RX ORDER — ATORVASTATIN CALCIUM 10 MG/1
10 TABLET, FILM COATED ORAL DAILY
Qty: 30 TABLET | Refills: 11 | Status: SHIPPED | OUTPATIENT
Start: 2022-10-13 | End: 2023-04-03

## 2022-10-13 RX ORDER — FUROSEMIDE 40 MG/1
40 TABLET ORAL DAILY
Qty: 30 TABLET | Refills: 11 | Status: SHIPPED | OUTPATIENT
Start: 2022-10-13

## 2022-10-13 RX ORDER — AMLODIPINE BESYLATE 5 MG/1
5 TABLET ORAL DAILY
Qty: 30 TABLET | Refills: 11 | Status: SHIPPED | OUTPATIENT
Start: 2022-10-13 | End: 2023-04-03 | Stop reason: ALTCHOICE

## 2022-10-13 RX ORDER — POTASSIUM CHLORIDE 750 MG/1
10 TABLET, FILM COATED, EXTENDED RELEASE ORAL DAILY
Qty: 30 TABLET | Refills: 11 | Status: SHIPPED | OUTPATIENT
Start: 2022-10-13

## 2022-10-13 RX ORDER — AMLODIPINE BESYLATE 10 MG/1
10 TABLET ORAL DAILY
COMMUNITY
End: 2022-10-13

## 2022-10-13 NOTE — PROGRESS NOTES
Subjective   Martín Campa is a 87 y.o. male     Chief Complaint   Patient presents with   • Testing follow up       Problem list   1. Atrial fibrillation with RVR during hospitalization, October 2014.  1.1 Status post cardioversion, December 2014.  1.2 Residual A flutter, despite increased dosing of Flecainide.  1.3 Repeat Cardioversion, 09/2016, with maintenance of SR with Flecainide.  The patient has recently been discontinued from flecainide by EP services, per his report.  1.4 no recommendation for rate control given recurrent bradycardia dysrhythmic activity.  The patient is followed closely through EP services for the same at this time.  1.5 repeat event monitor October 2022 demonstrating chronic atrial fibrillation but multiple runs of nonsustained V. tach.  Slow ventricular response rates were noted during the study as well  2. Nonobstructive CAD by cath, January 2005   2.1 stress test October 2022 with a large lateral wall ischemic defect and preserved LV function noted  3. Hypertension  4. Dyslipidemia      5. Prostate cancer      6. First degree AVB     HPI      Patient is a 87-year-old male who presents back to the office for follow-up.  Patient underwent a stress test.  He also had event monitor that was ordered.    Patient is followed by EP services and event monitor to demonstrate slow ventricular response rates as well as what appears to be chronic A. fib but multiple runs of nonsustained V. tach    Patient will feel palpitations but describes an uncomfortable sensation in his chest.  He will feel this in the substernal region.  This happens on occasion but is still quite noticeable and significant at times.  He does not describe taking nitroglycerin to resolve it.    He has no complaints of any dyspnea at all.  No PND or orthopnea.    He has significant lower extremity edema.  Patient has noticed that over period of several weeks and had venous duplex bilaterally excluding DVT.  He has been  treated with medication for prostate cancer that he describes can cause lower extremity edema but he also is on higher dose of amlodipine.    He occasionally will have dizziness.  He does not describe any presyncope or syncope.  He appears to be stable otherwise                Current Outpatient Medications on File Prior to Visit   Medication Sig Dispense Refill   • abiraterone acetate (ZYTIGA) 250 MG chemo tablet Take  by mouth Daily. 4 tablets in the morning     • Ascorbic Acid (Vitamin C) 500 MG capsule Take  by mouth. 2 daily     • Cholecalciferol (VITAMIN D3) 5000 UNITS capsule capsule Take 5,000 Units by mouth Daily.     • Eliquis 5 MG tablet tablet TAKE ONE TABLET BY MOUTH EVERY 12 HOURS 60 tablet 5   • folic acid (FOLVITE) 800 MCG tablet Take 800 mcg by mouth daily.     • levothyroxine (SYNTHROID, LEVOTHROID) 75 MCG tablet Take 75 mcg by mouth Daily.  0   • predniSONE (DELTASONE) 5 MG tablet Take 5 mg by mouth 2 (Two) Times a Day.     • [DISCONTINUED] amLODIPine (NORVASC) 10 MG tablet Take 1 tablet by mouth Daily.     • [DISCONTINUED] lisinopril (PRINIVIL,ZESTRIL) 5 MG tablet Take 5 mg by mouth Daily.     • [DISCONTINUED] amLODIPine (NORVASC) 5 MG tablet TAKE ONE TABLET BY MOUTH DAILY (Patient taking differently: 2 tablets.) 90 tablet 0   • [DISCONTINUED] Darolutamide (Nubeqa) 300 MG tablet Take 600 mg by mouth 2 (two) times a day.     • [DISCONTINUED] TRIAMTERENE PO Take 25 mg by mouth.       No current facility-administered medications on file prior to visit.       Patient has no known allergies.    Past Medical History:   Diagnosis Date   • Anal fissure    • Atrial fibrillation (HCC)    • Coronary artery disease    • Hydronephrosis 06/30/2020   • Hyperlipidemia    • Hypertension    • Prostate cancer (HCC)        Social History     Socioeconomic History   • Marital status:    Tobacco Use   • Smoking status: Never   • Smokeless tobacco: Never   Substance and Sexual Activity   • Alcohol use: No   •  "Drug use: No   • Sexual activity: Defer       Family History   Problem Relation Age of Onset   • Kidney disease Mother    • Sudden death Other    • Cancer Other        Review of Systems   Constitutional: Negative.  Negative for chills and fever.   HENT: Negative.  Negative for congestion and sinus pressure.    Respiratory: Negative.  Negative for chest tightness and shortness of breath.    Cardiovascular: Positive for chest pain (odd feeling at times, tight at times) and leg swelling. Negative for palpitations.   Gastrointestinal: Negative.  Negative for abdominal pain, blood in stool, constipation, diarrhea, nausea and vomiting.   Endocrine: Negative.  Negative for cold intolerance and heat intolerance.   Genitourinary: Negative.  Negative for dysuria, frequency, hematuria and urgency.        Reports has catheter   Musculoskeletal: Positive for gait problem (reports bruised ribs on left side from recent fall).   Skin: Positive for wound (from fall).   Neurological: Positive for dizziness (dizzy at times, report recent stay in [facility] due UTI and pneumonia, low sodium). Negative for syncope and light-headedness.   Psychiatric/Behavioral: Negative.  Negative for sleep disturbance (denies waking with soa or cp).       Objective   Vitals:    10/13/22 0841   BP: 152/66   BP Location: Left arm   Patient Position: Sitting   Pulse: 62   SpO2: 99%   Weight: 107 kg (235 lb)   Height: 180.3 cm (71\")      /66 (BP Location: Left arm, Patient Position: Sitting)   Pulse 62   Ht 180.3 cm (71\")   Wt 107 kg (235 lb)   SpO2 99%   BMI 32.78 kg/m²     Lab Results (most recent)     None          Physical Exam  Vitals and nursing note reviewed.   Constitutional:       General: He is not in acute distress.     Appearance: Normal appearance. He is well-developed.   HENT:      Head: Normocephalic and atraumatic.   Eyes:      General: No scleral icterus.        Right eye: No discharge.         Left eye: No discharge.      " Conjunctiva/sclera: Conjunctivae normal.   Neck:      Vascular: No carotid bruit.   Cardiovascular:      Rate and Rhythm: Normal rate and regular rhythm.      Heart sounds: Normal heart sounds. No murmur heard.    No friction rub. No gallop.   Pulmonary:      Effort: Pulmonary effort is normal. No respiratory distress.      Breath sounds: Normal breath sounds. No wheezing or rales.   Chest:      Chest wall: No tenderness.   Musculoskeletal:      Right lower leg: 3+ Edema present.   Skin:     General: Skin is warm and dry.      Coloration: Skin is not pale.      Findings: No erythema or rash.   Neurological:      Mental Status: He is alert and oriented to person, place, and time.      Cranial Nerves: No cranial nerve deficit.   Psychiatric:         Behavior: Behavior normal.         Procedure   Procedures       Assessment & Plan     Problems Addressed this Visit    None  Visit Diagnoses     Chest pain, unspecified type    -  Primary    Relevant Orders    Basic Metabolic Panel    Case Request Cath Lab: Coronary angiography (Completed)    Nonsustained ventricular tachycardia        Relevant Medications    amLODIPine (NORVASC) 5 MG tablet    nitroglycerin (NITROSTAT) 0.4 MG SL tablet    Other Relevant Orders    Basic Metabolic Panel    Case Request Cath Lab: Coronary angiography (Completed)    Atrial fibrillation, chronic (HCC)        Relevant Medications    amLODIPine (NORVASC) 5 MG tablet    nitroglycerin (NITROSTAT) 0.4 MG SL tablet    Other Relevant Orders    Basic Metabolic Panel    Case Request Cath Lab: Coronary angiography (Completed)      Diagnoses       Codes Comments    Chest pain, unspecified type    -  Primary ICD-10-CM: R07.9  ICD-9-CM: 786.50     Nonsustained ventricular tachycardia     ICD-10-CM: I47.29  ICD-9-CM: 427.1     Atrial fibrillation, chronic (HCC)     ICD-10-CM: I48.20  ICD-9-CM: 427.31       Recommendation  1.  Patient is a 89-year-old male who presents back to the office for follow-up with  complaints of chest discomfort with a large lateral wall ischemic defect.  He has had multiple runs of nonsustained V. tach on event monitor with history of coronary disease.  I feel that catheterization is warranted on an expedited fashion.  Patient would like to have this done in [place].  We will try to arrange with Dr. Saha.    2.  In regards to patient's atrial fibrillation and nonsustained V. tach, I am concerned his V. tach might be secondary to ischemia hence the expedited catheterization.  However, he has what appears to be chronic A. fib with varying rates but significant slow ventricular response rates at times.  He has occasional dizziness but no symptoms of presyncope/syncope.  He is to see EP services next week.  I will defer any adjustment of medical therapy to them at this time.    3.  I am placing patient on Lasix 40 mg.  I am placing him on 10 mill equivalents of potassium and I want to check labs in 1 week.  I am increasing lisinopril and decreasing amlodipine which I think will help with lower extremity edema as well.  We may need to stop this medication in the future if his edema does not improve.  It is quite significant on examination    4.  I am prescribing nitroglycerin as needed for chest pain.  We will see patient back for follow-up after catheterization.  He will see EP services next week.  Follow-up with primary as scheduled               Martín Campa  reports that he has never smoked. He has never used smokeless tobacco..     Patient brought in medicine list to appointment, it's been reviewed with patient and med list was updated in the chart.     Advance Care Planning   ACP discussion was held with the patient during this visit. Patient has an advance directive in EMR which is still valid.           Electronically signed by:

## 2022-10-13 NOTE — H&P (VIEW-ONLY)
Subjective   Martín Campa is a 87 y.o. male     Chief Complaint   Patient presents with   • Testing follow up       Problem list   1. Atrial fibrillation with RVR during hospitalization, October 2014.  1.1 Status post cardioversion, December 2014.  1.2 Residual A flutter, despite increased dosing of Flecainide.  1.3 Repeat Cardioversion, 09/2016, with maintenance of SR with Flecainide.  The patient has recently been discontinued from flecainide by EP services, per his report.  1.4 no recommendation for rate control given recurrent bradycardia dysrhythmic activity.  The patient is followed closely through EP services for the same at this time.  1.5 repeat event monitor October 2022 demonstrating chronic atrial fibrillation but multiple runs of nonsustained V. tach.  Slow ventricular response rates were noted during the study as well  2. Nonobstructive CAD by cath, January 2005   2.1 stress test October 2022 with a large lateral wall ischemic defect and preserved LV function noted  3. Hypertension  4. Dyslipidemia      5. Prostate cancer      6. First degree AVB     HPI      Patient is a 87-year-old male who presents back to the office for follow-up.  Patient underwent a stress test.  He also had event monitor that was ordered.    Patient is followed by EP services and event monitor to demonstrate slow ventricular response rates as well as what appears to be chronic A. fib but multiple runs of nonsustained V. tach    Patient will feel palpitations but describes an uncomfortable sensation in his chest.  He will feel this in the substernal region.  This happens on occasion but is still quite noticeable and significant at times.  He does not describe taking nitroglycerin to resolve it.    He has no complaints of any dyspnea at all.  No PND or orthopnea.    He has significant lower extremity edema.  Patient has noticed that over period of several weeks and had venous duplex bilaterally excluding DVT.  He has been  treated with medication for prostate cancer that he describes can cause lower extremity edema but he also is on higher dose of amlodipine.    He occasionally will have dizziness.  He does not describe any presyncope or syncope.  He appears to be stable otherwise                Current Outpatient Medications on File Prior to Visit   Medication Sig Dispense Refill   • abiraterone acetate (ZYTIGA) 250 MG chemo tablet Take  by mouth Daily. 4 tablets in the morning     • Ascorbic Acid (Vitamin C) 500 MG capsule Take  by mouth. 2 daily     • Cholecalciferol (VITAMIN D3) 5000 UNITS capsule capsule Take 5,000 Units by mouth Daily.     • Eliquis 5 MG tablet tablet TAKE ONE TABLET BY MOUTH EVERY 12 HOURS 60 tablet 5   • folic acid (FOLVITE) 800 MCG tablet Take 800 mcg by mouth daily.     • levothyroxine (SYNTHROID, LEVOTHROID) 75 MCG tablet Take 75 mcg by mouth Daily.  0   • predniSONE (DELTASONE) 5 MG tablet Take 5 mg by mouth 2 (Two) Times a Day.     • [DISCONTINUED] amLODIPine (NORVASC) 10 MG tablet Take 1 tablet by mouth Daily.     • [DISCONTINUED] lisinopril (PRINIVIL,ZESTRIL) 5 MG tablet Take 5 mg by mouth Daily.     • [DISCONTINUED] amLODIPine (NORVASC) 5 MG tablet TAKE ONE TABLET BY MOUTH DAILY (Patient taking differently: 2 tablets.) 90 tablet 0   • [DISCONTINUED] Darolutamide (Nubeqa) 300 MG tablet Take 600 mg by mouth 2 (two) times a day.     • [DISCONTINUED] TRIAMTERENE PO Take 25 mg by mouth.       No current facility-administered medications on file prior to visit.       Patient has no known allergies.    Past Medical History:   Diagnosis Date   • Anal fissure    • Atrial fibrillation (HCC)    • Coronary artery disease    • Hydronephrosis 06/30/2020   • Hyperlipidemia    • Hypertension    • Prostate cancer (HCC)        Social History     Socioeconomic History   • Marital status:    Tobacco Use   • Smoking status: Never   • Smokeless tobacco: Never   Substance and Sexual Activity   • Alcohol use: No   •  "Drug use: No   • Sexual activity: Defer       Family History   Problem Relation Age of Onset   • Kidney disease Mother    • Sudden death Other    • Cancer Other        Review of Systems   Constitutional: Negative.  Negative for chills and fever.   HENT: Negative.  Negative for congestion and sinus pressure.    Respiratory: Negative.  Negative for chest tightness and shortness of breath.    Cardiovascular: Positive for chest pain (odd feeling at times, tight at times) and leg swelling. Negative for palpitations.   Gastrointestinal: Negative.  Negative for abdominal pain, blood in stool, constipation, diarrhea, nausea and vomiting.   Endocrine: Negative.  Negative for cold intolerance and heat intolerance.   Genitourinary: Negative.  Negative for dysuria, frequency, hematuria and urgency.        Reports has catheter   Musculoskeletal: Positive for gait problem (reports bruised ribs on left side from recent fall).   Skin: Positive for wound (from fall).   Neurological: Positive for dizziness (dizzy at times, report recent stay in [facility] due UTI and pneumonia, low sodium). Negative for syncope and light-headedness.   Psychiatric/Behavioral: Negative.  Negative for sleep disturbance (denies waking with soa or cp).       Objective   Vitals:    10/13/22 0841   BP: 152/66   BP Location: Left arm   Patient Position: Sitting   Pulse: 62   SpO2: 99%   Weight: 107 kg (235 lb)   Height: 180.3 cm (71\")      /66 (BP Location: Left arm, Patient Position: Sitting)   Pulse 62   Ht 180.3 cm (71\")   Wt 107 kg (235 lb)   SpO2 99%   BMI 32.78 kg/m²     Lab Results (most recent)     None          Physical Exam  Vitals and nursing note reviewed.   Constitutional:       General: He is not in acute distress.     Appearance: Normal appearance. He is well-developed.   HENT:      Head: Normocephalic and atraumatic.   Eyes:      General: No scleral icterus.        Right eye: No discharge.         Left eye: No discharge.      " Conjunctiva/sclera: Conjunctivae normal.   Neck:      Vascular: No carotid bruit.   Cardiovascular:      Rate and Rhythm: Normal rate and regular rhythm.      Heart sounds: Normal heart sounds. No murmur heard.    No friction rub. No gallop.   Pulmonary:      Effort: Pulmonary effort is normal. No respiratory distress.      Breath sounds: Normal breath sounds. No wheezing or rales.   Chest:      Chest wall: No tenderness.   Musculoskeletal:      Right lower leg: 3+ Edema present.   Skin:     General: Skin is warm and dry.      Coloration: Skin is not pale.      Findings: No erythema or rash.   Neurological:      Mental Status: He is alert and oriented to person, place, and time.      Cranial Nerves: No cranial nerve deficit.   Psychiatric:         Behavior: Behavior normal.         Procedure   Procedures       Assessment & Plan     Problems Addressed this Visit    None  Visit Diagnoses     Chest pain, unspecified type    -  Primary    Relevant Orders    Basic Metabolic Panel    Case Request Cath Lab: Coronary angiography (Completed)    Nonsustained ventricular tachycardia        Relevant Medications    amLODIPine (NORVASC) 5 MG tablet    nitroglycerin (NITROSTAT) 0.4 MG SL tablet    Other Relevant Orders    Basic Metabolic Panel    Case Request Cath Lab: Coronary angiography (Completed)    Atrial fibrillation, chronic (HCC)        Relevant Medications    amLODIPine (NORVASC) 5 MG tablet    nitroglycerin (NITROSTAT) 0.4 MG SL tablet    Other Relevant Orders    Basic Metabolic Panel    Case Request Cath Lab: Coronary angiography (Completed)      Diagnoses       Codes Comments    Chest pain, unspecified type    -  Primary ICD-10-CM: R07.9  ICD-9-CM: 786.50     Nonsustained ventricular tachycardia     ICD-10-CM: I47.29  ICD-9-CM: 427.1     Atrial fibrillation, chronic (HCC)     ICD-10-CM: I48.20  ICD-9-CM: 427.31       Recommendation  1.  Patient is a 89-year-old male who presents back to the office for follow-up with  complaints of chest discomfort with a large lateral wall ischemic defect.  He has had multiple runs of nonsustained V. tach on event monitor with history of coronary disease.  I feel that catheterization is warranted on an expedited fashion.  Patient would like to have this done in [place].  We will try to arrange with Dr. Saha.    2.  In regards to patient's atrial fibrillation and nonsustained V. tach, I am concerned his V. tach might be secondary to ischemia hence the expedited catheterization.  However, he has what appears to be chronic A. fib with varying rates but significant slow ventricular response rates at times.  He has occasional dizziness but no symptoms of presyncope/syncope.  He is to see EP services next week.  I will defer any adjustment of medical therapy to them at this time.    3.  I am placing patient on Lasix 40 mg.  I am placing him on 10 mill equivalents of potassium and I want to check labs in 1 week.  I am increasing lisinopril and decreasing amlodipine which I think will help with lower extremity edema as well.  We may need to stop this medication in the future if his edema does not improve.  It is quite significant on examination    4.  I am prescribing nitroglycerin as needed for chest pain.  We will see patient back for follow-up after catheterization.  He will see EP services next week.  Follow-up with primary as scheduled               Martín Campa  reports that he has never smoked. He has never used smokeless tobacco..     Patient brought in medicine list to appointment, it's been reviewed with patient and med list was updated in the chart.     Advance Care Planning   ACP discussion was held with the patient during this visit. Patient has an advance directive in EMR which is still valid.           Electronically signed by:

## 2022-10-17 ENCOUNTER — PREP FOR SURGERY (OUTPATIENT)
Dept: OTHER | Facility: HOSPITAL | Age: 87
End: 2022-10-17

## 2022-10-17 RX ORDER — ASPIRIN 81 MG/1
324 TABLET, CHEWABLE ORAL ONCE
Status: CANCELLED | OUTPATIENT
Start: 2022-10-17 | End: 2022-10-17

## 2022-10-17 RX ORDER — ASPIRIN 81 MG/1
81 TABLET ORAL DAILY
Status: CANCELLED | OUTPATIENT
Start: 2022-10-18

## 2022-10-17 RX ORDER — SODIUM CHLORIDE 0.9 % (FLUSH) 0.9 %
10 SYRINGE (ML) INJECTION EVERY 12 HOURS SCHEDULED
Status: CANCELLED | OUTPATIENT
Start: 2022-10-17

## 2022-10-17 RX ORDER — SODIUM CHLORIDE 0.9 % (FLUSH) 0.9 %
10 SYRINGE (ML) INJECTION AS NEEDED
Status: CANCELLED | OUTPATIENT
Start: 2022-10-17

## 2022-10-19 ENCOUNTER — HOSPITAL ENCOUNTER (OUTPATIENT)
Facility: HOSPITAL | Age: 87
Setting detail: HOSPITAL OUTPATIENT SURGERY
Discharge: HOME | End: 2022-10-19
Attending: INTERNAL MEDICINE | Admitting: INTERNAL MEDICINE

## 2022-10-19 VITALS
DIASTOLIC BLOOD PRESSURE: 73 MMHG | OXYGEN SATURATION: 100 % | WEIGHT: 234.2 LBS | RESPIRATION RATE: 16 BRPM | HEIGHT: 71 IN | BODY MASS INDEX: 32.79 KG/M2 | HEART RATE: 60 BPM | TEMPERATURE: 97.4 F | SYSTOLIC BLOOD PRESSURE: 133 MMHG

## 2022-10-19 DIAGNOSIS — I47.29 NONSUSTAINED VENTRICULAR TACHYCARDIA: ICD-10-CM

## 2022-10-19 DIAGNOSIS — R07.9 CHEST PAIN, UNSPECIFIED TYPE: ICD-10-CM

## 2022-10-19 DIAGNOSIS — I48.0 PAROXYSMAL ATRIAL FIBRILLATION: ICD-10-CM

## 2022-10-19 DIAGNOSIS — I48.20 ATRIAL FIBRILLATION, CHRONIC: ICD-10-CM

## 2022-10-19 LAB
ALBUMIN SERPL-MCNC: 3.6 G/DL (ref 3.5–5.2)
ALBUMIN/GLOB SERPL: 1.2 G/DL
ALP SERPL-CCNC: 145 U/L (ref 39–117)
ALT SERPL W P-5'-P-CCNC: 9 U/L (ref 1–41)
ANION GAP SERPL CALCULATED.3IONS-SCNC: 8 MMOL/L (ref 5–15)
AST SERPL-CCNC: 10 U/L (ref 1–40)
BILIRUB SERPL-MCNC: 0.4 MG/DL (ref 0–1.2)
BUN SERPL-MCNC: 16 MG/DL (ref 8–23)
BUN/CREAT SERPL: 14.2 (ref 7–25)
CALCIUM SPEC-SCNC: 9.1 MG/DL (ref 8.6–10.5)
CHLORIDE SERPL-SCNC: 98 MMOL/L (ref 98–107)
CHOLEST SERPL-MCNC: 169 MG/DL (ref 0–200)
CO2 SERPL-SCNC: 26 MMOL/L (ref 22–29)
CREAT SERPL-MCNC: 1.13 MG/DL (ref 0.76–1.27)
DEPRECATED RDW RBC AUTO: 54.4 FL (ref 37–54)
EGFRCR SERPLBLD CKD-EPI 2021: 63.7 ML/MIN/1.73
ERYTHROCYTE [DISTWIDTH] IN BLOOD BY AUTOMATED COUNT: 15.7 % (ref 12.3–15.4)
GLOBULIN UR ELPH-MCNC: 3.1 GM/DL
GLUCOSE SERPL-MCNC: 114 MG/DL (ref 65–99)
HBA1C MFR BLD: 5.5 % (ref 4.8–5.6)
HCT VFR BLD AUTO: 32.9 % (ref 37.5–51)
HDLC SERPL-MCNC: 86 MG/DL (ref 40–60)
HGB BLD-MCNC: 10.9 G/DL (ref 13–17.7)
LDLC SERPL CALC-MCNC: 66 MG/DL (ref 0–100)
LDLC/HDLC SERPL: 0.74 {RATIO}
MCH RBC QN AUTO: 31.4 PG (ref 26.6–33)
MCHC RBC AUTO-ENTMCNC: 33.1 G/DL (ref 31.5–35.7)
MCV RBC AUTO: 94.8 FL (ref 79–97)
PLATELET # BLD AUTO: 241 10*3/MM3 (ref 140–450)
PMV BLD AUTO: 9.5 FL (ref 6–12)
POTASSIUM SERPL-SCNC: 3.6 MMOL/L (ref 3.5–5.2)
PROT SERPL-MCNC: 6.7 G/DL (ref 6–8.5)
RBC # BLD AUTO: 3.47 10*6/MM3 (ref 4.14–5.8)
SODIUM SERPL-SCNC: 132 MMOL/L (ref 136–145)
TRIGL SERPL-MCNC: 97 MG/DL (ref 0–150)
VLDLC SERPL-MCNC: 17 MG/DL (ref 5–40)
WBC NRBC COR # BLD: 10.55 10*3/MM3 (ref 3.4–10.8)

## 2022-10-19 PROCEDURE — 80053 COMPREHEN METABOLIC PANEL: CPT

## 2022-10-19 PROCEDURE — 93454 CORONARY ARTERY ANGIO S&I: CPT | Performed by: INTERNAL MEDICINE

## 2022-10-19 PROCEDURE — 83036 HEMOGLOBIN GLYCOSYLATED A1C: CPT

## 2022-10-19 PROCEDURE — C1894 INTRO/SHEATH, NON-LASER: HCPCS | Performed by: INTERNAL MEDICINE

## 2022-10-19 PROCEDURE — 0 IOPAMIDOL PER 1 ML: Performed by: INTERNAL MEDICINE

## 2022-10-19 PROCEDURE — 85027 COMPLETE CBC AUTOMATED: CPT

## 2022-10-19 PROCEDURE — 80061 LIPID PANEL: CPT

## 2022-10-19 PROCEDURE — 25010000002 HEPARIN (PORCINE) PER 1000 UNITS: Performed by: INTERNAL MEDICINE

## 2022-10-19 RX ORDER — ASPIRIN 81 MG/1
81 TABLET ORAL DAILY
Status: DISCONTINUED | OUTPATIENT
Start: 2022-10-20 | End: 2022-10-19 | Stop reason: HOSPADM

## 2022-10-19 RX ORDER — ASPIRIN 81 MG/1
324 TABLET, CHEWABLE ORAL ONCE
Status: COMPLETED | OUTPATIENT
Start: 2022-10-19 | End: 2022-10-19

## 2022-10-19 RX ORDER — SODIUM CHLORIDE 0.9 % (FLUSH) 0.9 %
10 SYRINGE (ML) INJECTION EVERY 12 HOURS SCHEDULED
Status: DISCONTINUED | OUTPATIENT
Start: 2022-10-19 | End: 2022-10-19 | Stop reason: HOSPADM

## 2022-10-19 RX ORDER — SODIUM CHLORIDE 9 MG/ML
3 INJECTION, SOLUTION INTRAVENOUS CONTINUOUS
Status: ACTIVE | OUTPATIENT
Start: 2022-10-19 | End: 2022-10-19

## 2022-10-19 RX ORDER — SODIUM CHLORIDE 0.9 % (FLUSH) 0.9 %
10 SYRINGE (ML) INJECTION AS NEEDED
Status: DISCONTINUED | OUTPATIENT
Start: 2022-10-19 | End: 2022-10-19 | Stop reason: HOSPADM

## 2022-10-19 RX ORDER — NICARDIPINE HCL-0.9% SOD CHLOR 1 MG/10 ML
SYRINGE (ML) INTRAVENOUS
Status: DISCONTINUED | OUTPATIENT
Start: 2022-10-19 | End: 2022-10-19 | Stop reason: HOSPADM

## 2022-10-19 RX ORDER — LIDOCAINE HYDROCHLORIDE 10 MG/ML
INJECTION, SOLUTION EPIDURAL; INFILTRATION; INTRACAUDAL; PERINEURAL
Status: DISCONTINUED | OUTPATIENT
Start: 2022-10-19 | End: 2022-10-19 | Stop reason: HOSPADM

## 2022-10-19 RX ORDER — HEPARIN SODIUM 1000 [USP'U]/ML
INJECTION, SOLUTION INTRAVENOUS; SUBCUTANEOUS
Status: DISCONTINUED | OUTPATIENT
Start: 2022-10-19 | End: 2022-10-19 | Stop reason: HOSPADM

## 2022-10-19 RX ADMIN — ASPIRIN 81 MG 324 MG: 81 TABLET ORAL at 11:41

## 2022-10-19 RX ADMIN — SODIUM CHLORIDE 3 ML/KG/HR: 9 INJECTION, SOLUTION INTRAVENOUS at 11:00

## 2022-10-19 NOTE — INTERVAL H&P NOTE
Precardiac Catheterization Update  H&P reviewed. The patient was examined and there are no changes to the H&P.      • Patient is a Gnosticist and refuses any potential blood products.  • Patient's last dose of Eliquis was Sunday.  He denies prior complications with sedation or contrast dye.    Oximetric Alden's test:  L: Normal  R: normal    Vitals and Labs  Vitals:    10/19/22 1031   BP: 140/66   Pulse: 64   Resp:    Temp:    SpO2: 100%     Lab Results   Component Value Date    GLUCOSE 114 (H) 10/19/2022    BUN 16 10/19/2022    CREATININE 1.13 10/19/2022    EGFRIFNONA 47 (L) 01/19/2022    EGFRIFAFRI 58 (L) 01/19/2022    BCR 14.2 10/19/2022    K 3.6 10/19/2022    CO2 26.0 10/19/2022    CALCIUM 9.1 10/19/2022    ALBUMIN 3.60 10/19/2022    AST 10 10/19/2022    ALT 9 10/19/2022     Lab Results   Component Value Date    WBC 10.55 10/19/2022    HGB 10.9 (L) 10/19/2022    HCT 32.9 (L) 10/19/2022    MCV 94.8 10/19/2022     10/19/2022     Lab Results   Component Value Date    CHOL 169 10/19/2022    TRIG 97 10/19/2022    HDL 86 (H) 10/19/2022    LDL 66 10/19/2022     Lab Results   Component Value Date    HGBA1C 5.50 10/19/2022     The nature of left heart catheterization with possible catheter-based intervention along with the benefits, risks and alternatives were discussed with patient and family.  Patient expressed understanding and wishes to proceed.    Mihir Castellano PA-C  Staff interventional cardiologist:I have seen and examined the patient, case was discussed with the physician extender, reviewed the above note, necessary changes were made and I agree with the final note.    Patient has history of atrial fibrillation and was discharged from the hospital with pneumonia.  Has been doing fine.  Patient has a positive stress test for which he is undergoing a heart catheterization.  Further recommendation based on heart cath findings.    Tonja Canales MD

## 2022-10-21 ENCOUNTER — OFFICE VISIT (OUTPATIENT)
Dept: CARDIOLOGY | Facility: CLINIC | Age: 87
End: 2022-10-21

## 2022-10-21 VITALS
BODY MASS INDEX: 32.76 KG/M2 | SYSTOLIC BLOOD PRESSURE: 110 MMHG | HEIGHT: 71 IN | DIASTOLIC BLOOD PRESSURE: 64 MMHG | WEIGHT: 234 LBS | HEART RATE: 75 BPM | OXYGEN SATURATION: 95 %

## 2022-10-21 DIAGNOSIS — I48.11 LONGSTANDING PERSISTENT ATRIAL FIBRILLATION: ICD-10-CM

## 2022-10-21 DIAGNOSIS — I47.29 NONSUSTAINED VENTRICULAR TACHYCARDIA: Primary | ICD-10-CM

## 2022-10-21 PROCEDURE — 99215 OFFICE O/P EST HI 40 MIN: CPT | Performed by: STUDENT IN AN ORGANIZED HEALTH CARE EDUCATION/TRAINING PROGRAM

## 2022-10-21 NOTE — PROGRESS NOTES
Cardiac Electrophysiology Outpatient Note  [facility] at [facility]    Office Visit     Martín Campa  [id number]  10/21/2022    Primary Care Physician: West Gatica MD    Referred By: No ref. provider found    CC:   Chief Complaint   Patient presents with   • Atrial Fibrillation       History of Present Illness:   Martín Campa is a 87 y.o. male who presents to the electrophysiology clinic for follow up of persistent atrial fibrillation.  He has since been diagnosed with prostate cancer and remains on medication for this.  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 Echo from his hospitalization revealed normal EF, mild LVH, mild septal hypertrophy. Since he was last in the office he has began having episodes of dizziness and presyncope.  Event monitor was applied, final report still pending, he was noted to have runs of 3 runs of nonsustained VT with the longest being 15 beats, 97% A. fib burden.  A cardiac catheterization was performed 2 days ago which revealed nonobstructive CAD, no stents were placed.  He reports that after his cardiac catheterization he had a fever and has become very weak, his wife tried to check his blood pressure at home and could not get a reading.  His fever broke overnight last night.  He also reports continued problems with rectal bleeding.  He does continue to take his Eliquis as prescribed but is interested in a watchman device, which has been discussed previously at his last appointment.    Past Surgical History:   Procedure Laterality Date   • CARDIAC CATHETERIZATION     • CARDIAC CATHETERIZATION Left 10/19/2022    Procedure: Coronary angiography;  Surgeon: Tonja Canales MD;  Location: [facility] INVASIVE LOCATION;  Service: Cardiovascular;  Laterality: Left;   • CARDIAC SURGERY       cardiac loop implant   • HERNIA REPAIR         Family History   Problem Relation Age of Onset   • Kidney disease Mother    • Sudden death Other    • Cancer Other        Social History     Socioeconomic History   • Marital status:    Tobacco Use   • Smoking status: Never   • Smokeless tobacco: Never   Vaping Use   • Vaping Use: Never used   Substance and Sexual Activity   • Alcohol use: Yes     Alcohol/week: 7.0 standard drinks     Types: 7 Cans of beer per week     Comment: 1 beer a day   • Drug use: No   • Sexual activity: Defer         Current Outpatient Medications:   •  abiraterone acetate (ZYTIGA) 250 MG chemo tablet, Take  by mouth Daily. 4 tablets in the morning, Disp: , Rfl:   •  amLODIPine (NORVASC) 5 MG tablet, Take 1 tablet by mouth Daily., Disp: 30 tablet, Rfl: 11  •  Ascorbic Acid (Vitamin C) 500 MG capsule, Take 2 tablets by mouth Daily., Disp: , Rfl:   •  atorvastatin (LIPITOR) 10 MG tablet, Take 1 tablet by mouth Daily., Disp: 30 tablet, Rfl: 11  •  Cholecalciferol (VITAMIN D3) 5000 UNITS capsule capsule, Take 5,000 Units by mouth Daily., Disp: , Rfl:   •  Eliquis 5 MG tablet tablet, TAKE ONE TABLET BY MOUTH EVERY 12 HOURS (Patient taking differently: 1 tablet Every 12 (Twelve) Hours.), Disp: 60 tablet, Rfl: 5  •  folic acid (FOLVITE) 800 MCG tablet, Take 400 mcg by mouth Daily., Disp: , Rfl:   •  furosemide (LASIX) 40 MG tablet, Take 1 tablet by mouth Daily., Disp: 30 tablet, Rfl: 11  •  levothyroxine (SYNTHROID, LEVOTHROID) 75 MCG tablet, Take 75 mcg by mouth Daily., Disp: , Rfl: 0  •  lisinopril (PRINIVIL,ZESTRIL) 10 MG tablet, Take 1 tablet by mouth Daily., Disp: 30 tablet, Rfl: 5  •  nitroglycerin (NITROSTAT) 0.4 MG SL tablet, 1 under the tongue as needed for angina, may repeat q5mins for up three doses, Disp: 25 tablet, Rfl: 11  •  potassium chloride 10 MEQ CR tablet, Take 1 tablet by mouth Daily., Disp: 30 tablet, Rfl: 11  •  predniSONE (DELTASONE) 5 MG tablet, Take 5 mg by mouth 2  "(Two) Times a Day., Disp: , Rfl:     Allergies:   No Known Allergies    Vital Signs: Blood pressure 110/64, pulse 75, height 180.3 cm (71\"), weight 106 kg (234 lb), SpO2 95 %.    Physical Exam     GEN: Well nourished, well-developed, no acute distress  HEENT: Normocephalic, atraumatic, moist mucous membranes  NECK: Supple, no JVD, no thyromegaly, no lymphadenopathy  CARD: Regular rate and rhythm, normal S1 & S2 are present.  No murmur, gallop or rubs are appreciated.  LUNGS: Clear to auscultation bilateraly, normal respiratory effort  ABDOMEN: Soft, nontender, normal bowel sounds  EXTREMITIES: No gross deformities, no clubbing, cyanosis.  No edema  SKIN: Warm, dry  NEURO: No focal deficits, alert and oriented x 3  PSYCHIATRIC: Normal affect and mood, appropriate use of semantics and logic.      Lab Results   Component Value Date    GLUCOSE 114 (H) 10/19/2022    CALCIUM 9.1 10/19/2022     (L) 10/19/2022    K 3.6 10/19/2022    CO2 26.0 10/19/2022    CL 98 10/19/2022    BUN 16 10/19/2022    CREATININE 1.13 10/19/2022    EGFRIFAFRI 58 (L) 01/19/2022    EGFRIFNONA 47 (L) 01/19/2022    BCR 14.2 10/19/2022    ANIONGAP 8.0 10/19/2022     Lab Results   Component Value Date    WBC 10.55 10/19/2022    HGB 10.9 (L) 10/19/2022    HCT 32.9 (L) 10/19/2022    MCV 94.8 10/19/2022     10/19/2022     No results found for: INR, PROTIME  No results found for: TSH, S7AYRUU, M9JTFXX, THYROIDAB     Results for orders placed in visit on 12/20/18    SCANNED - ECHOCARDIOGRAM      I personally reviewed the tracings from his ambulatory monitor.  This showed a few episodes of ventricular tachycardia as detailed below.      Assessment & Plan    1. Nonsustained ventricular tachycardia  -I reviewed his ambulatory monitor which shows a few runs of nonsustained VT.  The longest of these was about 15 beats.  The fastest of these was around 170 bpm.  He does have some symptoms of presyncope and has had some falls before, however his " episodes of nonsustained VT on his monitor had no symptoms associated with them.  I am uncertain the relationship between these nonsustained VT episodes and his prior symptoms.  -We had a long discussion about the pathophysiology of these nonsustained VT episodes and potential management.  In the setting of a normal ejection fraction and with no obstructive coronary disease I would not recommend further aggressive treatment of nonsustained VT.  It is possible that he is having some symptoms with this, however I feel as though his presyncopal episodes and falls are more likely related to other etiologies.  We discussed we could consider things like additional monitoring, additional antiarrhythmic medications, or even an EP study.  I am not sure that given the risks and benefits, including his age that these will be worthwhile.  After prolonged discussion we decided that we will not proceed with any more aggressive work-up or therapies at the current time.  I did discuss with him that she should be aware of his symptoms and if he begins to notice more issues that we may need to undertake further monitoring or testing.    2. Longstanding persistent atrial fibrillation (HCC)  He does have a history of longstanding persistent atrial fibrillation.  He is currently on Eliquis for this.  He has had some issue with bleeding in the past.  We discussed the potential option of a watchman procedure.  At the current time he is not having any further bleeding.  In our discussion we noted that if he is able to tolerate blood thinners I think this would be a better option for him.  However to get to the point where he has recurrent bleeding, then I think a watchman would be quite reasonable.  We will continue to monitor this.       Follow Up:  Return in about 6 months (around 4/21/2023).    I spent 52 minutes caring for Martín on this date of service. This time includes time spent by me in the following activities:preparing for the  visit, reviewing tests, obtaining and/or reviewing a separately obtained history, performing a medically appropriate examination and/or evaluation , counseling and educating the patient/family/caregiver, ordering medications, tests, or procedures, referring and communicating with other health care professionals , documenting information in the medical record, independently interpreting results and communicating that information with the patient/family/caregiver, and care coordination.

## 2022-10-25 ENCOUNTER — TELEPHONE (OUTPATIENT)
Dept: CARDIOLOGY | Facility: CLINIC | Age: 87
End: 2022-10-25

## 2022-10-25 DIAGNOSIS — R53.83 LETHARGY: Primary | ICD-10-CM

## 2022-10-25 NOTE — TELEPHONE ENCOUNTER
"Spoke w/ Sha echeverria pt: he stated that it does sound likely related to kidneys and possibly dehydration. Stated that they need to do labs w/ PCP, but as they have no been reachable that he will order labs for pt: CBC, BMP, Mag, TSH.   Proceed to ER for further issues.       Called Alice back, I was advised to speak w/ Vance as Alice was on a different call, I advised him of the above. He told me that he is not sure pt will be able to get labs as he's not moving much. I explained that there's not much we can do w/o labs showing up what the problem could be. He stated pt has improved some since meds were stopped, I asked him if he knows what pt stopped, he stated \"pretty much everything.\" He stated that they were told Lipitor was the major issue by EMS. Stated pt is taking his Eliquis and his cancer pill, but that's basically it for right now.     I told him that if they can try to get his labs done for us, that would be great so we can get an indication of what's going on. He stated he would see if pt is up to it and that they would \"keep in touch.\"   "

## 2022-10-25 NOTE — TELEPHONE ENCOUNTER
"Alice Elizabeth [facility] stating pt is having \"issues w/ his medications\" and that they have been unsuccessful in reaching his primary care and are not getting a response. Stated that they need a call ASAP.   #[phone]    No additional info was left on VM.       Per chart review, Alice is on pt's release.       Called Alice, asked what is occurring w/ pt. She stated that Gonzalo started pt on new meds last week and that Thursday/Friday he started getting groggy and lethargic. States Sunday pt could barely move or swallow. Stated they called EMTs and they told them that they think it's pt's new meds. They told pt to stop taking the medicine and push fluids to clear his system. Stated that the EMTs would not take him because the ER would not admit him for issues w/ medicine.     Stated pt stopped the statin, potassium, and whatever was new on Sunday. I asked how he is currently, she stated pt is still lethargic, has dark urine and low output. I told her that dark urine and low output indicates an issue w/ kidneys. She stated that when the EMTs saw pt that urine was clear and bag was full. Stated that dark urine has been since Sunday when he started drinking less.   She stated she's been trying to reach PCP as they started him on a water pill, but she can't reach them after two attempts. I asked what water pill they put pt on, as we have him on one too, she stated she's unsure. I told her statin and potassium wouldn't cause these issues, but he could have issues if he's on multiple water pills.     I asked if they know of his VS, she stated \"it's been fine\" she does not have the actual readings.                   "

## 2022-11-02 ENCOUNTER — TELEPHONE (OUTPATIENT)
Dept: CARDIOLOGY | Facility: CLINIC | Age: 87
End: 2022-11-02

## 2022-11-02 NOTE — TELEPHONE ENCOUNTER
----- Message from JESS Henderson sent at 11/1/2022  9:10 AM EDT -----  As per recommendations.  ----- Message -----  From: Nahid Rosario MD  Sent: 10/28/2022   8:20 PM EDT  To: JESS Henderson

## 2023-01-30 RX ORDER — LISINOPRIL 10 MG/1
10 TABLET ORAL DAILY
Qty: 30 TABLET | Refills: 5 | Status: SHIPPED | OUTPATIENT
Start: 2023-01-30 | End: 2023-04-03 | Stop reason: SDUPTHER

## 2023-03-01 DIAGNOSIS — I48.0 PAROXYSMAL ATRIAL FIBRILLATION: ICD-10-CM

## 2023-03-01 RX ORDER — APIXABAN 5 MG/1
TABLET, FILM COATED ORAL
Qty: 60 TABLET | Refills: 5 | Status: SHIPPED | OUTPATIENT
Start: 2023-03-01 | End: 2023-03-01 | Stop reason: SDUPTHER

## 2023-04-03 ENCOUNTER — OFFICE VISIT (OUTPATIENT)
Dept: CARDIOLOGY | Facility: CLINIC | Age: 88
End: 2023-04-03
Payer: MEDICARE

## 2023-04-03 VITALS
HEART RATE: 66 BPM | SYSTOLIC BLOOD PRESSURE: 127 MMHG | HEIGHT: 71 IN | BODY MASS INDEX: 32.62 KG/M2 | WEIGHT: 233 LBS | DIASTOLIC BLOOD PRESSURE: 70 MMHG | OXYGEN SATURATION: 99 %

## 2023-04-03 DIAGNOSIS — I48.19 PERSISTENT ATRIAL FIBRILLATION: Primary | ICD-10-CM

## 2023-04-03 DIAGNOSIS — I10 ESSENTIAL HYPERTENSION: ICD-10-CM

## 2023-04-03 DIAGNOSIS — R06.02 SHORTNESS OF BREATH: ICD-10-CM

## 2023-04-03 PROCEDURE — 1159F MED LIST DOCD IN RCRD: CPT | Performed by: PHYSICIAN ASSISTANT

## 2023-04-03 PROCEDURE — 1160F RVW MEDS BY RX/DR IN RCRD: CPT | Performed by: PHYSICIAN ASSISTANT

## 2023-04-03 PROCEDURE — 99213 OFFICE O/P EST LOW 20 MIN: CPT | Performed by: PHYSICIAN ASSISTANT

## 2023-04-03 PROCEDURE — 3074F SYST BP LT 130 MM HG: CPT | Performed by: PHYSICIAN ASSISTANT

## 2023-04-03 PROCEDURE — 3078F DIAST BP <80 MM HG: CPT | Performed by: PHYSICIAN ASSISTANT

## 2023-04-03 RX ORDER — LISINOPRIL 10 MG/1
10 TABLET ORAL DAILY
Qty: 90 TABLET | Refills: 3 | Status: SHIPPED | OUTPATIENT
Start: 2023-04-03

## 2023-04-03 NOTE — PROGRESS NOTES
Problem list     Subjective   Martín Campa is a 87 y.o. male     Chief Complaint   Patient presents with   • Follow-up     Atrial fibrillation   Problem list   1. Atrial fibrillation with RVR during hospitalization, October 2014.  1.1 Status post cardioversion, December 2014.  1.2 Residual A flutter, despite increased dosing of Flecainide.  1.3 Repeat Cardioversion, 09/2016, with maintenance of SR with Flecainide.  The patient has recently been discontinued from flecainide by EP services, per his report.  1.4 no recommendation for rate control given recurrent bradycardia dysrhythmic activity.  The patient is followed closely through EP services for the same at this time.  1.5 repeat event monitor October 2022 demonstrating chronic atrial fibrillation but multiple runs of nonsustained V. tach.  Slow ventricular response rates were noted during the study as well  2. 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 tach noted by event monitor, 10/2022.  This supported nonobstructive disease with at most 40% RCA disease noted.  Medical management was recommended.  Also noted was echocardiogram just prior to that examination, 9/2022, indicating normal systolic function.  3. Hypertension  4. Dyslipidemia      5. Prostate cancer      6. First degree AVB per previous telemetry.    HPI  The patient presents back today for evaluation and follow-up.  The patient was scheduled for catheterization in October because of nonsustained V. tach and abnormal stress test findings.  Catheterization indicated nonobstructive disease.  Echo prior to that indicated preserved systolic function.  He was seen in follow-up for evaluation through EP services because of clinical scenario, in particular nonsustained V. tach.  Given nonobstructive CAD and preserved systolic  function, monitoring was felt indicated.  The patient presents back today for review.  He does well.  He denies chest pain currently.  Dyspnea is moderate but at baseline.  He really does not sense any dysrhythmic symptoms at this time.  He denies dizziness or syncope.  He has no failure symptoms.  The patient feels that mostly he is doing well.    Current Outpatient Medications on File Prior to Visit   Medication Sig Dispense Refill   • abiraterone acetate (ZYTIGA) 250 MG chemo tablet Take  by mouth Daily. 4 tablets in the morning     • apixaban (Eliquis) 5 MG tablet tablet Take 1 tablet by mouth Every 12 (Twelve) Hours. 60 tablet 11   • Ascorbic Acid (Vitamin C) 500 MG capsule Take 2 tablets by mouth Daily.     • Cholecalciferol (VITAMIN D3) 5000 UNITS capsule capsule Take 1 capsule by mouth Daily.     • folic acid (FOLVITE) 800 MCG tablet Take 400 mcg by mouth Daily.     • furosemide (LASIX) 40 MG tablet Take 1 tablet by mouth Daily. 30 tablet 11   • levothyroxine (SYNTHROID, LEVOTHROID) 75 MCG tablet Take 1 tablet by mouth Daily.  0   • nitroglycerin (NITROSTAT) 0.4 MG SL tablet 1 under the tongue as needed for angina, may repeat q5mins for up three doses 25 tablet 11   • potassium chloride 10 MEQ CR tablet Take 1 tablet by mouth Daily. 30 tablet 11   • predniSONE (DELTASONE) 5 MG tablet Take 1 tablet by mouth 2 (Two) Times a Day.       No current facility-administered medications on file prior to visit.       Patient has no known allergies.    Past Medical History:   Diagnosis Date   • Anal fissure    • Atrial fibrillation    • Coronary artery disease    • Hydronephrosis 06/30/2020   • Hyperlipidemia    • Hypertension    • Prostate cancer        Social History     Socioeconomic History   • Marital status:    Tobacco Use   • Smoking status: Never   • Smokeless tobacco: Never   Vaping Use   • Vaping Use: Never used   Substance and Sexual Activity   • Alcohol use: Yes     Alcohol/week: 7.0 standard drinks  "    Types: 7 Cans of beer per week     Comment: 1 beer a day   • Drug use: No   • Sexual activity: Defer       Family History   Problem Relation Age of Onset   • Kidney disease Mother    • Sudden death Other    • Cancer Other        Review of Systems   Eyes: Negative.  Negative for visual disturbance.   Respiratory: Negative for apnea, cough, chest tightness, shortness of breath and wheezing.    Cardiovascular: Negative.  Negative for chest pain, palpitations and leg swelling.   Gastrointestinal: Positive for blood in stool.   Endocrine: Negative.  Negative for cold intolerance and heat intolerance.   Genitourinary: Negative.  Negative for hematuria.   Musculoskeletal: Positive for back pain and gait problem. Negative for arthralgias, joint swelling, neck pain and neck stiffness.   Skin: Negative.  Negative for color change, rash and wound.   Allergic/Immunologic: Negative.  Negative for environmental allergies and food allergies.   Neurological: Negative for dizziness, syncope, weakness, light-headedness, numbness and headaches.   Hematological: Negative.  Does not bruise/bleed easily.   Psychiatric/Behavioral: Negative.  Negative for sleep disturbance.       Objective   Vitals:    04/03/23 1116   BP: 127/70   BP Location: Left arm   Patient Position: Sitting   Cuff Size: Adult   Pulse: 66   SpO2: 99%   Weight: 106 kg (233 lb)   Height: 180.3 cm (71\")      /70 (BP Location: Left arm, Patient Position: Sitting, Cuff Size: Adult)   Pulse 66   Ht 180.3 cm (71\")   Wt 106 kg (233 lb)   SpO2 99%   BMI 32.50 kg/m²    Lab Results (most recent)     None        Physical Exam  Vitals and nursing note reviewed.   Constitutional:       General: He is not in acute distress.     Appearance: He is well-developed.   HENT:      Head: Normocephalic and atraumatic.   Eyes:      Conjunctiva/sclera: Conjunctivae normal.      Pupils: Pupils are equal, round, and reactive to light.   Neck:      Vascular: No JVD.      Trachea: " No tracheal deviation.   Cardiovascular:      Rate and Rhythm: Normal rate. Rhythm irregular.      Heart sounds: Normal heart sounds.   Pulmonary:      Effort: Pulmonary effort is normal.      Breath sounds: Normal breath sounds.   Abdominal:      General: Bowel sounds are normal. There is no distension.      Palpations: Abdomen is soft. There is no mass.      Tenderness: There is no abdominal tenderness. There is no guarding or rebound.   Musculoskeletal:         General: No tenderness or deformity. Normal range of motion.      Cervical back: Normal range of motion and neck supple.      Right lower le+ Edema present.      Left lower le+ Edema present.   Skin:     General: Skin is warm and dry.      Coloration: Skin is not pale.      Findings: No erythema or rash.   Neurological:      Mental Status: He is alert and oriented to person, place, and time.   Psychiatric:         Behavior: Behavior normal.         Thought Content: Thought content normal.         Judgment: Judgment normal.           Procedure   Procedures       Assessment & Plan      Diagnosis Plan   1. Persistent atrial fibrillation        2. Shortness of breath        3. Essential hypertension          1.  At this time, the patient appears to be doing fairly well.  A-fib remains nonproblematic for the patient.  He is tolerating anticoagulation without complication.  He is following with EP services to discuss left atrial appendage occluder.  He also follows with EP provider given history of nonsustained V. tach.  We are following along with this.    2.  With nonobstructive disease and preserved systolic function, monitoring only of nonsustained V. tach was indicated by EP services.  We have reviewed this again with the patient.  He is pending long-term follow-up with his EP provider.    3.  Otherwise, the patient mostly feels well from general cardiovascular standpoint.  He denies symptoms of angina, failure, or dysrhythmias at this time.  I do  not feel further evaluation is warranted.    4.  We will continue medications for now without change.  For change in clinical course he will call.  We will continue to see him on 6-month intervals, sooner for complications           Patient brought in medicine list to appointment, it's been reviewed with patient and med list was updated in the chart.     Advance Care Planning   ACP discussion was declined by the patient. Patient has an advance directive in EMR which is still valid.  Patient has an advance directive (not in EMR), copy requested.           Electronically signed by:

## 2023-06-09 NOTE — PROGRESS NOTES
Chief Complaint:          Prostate cancer    HPI:   83 y.o. male.  Pt is here only for lupron injections  HPI      Past Medical History:        Past Medical History:   Diagnosis Date   • Atrial fibrillation (CMS/HCC)    • Coronary artery disease    • Hyperlipidemia    • Hypertension    • Prostate cancer (CMS/HCC)          Current Meds:     Current Outpatient Medications   Medication Sig Dispense Refill   • amLODIPine (NORVASC) 5 MG tablet take 1 tablet by mouth once daily 90 tablet 3   • apixaban (ELIQUIS) 5 MG tablet tablet Take 1 tablet by mouth Every 12 (Twelve) Hours. 60 tablet 6   • Bioflavonoid Products (SCOTT-C) 500-550 MG tablet Take 1 tablet by mouth Daily.     • Cholecalciferol (VITAMIN D3) 5000 UNITS capsule capsule Take 5,000 Units by mouth Daily.     • flecainide (TAMBOCOR) 50 MG tablet Take 25 mg by mouth 2 (Two) Times a Day.     • folic acid (FOLVITE) 800 MCG tablet Take 800 mcg by mouth daily.     • levothyroxine (SYNTHROID, LEVOTHROID) 75 MCG tablet Take 75 mcg by mouth Daily.  0   • lisinopril (PRINIVIL,ZESTRIL) 5 MG tablet Take 5 mg by mouth Daily.     • niacin (NIACIN SR,NIASPAN ER) 500 MG CR capsule Take 500 mg by mouth Every Night.     • triamterene-hydrochlorothiazide (MAXZIDE-25) 37.5-25 MG per tablet Take 1 tablet by mouth daily.       No current facility-administered medications for this visit.         Allergies:      No Known Allergies     Past Surgical History:     Past Surgical History:   Procedure Laterality Date   • CARDIAC CATHETERIZATION     • CARDIAC SURGERY      cardiac loop implant   • HERNIA REPAIR           Social History:     Social History     Socioeconomic History   • Marital status:      Spouse name: Not on file   • Number of children: Not on file   • Years of education: Not on file   • Highest education level: Not on file   Social Needs   • Financial resource strain: Not on file   • Food insecurity - worry: Not on file   • Food insecurity - inability: Not on file   •  Quality 226: Preventive Care And Screening: Tobacco Use: Screening And Cessation Intervention: Patient screened for tobacco use and is an ex/non-smoker "Transportation needs - medical: Not on file   • Transportation needs - non-medical: Not on file   Occupational History   • Not on file   Tobacco Use   • Smoking status: Never Smoker   • Smokeless tobacco: Never Used   Substance and Sexual Activity   • Alcohol use: No   • Drug use: No   • Sexual activity: Defer   Other Topics Concern   • Not on file   Social History Narrative   • Not on file       Family History:     Family History   Problem Relation Age of Onset   • Kidney disease Mother    • Sudden death Other    • Cancer Other        Review of Systems:     Review of Systems      Physical Exam:     Physical Exam    Ht 180.3 cm (71\")   Wt 105 kg (232 lb)   BMI 32.36 kg/m²    Procedure:         Assessment:     Encounter Diagnosis   Name Primary?   • Prostate cancer (CMS/HCC) Yes     No orders of the defined types were placed in this encounter.      Plan:   Pt will return in 3 months with psa to see how his cancer responds to lupron again    PThis document has been electronically signed by Brett Talavera MD March 12, 2019 12:33 PM  " Detail Level: Generalized Quality 130: Documentation Of Current Medications In The Medical Record: Current Medications Documented Quality 431: Preventive Care And Screening: Unhealthy Alcohol Use - Screening: Patient not identified as an unhealthy alcohol user when screened for unhealthy alcohol use using a systematic screening method Quality 47: Advance Care Plan: Advance Care Planning discussed and documented; advance care plan or surrogate decision maker documented in the medical record.

## 2023-06-16 ENCOUNTER — OFFICE VISIT (OUTPATIENT)
Dept: CARDIOLOGY | Facility: CLINIC | Age: 88
End: 2023-06-16
Payer: MEDICARE

## 2023-06-16 VITALS
SYSTOLIC BLOOD PRESSURE: 114 MMHG | OXYGEN SATURATION: 97 % | DIASTOLIC BLOOD PRESSURE: 60 MMHG | HEART RATE: 73 BPM | WEIGHT: 228 LBS | HEIGHT: 71 IN | BODY MASS INDEX: 31.92 KG/M2

## 2023-06-16 DIAGNOSIS — I10 PRIMARY HYPERTENSION: ICD-10-CM

## 2023-06-16 DIAGNOSIS — I48.11 LONGSTANDING PERSISTENT ATRIAL FIBRILLATION: Primary | ICD-10-CM

## 2023-06-16 PROCEDURE — 3078F DIAST BP <80 MM HG: CPT | Performed by: STUDENT IN AN ORGANIZED HEALTH CARE EDUCATION/TRAINING PROGRAM

## 2023-06-16 PROCEDURE — 99214 OFFICE O/P EST MOD 30 MIN: CPT | Performed by: STUDENT IN AN ORGANIZED HEALTH CARE EDUCATION/TRAINING PROGRAM

## 2023-06-16 PROCEDURE — 3074F SYST BP LT 130 MM HG: CPT | Performed by: STUDENT IN AN ORGANIZED HEALTH CARE EDUCATION/TRAINING PROGRAM

## 2023-06-16 NOTE — PROGRESS NOTES
Cardiac Electrophysiology Outpatient Note  [facility] at [facility]    Office Visit     Martín Campa  [id number]      Primary Care Physician: West Gatica MD        CC:   No chief complaint on file.      History of Present Illness:   Martín Campa is a 87 y.o. male who presents to the electrophysiology clinic for follow up of permanent atrial fibrillation and NSVT.  He was last seen by our office 10/21/2022. He has had a LHC revealing no significant CAD 10/2022.     Since his last visit he has been admitted to the hospital a few times for UTI.  He has a chronic indwelling Shannon catheter.  He denies any dizziness or syncope.  Denies any heart rhythm problems as far as he is aware.  His biggest issue has been with his frequent hospitalizations.  He does also have some rectal bleeding that has been a big issue as well.  Has been unable to do GI work-up at the current time due to not wanting to receive any blood transfusions.      Past Surgical History:   Procedure Laterality Date   • CARDIAC CATHETERIZATION     • CARDIAC CATHETERIZATION Left 10/19/2022    Procedure: Coronary angiography;  Surgeon: Tonja Canales MD;  Location: [facility] INVASIVE LOCATION;  Service: Cardiovascular;  Laterality: Left;   • CARDIAC SURGERY      cardiac loop implant   • HERNIA REPAIR         Family History   Problem Relation Age of Onset   • Kidney disease Mother    • Sudden death Other    • Cancer Other        Social History     Socioeconomic History   • Marital status:    Tobacco Use   • Smoking status: Never   • Smokeless tobacco: Never   Vaping Use   • Vaping Use: Never used   Substance and Sexual Activity   • Alcohol use: Not Currently     Comment: daily   • Drug use: No   • Sexual activity: Defer         Current Outpatient Medications:   •  abiraterone acetate (ZYTIGA) 250 MG chemo tablet, Take  by mouth Daily. 4 tablets in the morning, Disp: , Rfl:   •  apixaban (Eliquis) 5 MG tablet  "tablet, Take 1 tablet by mouth Every 12 (Twelve) Hours., Disp: 60 tablet, Rfl: 11  •  Ascorbic Acid (Vitamin C) 500 MG capsule, Take 2 tablets by mouth Daily., Disp: , Rfl:   •  Cholecalciferol (VITAMIN D3) 5000 UNITS capsule capsule, Take 1 capsule by mouth Daily., Disp: , Rfl:   •  folic acid (FOLVITE) 800 MCG tablet, Take 400 mcg by mouth Daily., Disp: , Rfl:   •  furosemide (LASIX) 40 MG tablet, Take 1 tablet by mouth Daily., Disp: 30 tablet, Rfl: 11  •  levothyroxine (SYNTHROID, LEVOTHROID) 75 MCG tablet, Take 1 tablet by mouth Daily., Disp: , Rfl: 0  •  lisinopril (PRINIVIL,ZESTRIL) 10 MG tablet, Take 1 tablet by mouth Daily., Disp: 90 tablet, Rfl: 3  •  nitroglycerin (NITROSTAT) 0.4 MG SL tablet, 1 under the tongue as needed for angina, may repeat q5mins for up three doses, Disp: 25 tablet, Rfl: 11  •  potassium chloride 10 MEQ CR tablet, Take 1 tablet by mouth Daily., Disp: 30 tablet, Rfl: 11  •  predniSONE (DELTASONE) 5 MG tablet, Take 1 tablet by mouth 2 (Two) Times a Day., Disp: , Rfl:     Allergies:   No Known Allergies    Vital Signs: Blood pressure 114/60, pulse 73, height 180.3 cm (71\"), weight 103 kg (228 lb), SpO2 97 %.    Physical Exam     GEN: Well nourished, well-developed, no acute distress  HEENT: Normocephalic, atraumatic, moist mucous membranes  NECK: Supple, no JVD, no thyromegaly, no lymphadenopathy  CARD: Regular rate and rhythm, normal S1 & S2 are present.  No murmur, gallop or rubs are appreciated.  LUNGS: Clear to auscultation bilateraly, normal respiratory effort  ABDOMEN: Soft, nontender, normal bowel sounds  EXTREMITIES: No gross deformities, no clubbing, cyanosis.  No edema  SKIN: Warm, dry  NEURO: No focal deficits, alert and oriented x 3  PSYCHIATRIC: Normal affect and mood, appropriate use of semantics and logic.      Lab Results   Component Value Date    GLUCOSE 114 (H) 10/19/2022    CALCIUM 9.1 10/19/2022     (L) 10/19/2022    K 3.6 10/19/2022    CO2 26.0 10/19/2022    CL " 98 10/19/2022    BUN 16 10/19/2022    CREATININE 1.13 10/19/2022    EGFRIFAFRI 58 (L) 01/19/2022    EGFRIFNONA 47 (L) 01/19/2022    BCR 14.2 10/19/2022    ANIONGAP 8.0 10/19/2022     Lab Results   Component Value Date    WBC 9.36 05/11/2023    HGB 11.3 (L) 05/11/2023    HCT 33.2 (L) 05/11/2023    MCV 93 05/11/2023     05/11/2023     No results found for: INR, PROTIME  No results found for: TSH, W9OESDQ, H4TUNXS, THYROIDAB     Results for orders placed in visit on 12/20/18    SCANNED - ECHOCARDIOGRAM      I personally reviewed the tracings from his ambulatory monitor.  This showed a few episodes of ventricular tachycardia as detailed below.      Assessment & Plan    1. Nonsustained ventricular tachycardia  Previous Monitor was revived which shows a few runs of nonsustained VT.  The longest of these was about 15 beats.  The fastest of these was around 170 bpm.  I would not recommend further work-up for this in the absence of symptoms.    2. Longstanding persistent atrial fibrillation (HCC)  He does have a history of longstanding persistent atrial fibrillation.  He is currently on Eliquis for this.  He has had some issue with bleeding in the past.  We discussed the option of proceeding with a watchman implantation.  I think he would be a good candidate for this.  He is quite concerned about the cost that he would bear for this, as he currently has multiple medical bills that he is paying from his prior hospitalizations.  I will reach out to see if we have anybody that can help  him on the financial side of this.  We will discuss it further from there.    Follow Up:  No follow-ups on file.

## 2023-09-21 ENCOUNTER — TELEPHONE (OUTPATIENT)
Dept: CARDIOLOGY | Facility: CLINIC | Age: 88
End: 2023-09-21
Payer: MEDICARE

## 2023-09-21 NOTE — TELEPHONE ENCOUNTER
REQUEST FOR CARDIAC CLEARANCE    Name of person calling:DESIREE    Surgeon's name:PAU    Type of planned surgery:EXTRACTION    Date of planned surgery:WAITING ON CLEARANCE    Type of anesthesia:LOCAL    Have you been experiencing chest pain or shortness of breath?NO    Is your doctor requesting for you to stop any of your medications prior to your surgery?SHOULD HE STOP HIS MEDICATION?    Where should we fax the clearance to? [phone]

## 2023-10-04 ENCOUNTER — TELEPHONE (OUTPATIENT)
Dept: CARDIOLOGY | Facility: CLINIC | Age: 88
End: 2023-10-04
Payer: MEDICARE

## 2023-10-04 NOTE — TELEPHONE ENCOUNTER
Cardiac clearance request received from [facility] for Tooth # 9 extraction.  TBS.  Scanned to clearance folder and placed in Asya' box to be addressed with Sha Richards.

## 2024-02-16 ENCOUNTER — OFFICE VISIT (OUTPATIENT)
Dept: CARDIOLOGY | Facility: CLINIC | Age: 89
End: 2024-02-16
Payer: MEDICARE

## 2024-02-16 VITALS
BODY MASS INDEX: 32.2 KG/M2 | WEIGHT: 230 LBS | DIASTOLIC BLOOD PRESSURE: 76 MMHG | HEART RATE: 60 BPM | SYSTOLIC BLOOD PRESSURE: 154 MMHG | OXYGEN SATURATION: 100 % | HEIGHT: 71 IN

## 2024-02-16 DIAGNOSIS — I48.11 LONGSTANDING PERSISTENT ATRIAL FIBRILLATION: Primary | ICD-10-CM

## 2024-02-16 DIAGNOSIS — I47.29 NONSUSTAINED VENTRICULAR TACHYCARDIA: ICD-10-CM

## 2024-02-16 RX ORDER — MAGNESIUM CARB/ALUMINUM HYDROX 105-160MG
0.5 TABLET,CHEWABLE ORAL DAILY
COMMUNITY

## 2024-02-16 RX ORDER — GINKGO BILOBA 60 MG
1 TABLET ORAL DAILY
COMMUNITY

## 2024-02-17 PROBLEM — Z79.899 LONG TERM CURRENT USE OF ANTIARRHYTHMIC MEDICAL THERAPY: Status: RESOLVED | Noted: 2020-07-17 | Resolved: 2024-02-17

## 2024-03-01 ENCOUNTER — OFFICE VISIT (OUTPATIENT)
Dept: CARDIOLOGY | Facility: CLINIC | Age: 89
End: 2024-03-01
Payer: MEDICARE

## 2024-03-01 VITALS
SYSTOLIC BLOOD PRESSURE: 145 MMHG | WEIGHT: 245 LBS | BODY MASS INDEX: 34.3 KG/M2 | DIASTOLIC BLOOD PRESSURE: 59 MMHG | HEIGHT: 71 IN | OXYGEN SATURATION: 97 % | HEART RATE: 69 BPM

## 2024-03-01 DIAGNOSIS — I25.10 CORONARY ARTERY DISEASE INVOLVING NATIVE CORONARY ARTERY OF NATIVE HEART WITHOUT ANGINA PECTORIS: Primary | ICD-10-CM

## 2024-03-01 DIAGNOSIS — I10 PRIMARY HYPERTENSION: ICD-10-CM

## 2024-03-01 DIAGNOSIS — I48.91 ATRIAL FIBRILLATION, UNSPECIFIED TYPE: ICD-10-CM

## 2024-03-01 NOTE — PROGRESS NOTES
Advance Care Planning   ACP discussion was held with the patient during this visit. Patient has an advance directive in EMR which is still valid.    Problem list     Subjective   Martín Campa is a 88 y.o. male     Chief Complaint   Patient presents with    Follow-up     6 month follow up     Shortness of Breath     With activity   Chief complaint: Routine follow-up to review symptoms.      Problem list   1. Atrial fibrillation with RVR during hospitalization, October 2014.  1.1 Status post cardioversion, December 2014.  1.2 Residual A flutter, despite increased dosing of Flecainide.  1.3 Repeat Cardioversion, 09/2016, with maintenance of SR with Flecainide.  The patient has recently been discontinued from flecainide by EP services, per his report.  1.4 no recommendation for rate control given recurrent bradycardia dysrhythmic activity.  The patient is followed closely through EP services for the same at this time.  1.5 repeat event monitor October 2022 demonstrating chronic atrial fibrillation but multiple runs of nonsustained V. tach.  Slow ventricular response rates were noted during the study as well  2. 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 tach noted by event monitor, 10/2022.  This supported nonobstructive disease with at most 40% RCA disease noted.  Medical management was recommended.  Also noted was echocardiogram just prior to that examination, 9/2022, indicating normal systolic function.  3. Hypertension  4. Dyslipidemia      5. Prostate cancer      6. First degree AVB per previous telemetry.    HPI  The patient presents in the clinic today for follow-up.  He continues to follow with the EP services given history of A-fib and diffuse conduction system disease.  He was felt to be mostly stable by most recent evaluation by  the same.  From general cardiovascular standpoint, he denies angina.  Dyspnea and fatigue are at baseline, which he feels is related more to age and deconditioning.  He has no PND nor orthopnea.  Lower extremity edema is at baseline.  He would like to explore consideration for Watchman procedure, and has discussed this with his EP provider.  He has ongoing evaluation for the same through that service.  To his knowledge, laboratories have been normal to his primary care provider.  He typically is normotensive on current medical regimen.  He has no further complaints and feels that he is doing fairly well.    Current Outpatient Medications on File Prior to Visit   Medication Sig Dispense Refill    abiraterone acetate (ZYTIGA) 250 MG chemo tablet Take  by mouth Daily. 4 tablets in the morning      ascorbic acid (VITAMIN C) 1000 MG tablet Take 1-2 tablets by mouth Daily.      Cholecalciferol (VITAMIN D3) 5000 UNITS capsule capsule Take 1 capsule by mouth Daily.      folic acid (FOLVITE) 400 MCG tablet Take 1 tablet by mouth Daily.      furosemide (LASIX) 40 MG tablet Take 1 tablet by mouth Daily. 30 tablet 11    Ginkgo 60 MG tablet Take 1 tablet by mouth Daily.      Glucosamine-Chondroitin 750-600 MG tablet Take 0.5 tablets by mouth Daily.      levothyroxine (SYNTHROID, LEVOTHROID) 75 MCG tablet Take 1 tablet by mouth Daily.  0    lisinopril (PRINIVIL,ZESTRIL) 10 MG tablet Take 1 tablet by mouth Daily. 90 tablet 3    nitroglycerin (NITROSTAT) 0.4 MG SL tablet 1 under the tongue as needed for angina, may repeat q5mins for up three doses 25 tablet 11    potassium chloride 10 MEQ CR tablet Take 1 tablet by mouth Daily. 30 tablet 11    predniSONE (DELTASONE) 5 MG tablet Take 1 tablet by mouth 2 (Two) Times a Day.       No current facility-administered medications on file prior to visit.       Patient has no known allergies.    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   HENT: Negative.     Eyes:  Positive for visual disturbance (macular degeneration).   Respiratory:  Positive for shortness of breath. Negative for apnea, cough, chest tightness and wheezing.    Cardiovascular: Negative.  Negative for chest pain, palpitations and leg swelling.   Gastrointestinal:  Positive for blood in stool (reports blood in stool every day). Negative for abdominal pain.   Endocrine: Negative.    Genitourinary: Negative.  Negative for hematuria.   Musculoskeletal:  Positive for gait problem (has had 2 falls). Negative for arthralgias, back pain, myalgias, neck pain and neck stiffness.   Skin: Negative.  Negative for rash and wound.   Allergic/Immunologic: Negative.  Negative for environmental allergies and food allergies.   Neurological:  Positive for weakness (generalized). Negative for dizziness, syncope, light-headedness, numbness and headaches.   Hematological:  Bruises/bleeds easily (bruises easily is on eliquis).   Psychiatric/Behavioral: Negative.  Negative for sleep disturbance.        Objective   Vitals:    03/01/24 0933   BP: 145/59   Pulse: 69   SpO2: 97%   Weight: 111 kg (245 lb)   Height: 180.3 cm (71\")      /59   Pulse 69   Ht 180.3 cm (71\")   Wt 111 kg (245 lb)   SpO2 97%   BMI 34.17 kg/m²    Lab Results " (most recent)       None          Physical Exam  Vitals and nursing note reviewed.   Constitutional:       General: He is not in acute distress.     Appearance: He is well-developed.   HENT:      Head: Normocephalic and atraumatic.   Eyes:      Conjunctiva/sclera: Conjunctivae normal.      Pupils: Pupils are equal, round, and reactive to light.   Neck:      Vascular: No JVD.      Trachea: No tracheal deviation.   Cardiovascular:      Rate and Rhythm: Normal rate. Rhythm irregular.      Heart sounds: Normal heart sounds.   Pulmonary:      Effort: Pulmonary effort is normal.      Breath sounds: Normal breath sounds.   Abdominal:      General: Bowel sounds are normal. There is no distension.      Palpations: Abdomen is soft. There is no mass.      Tenderness: There is no abdominal tenderness. There is no guarding or rebound.   Musculoskeletal:         General: No tenderness or deformity. Normal range of motion.      Cervical back: Normal range of motion and neck supple.      Right lower le+      Left lower le+   Skin:     General: Skin is warm and dry.      Coloration: Skin is not pale.      Findings: No erythema or rash.   Neurological:      Mental Status: He is alert and oriented to person, place, and time.   Psychiatric:         Behavior: Behavior normal.         Thought Content: Thought content normal.         Judgment: Judgment normal.           Procedure   Procedures       Assessment & Plan      Diagnosis Plan   1. Coronary artery disease involving native coronary artery of native heart without angina pectoris        2. Atrial fibrillation, unspecified type  apixaban (Eliquis) 5 MG tablet tablet      3. Primary hypertension            1.  At this time, the patient appears to be doing reasonably well from general cardiovascular standpoint.  He denies any angina.  He has no failure nor significant dysrhythmic activity noted.    2.  He continues to be mostly normotensive when monitored at home.  He will  follow this closely and call for complications.    3.  He continues to follow with his EP provider regarding A-fib and diffuse conduction system disease noted historically.  Ongoing medical therapy has been recommended through that service.    4.  We have discussed consideration for lipid management medication.  He will consider this and call should he decide to institute the same.    5.  As the patient is clinically stable, nothing further.  We will follow along with the EP recommendations.  As he has no specific cardiovascular issues, nothing further.  We will continue medications and continue to see him on 6-month intervals.                 Electronically signed by:

## 2024-04-29 RX ORDER — LISINOPRIL 10 MG/1
10 TABLET ORAL DAILY
Qty: 90 TABLET | Refills: 3 | Status: SHIPPED | OUTPATIENT
Start: 2024-04-29

## 2024-06-06 ENCOUNTER — TELEPHONE (OUTPATIENT)
Dept: CARDIOLOGY | Facility: CLINIC | Age: 89
End: 2024-06-06
Payer: MEDICARE

## 2024-06-06 DIAGNOSIS — I48.91 ATRIAL FIBRILLATION, UNSPECIFIED TYPE: ICD-10-CM

## 2024-06-06 DIAGNOSIS — I10 PRIMARY HYPERTENSION: Primary | ICD-10-CM

## 2024-06-06 NOTE — TELEPHONE ENCOUNTER
Records reviewed by artemio wesley with recommendation's for patient to continue with lisinopril 5mg daily and eliquis 2.5 mg bid, monitor blood pressure readings and contact our office with new or worsening symptom's.     Called patient (due to number listed below states not in service,) spoke with patient's spouse whom seemed very confused and rambling about so many medication's and so many doctors states I'm confused, informed her that I will contact home health tomorrow and address with their Nurse Syl.

## 2024-06-06 NOTE — TELEPHONE ENCOUNTER
Syl w/ [facility] LVM stating pt was in the hospital and BP was systolic . Lisinopril decreased to 5mg daily and told to cut the 10mg in half. Stated tab is not scored.   Eliquis was changed to 2.5mg BID from 5mg BID. Stated they needed to confirm that we were okay w/ changes.   # [phone]        Called Syl's # and recording stated it was disconnected.   Called pt, asked what hospital he went to- pt stated he was at [facility] and that he was hospitalized for over 1 month. I asked why he was there initially, he stated he was admitted for a broken ankle and had some other issues that needed addressed while he was there.

## 2024-06-07 RX ORDER — LISINOPRIL 5 MG/1
5 TABLET ORAL DAILY
Qty: 30 TABLET | Refills: 11 | Status: SHIPPED | OUTPATIENT
Start: 2024-06-07

## 2024-06-07 NOTE — TELEPHONE ENCOUNTER
Spoke with [facility] home health nurse Jaylyn at [phone] (number below incorrect) with Sha Richards's recommendation's which are continue eliquis at 2.5 mg bid ( patient's spouse wanting to go back to 5 Mg bid) until we check kidney function BMP. Jaylyn aware and will check BMP at next visit Monday or Tuesday of next week. Orders entered. Rx for lisinopril 5 mg sent into pharmacy due to caregiver having trouble cutting 10 mg in half.

## 2024-06-10 NOTE — TELEPHONE ENCOUNTER
Notified home health nurse Jaylyn of recommendation's and of follow up appointment with Susan HUFFMAN.

## 2024-08-06 ENCOUNTER — TELEPHONE (OUTPATIENT)
Dept: CARDIOLOGY | Facility: CLINIC | Age: 89
End: 2024-08-06
Payer: MEDICARE

## 2024-08-06 NOTE — TELEPHONE ENCOUNTER
Caller: Martín Campa    Relationship to patient: Self    Best call back number: [phone]     Type of visit: FOLLOW UP    Requested date: ASAP     If rescheduling, when is the original appointment: 02/21/25     Additional notes:PATIENT CALLED IN TO SEE IF HE COULD GET A SOONER APPOINTMENT WITH DR. HENDRICKS. PATIENT WOULD LIKE TO GET IN WITH DR. HENDRICKS TO DISCUSS THE WATCHMAN PROCEDURE.

## 2024-09-20 ENCOUNTER — OFFICE VISIT (OUTPATIENT)
Dept: CARDIOLOGY | Facility: CLINIC | Age: 89
End: 2024-09-20
Payer: MEDICARE

## 2024-09-20 VITALS
DIASTOLIC BLOOD PRESSURE: 68 MMHG | HEIGHT: 71 IN | OXYGEN SATURATION: 98 % | HEART RATE: 75 BPM | WEIGHT: 210 LBS | BODY MASS INDEX: 29.4 KG/M2 | SYSTOLIC BLOOD PRESSURE: 128 MMHG

## 2024-09-20 DIAGNOSIS — I10 PRIMARY HYPERTENSION: ICD-10-CM

## 2024-09-20 DIAGNOSIS — I48.11 LONGSTANDING PERSISTENT ATRIAL FIBRILLATION: Primary | ICD-10-CM

## 2024-09-20 PROCEDURE — G2211 COMPLEX E/M VISIT ADD ON: HCPCS | Performed by: STUDENT IN AN ORGANIZED HEALTH CARE EDUCATION/TRAINING PROGRAM

## 2024-09-20 PROCEDURE — 99214 OFFICE O/P EST MOD 30 MIN: CPT | Performed by: STUDENT IN AN ORGANIZED HEALTH CARE EDUCATION/TRAINING PROGRAM

## 2024-10-28 ENCOUNTER — TELEPHONE (OUTPATIENT)
Dept: CARDIOLOGY | Facility: CLINIC | Age: 89
End: 2024-10-28

## 2024-10-28 NOTE — TELEPHONE ENCOUNTER
Patient would like to be seen sooner if possible, but he's okay with the appointment next week if nothing is available. Please get his records prior to his appointment.

## 2024-10-28 NOTE — TELEPHONE ENCOUNTER
Caller: Martín Campa    Relationship to patient: Self    Best call back number: [phone]     New or established patient?  [] New  [x] Established    Date of discharge: 10.28.24    Facility discharged from: [facility] [place]    Diagnosis/Symptoms: HYPOTENSION, ABNORMAL EKG, GI BLEED, FLAT LINE, SYNCOPE    Length of stay (If applicable): NA    Specialty Only: Did you see a [facility] health provider?    [] Yes  [x] No  If so, who? NA    Additional Details: PT WAS IN [facility] IN [place], [place] FOR HYPOTENSION, SYNCOPE, A GI BLEED IN WHICH HE HAD TO STOP ELIQUIS DUE TO, HAD AN ABNORMAL EKG, AND PT FLAT-LINED AND HAD TO BE BROUGHT BACK WHILE STAYING AT [facility]. PLEASE CALL PT AND GET THEM IN SOONER IF POSSIBLE. THEY ARE SCHEDULED FOR 11.6.24 NEXT WEEK.     IF NEED [facility] NUMBER:   [phone]

## 2024-10-30 ENCOUNTER — TELEPHONE (OUTPATIENT)
Dept: CARDIOLOGY | Facility: CLINIC | Age: 89
End: 2024-10-30

## 2024-10-30 NOTE — TELEPHONE ENCOUNTER
Caller: KatherynThuy    Relationship: Emergency Contact    Best call back number: [phone]    What is the best time to reach you: ANYTIME    Who are you requesting to speak with (clinical staff, provider,  specific staff member): CLINICAL    Do you know the name of the person who called: N/A    What was the call regarding: PATIENTS WIFE, THUY GAMEZ IS CALLING IN TO SEE IF PATIENTS APPOINTMENTS ON 11-6-24 AND 11-14-24 CAN BE COMBINED TO ONE APPOINTMENT WITH JESS REGAN.  PLEASE REACH OUT TO DISCUSS.    Is it okay if the provider responds through MyChart: CALL

## 2024-11-14 ENCOUNTER — OFFICE VISIT (OUTPATIENT)
Dept: CARDIOLOGY | Facility: CLINIC | Age: 89
End: 2024-11-14
Payer: MEDICARE

## 2024-11-14 VITALS
HEART RATE: 67 BPM | SYSTOLIC BLOOD PRESSURE: 164 MMHG | BODY MASS INDEX: 29.4 KG/M2 | DIASTOLIC BLOOD PRESSURE: 76 MMHG | OXYGEN SATURATION: 97 % | HEIGHT: 71 IN | WEIGHT: 210 LBS

## 2024-11-14 DIAGNOSIS — I48.11 LONGSTANDING PERSISTENT ATRIAL FIBRILLATION: Primary | ICD-10-CM

## 2024-11-14 DIAGNOSIS — R06.09 DYSPNEA ON EXERTION: ICD-10-CM

## 2024-11-14 DIAGNOSIS — I10 PRIMARY HYPERTENSION: ICD-10-CM

## 2024-11-14 RX ORDER — PANTOPRAZOLE SODIUM 40 MG/1
40 TABLET, DELAYED RELEASE ORAL DAILY
COMMUNITY
Start: 2024-10-16

## 2024-11-14 RX ORDER — LOSARTAN POTASSIUM 25 MG/1
1 TABLET ORAL DAILY
COMMUNITY

## 2024-11-14 RX ORDER — FERROUS SULFATE 325(65) MG
325 TABLET ORAL
COMMUNITY

## 2024-11-14 RX ORDER — MAGNESIUM OXIDE 400 MG/1
400 TABLET ORAL DAILY
COMMUNITY

## 2024-11-14 NOTE — PROGRESS NOTES
"Problem list     Subjective   Martín Campa is a 89 y.o. male     Chief Complaint   Patient presents with    Follow-up     [facility] follow up syncope - blood loss - eliquis is on hold. Has been at [facility] for therapy after hospital stay at    Problem list   1. Atrial fibrillation with RVR during hospitalization, October 2014.  1.1 Status post cardioversion, December 2014.  1.2 Residual A flutter, despite increased dosing of Flecainide.  1.3 Repeat Cardioversion, 09/2016, with maintenance of SR with Flecainide.  The patient has recently been discontinued from flecainide by EP services, per his report.  1.4 no recommendation for rate control given recurrent bradycardia dysrhythmic activity.  The patient is followed closely through EP services for the same at this time.  1.5 repeat event monitor October 2022 demonstrating chronic atrial fibrillation but multiple runs of nonsustained V. tach.  Slow ventricular response rates were noted during the study as well  2. 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 tach noted by event monitor, 10/2022.  This supported nonobstructive disease with at most 40% RCA disease noted.  Medical management was recommended.  Also noted was echocardiogram just prior to that examination, 9/2022, indicating normal systolic function.  3. Hypertension  4. Dyslipidemia      5. Prostate cancer    HPI  The patient presents in the clinic today for follow-up.  Since his last evaluation, he has had rather extensive issues.  He had a fall with subsequent ankle fracture.  He had prolonged rehab after the same.  Eventually, he started having GI bleeds with follow-up colonoscopy performed.  Apparently had \"a lesion\" which was removed.  Anticoagulation has been held and he has had no continued GI bleeding issues.  He would " really like to rechallenge the anticoagulation now for fear of potential CVA.  He apparently recently had an ER evaluation for dizziness per report.  I have asked him about this and he does not recall.  He has had wide fluctuation of pulse rates however.  He is interested in pursuing a monitor.  I did review echo from April of this year, during a separate hospitalization.  Echo supported normal systolic function with no significant valvular structural abnormalities.  His PA pressures were felt to be at 76 mmHg.  He tells me this was never addressed.  There is knowledge, symptoms of A-fib really are minimal and nonproblematic.  He has no further complaints.    Current Outpatient Medications on File Prior to Visit   Medication Sig Dispense Refill    abiraterone acetate (ZYTIGA) 250 MG chemo tablet Take  by mouth Daily. 4 tablets in the morning      ascorbic acid (VITAMIN C) 1000 MG tablet Take 1-2 tablets by mouth Daily.      Cholecalciferol (VITAMIN D3) 5000 UNITS capsule capsule Take 1 capsule by mouth Daily.      ferrous sulfate 325 (65 FE) MG tablet Take 1 tablet by mouth Daily With Breakfast.      folic acid (FOLVITE) 400 MCG tablet Take 1 tablet by mouth Daily.      furosemide (LASIX) 40 MG tablet Take 1 tablet by mouth Daily. 30 tablet 11    Ginkgo 60 MG tablet Take 1 tablet by mouth Daily.      Glucosamine-Chondroitin 750-600 MG tablet Take 0.5 tablets by mouth Daily.      levothyroxine (SYNTHROID, LEVOTHROID) 75 MCG tablet Take 1 tablet by mouth Daily.  0    magnesium oxide (MAG-OX) 400 MG tablet Take 1 tablet by mouth Daily.      nitroglycerin (NITROSTAT) 0.4 MG SL tablet 1 under the tongue as needed for angina, may repeat q5mins for up three doses 25 tablet 11    pantoprazole (PROTONIX) 40 MG EC tablet Take 1 tablet by mouth Daily.      potassium chloride 10 MEQ CR tablet Take 1 tablet by mouth Daily. 30 tablet 11    predniSONE (DELTASONE) 5 MG tablet Take 1 tablet by mouth 2 (Two) Times a Day.       Probiotic Product (PROBIOTIC DAILY PO) Take  by mouth.      apixaban (Eliquis) 5 MG tablet tablet Take 1 tablet by mouth Every 12 (Twelve) Hours. (Patient not taking: Reported on 11/14/2024) 60 tablet 11    losartan (COZAAR) 25 MG tablet Take 1 tablet by mouth Daily. (Patient not taking: Reported on 11/14/2024)      [DISCONTINUED] lisinopril (PRINIVIL,ZESTRIL) 5 MG tablet Take 1 tablet by mouth Daily. 30 tablet 11     No current facility-administered medications on file prior to visit.       Patient has no known allergies.    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 Negative for chills, diaphoresis and fever.   HENT: Negative.     Eyes: Negative.  Negative for visual disturbance.   Respiratory: Negative.  Negative for apnea, cough, chest tightness, shortness of breath and wheezing.    Cardiovascular: Negative.  Positive for leg swelling (RIGHT ANKLE HAD FRACTURE). Negative for chest pain and palpitations.   Gastrointestinal:  Negative for abdominal pain and blood in stool.   Endocrine: Negative.    Genitourinary: Negative.  Negative for hematuria.   Musculoskeletal:  Positive for arthralgias (SHOULDERS). Negative for back pain, myalgias, neck pain and neck stiffness.  "  Skin: Negative.  Negative for rash and wound.   Allergic/Immunologic: Negative.  Negative for environmental allergies and food allergies.   Neurological:  Positive for syncope, weakness and light-headedness. Negative for dizziness, numbness and headaches.   Hematological:  Bruises/bleeds easily.   Psychiatric/Behavioral: Negative.  Negative for sleep disturbance.        Objective   Vitals:    11/14/24 0954   BP: 164/76   Pulse: 67   SpO2: 97%   Weight: 95.3 kg (210 lb)   Height: 180.3 cm (71\")      /76   Pulse 67   Ht 180.3 cm (71\")   Wt 95.3 kg (210 lb)   SpO2 97%   BMI 29.29 kg/m²    Lab Results (most recent)       None          Physical Exam  Vitals and nursing note reviewed.   Constitutional:       General: He is not in acute distress.     Appearance: He is well-developed.   HENT:      Head: Normocephalic and atraumatic.   Eyes:      Conjunctiva/sclera: Conjunctivae normal.      Pupils: Pupils are equal, round, and reactive to light.   Neck:      Vascular: No JVD.      Trachea: No tracheal deviation.   Cardiovascular:      Rate and Rhythm: Normal rate and regular rhythm.      Heart sounds: Normal heart sounds.   Pulmonary:      Effort: Pulmonary effort is normal.      Breath sounds: Normal breath sounds.   Abdominal:      General: Bowel sounds are normal. There is no distension.      Palpations: Abdomen is soft. There is no mass.      Tenderness: There is no abdominal tenderness. There is no guarding or rebound.   Musculoskeletal:         General: No tenderness or deformity. Normal range of motion.      Cervical back: Normal range of motion and neck supple.   Skin:     General: Skin is warm and dry.      Coloration: Skin is not pale.      Findings: No erythema or rash.   Neurological:      Mental Status: He is alert and oriented to person, place, and time.   Psychiatric:         Behavior: Behavior normal.         Thought Content: Thought content normal.         Judgment: Judgment normal. " "          Procedure   Procedures       Assessment & Plan      Diagnosis Plan   1. Longstanding persistent atrial fibrillation  Adult Transthoracic Echo Limited W/ Cont if Necessary Per Protocol    Holter Monitor - 72 Hour Up To 15 Days      2. Dyspnea on exertion  Adult Transthoracic Echo Limited W/ Cont if Necessary Per Protocol    Holter Monitor - 72 Hour Up To 15 Days      3. Primary hypertension            1.  The patient presents in the clinic today for routine evaluation and follow-up.  I would like to repeat a limited echo.  My concern is that his echo from April suggested PA pressures of 76 mm.  I would like to repeat a limited echo specifically to evaluate pulmonary pressures and right-sided parameters otherwise.    2.  I would schedule for a 72-hour Holter monitor.  He is interested in pursuing this given some degree of rate variation recently at home.  He is also concerned because of previous dizzy episodes.  We need to ensure that this is not dysrhythmic in etiology.    3.  I have encouraged him to go ahead and restart the anticoagulation therapy.  This was held because of GI bleed.  Follow-up colonoscopy resulted in resection of \"lesion\".  He has had no further GI bleed issues.  I still would have low threshold in this specific patient for consideration of the left atrial appendage occluder given the entirety of his clinical scenario.  He has had GI bleed and would not be able to have transfusion at any point given [employer] affiliation.  I will defer this to his EP provider.    4.  No further evaluation would be indicated outside of the above.  No further adjustments will be made.  We will see him on routine 6-month intervals.         Advance Care Planning   ACP discussion was held with the patient during this visit. Patient has an advance directive (not in EMR), copy requested.           Electronically signed by:      "

## 2024-11-26 ENCOUNTER — HOSPITAL ENCOUNTER (OUTPATIENT)
Dept: CARDIOLOGY | Facility: HOSPITAL | Age: 89
Discharge: HOME | End: 2024-11-26
Admitting: PHYSICIAN ASSISTANT
Payer: MEDICARE

## 2024-11-26 DIAGNOSIS — I48.11 LONGSTANDING PERSISTENT ATRIAL FIBRILLATION: ICD-10-CM

## 2024-11-26 DIAGNOSIS — R06.09 DYSPNEA ON EXERTION: ICD-10-CM

## 2024-11-26 LAB
BH CV ECHO MEAS - ACS: 1.75 CM
BH CV ECHO MEAS - AO ROOT DIAM: 2.7 CM
BH CV ECHO MEAS - EDV(CUBED): 86.9 ML
BH CV ECHO MEAS - EDV(MOD-SP4): 76 ML
BH CV ECHO MEAS - EF(MOD-SP4): 52 %
BH CV ECHO MEAS - EF_3D-VOL: 54 %
BH CV ECHO MEAS - ESV(CUBED): 6.8 ML
BH CV ECHO MEAS - ESV(MOD-SP4): 36.5 ML
BH CV ECHO MEAS - FS: 57.3 %
BH CV ECHO MEAS - IVS/LVPW: 0.97 CM
BH CV ECHO MEAS - IVSD: 1.44 CM
BH CV ECHO MEAS - LA DIMENSION: 4.8 CM
BH CV ECHO MEAS - LV DIASTOLIC VOL/BSA (35-75): 35.3 CM2
BH CV ECHO MEAS - LV MASS(C)D: 258.6 GRAMS
BH CV ECHO MEAS - LV SYSTOLIC VOL/BSA (12-30): 17 CM2
BH CV ECHO MEAS - LVIDD: 4.4 CM
BH CV ECHO MEAS - LVIDS: 1.89 CM
BH CV ECHO MEAS - LVPWD: 1.48 CM
BH CV ECHO MEAS - RAP SYSTOLE: 10 MMHG
BH CV ECHO MEAS - RVDD: 2.9 CM
BH CV ECHO MEAS - RVSP: 68 MMHG
BH CV ECHO MEAS - SV(MOD-SP4): 39.5 ML
BH CV ECHO MEAS - SVI(MOD-SP4): 18.3 ML/M2
BH CV ECHO MEAS - TR MAX PG: 58 MMHG
BH CV ECHO MEAS - TR MAX VEL: 380.9 CM/SEC
BH CV XLRA - RV BASE: 4.4 CM
BH CV XLRA - RV LENGTH: 7.6 CM
BH CV XLRA - RV MID: 3.7 CM

## 2024-11-26 PROCEDURE — 93325 DOPPLER ECHO COLOR FLOW MAPG: CPT

## 2024-11-26 PROCEDURE — 93308 TTE F-UP OR LMTD: CPT

## 2024-11-26 PROCEDURE — 93321 DOPPLER ECHO F-UP/LMTD STD: CPT

## 2024-12-11 ENCOUNTER — TELEPHONE (OUTPATIENT)
Dept: CARDIOLOGY | Facility: CLINIC | Age: 89
End: 2024-12-11
Payer: MEDICARE

## 2024-12-11 DIAGNOSIS — I27.20 PULMONARY HYPERTENSION: Primary | ICD-10-CM

## 2024-12-11 NOTE — TELEPHONE ENCOUNTER
----- Message from Sha Richards sent at 12/9/2024 11:13 PM EST -----  Normal systolic function, mild to moderate LVH, and no significant valvular nor structural abnormalities otherwise.  PA pressures are elevated at 70 mmHg.  If he does not see a pulmonary hypertension clinic, I would recommend the same.  ----- Message -----  From: Nahid Rosario MD  Sent: 12/8/2024  12:47 PM EST  To: JESS Henderson    Adult Transthoracic Echo Limited W/ Cont if Necessary Per Protocol  Order: [id number]  Status: Final result       Visible to patient: Yes (not seen)       Dx: Dyspnea on exertion; Longstanding per...    0 Result Notes  Details    Reading Physician Reading Date Result Priority   Nahid Rosario MD  [phone] 12/8/2024 Routine     Result Text  Physician interpretation.  Technically adequate study.     1.  LV size, function, and wall motion are normal.  Mild to moderate concentric left ventricular hypertrophy.  Visually estimated ejection fraction is 60 to 65%.  Moderate biatrial enlargement.  Right ventricle is dilated but RV systolic function is grossly preserved.     2.  There is mild sclerosis of the mitral leaflets which have grossly preserved excursion.  Aortic valve is grossly morphologically normal.  The tricuspid valve is morphologically normal.     3.  There is a trivial posterior pericardial effusion with extends minimally to the posterior portion of the left atrium.  There is no evidence of tamponade physiology.  The proximal great vessels are unremarkable.     4.  RV and PA systolic pressures are approaching 70 mmHg.        [facility]   called patient no answer, left voice message requesting return call.

## 2025-01-20 ENCOUNTER — TELEPHONE (OUTPATIENT)
Dept: CARDIOLOGY | Facility: CLINIC | Age: 89
End: 2025-01-20
Payer: MEDICARE

## 2025-02-20 ENCOUNTER — TELEPHONE (OUTPATIENT)
Dept: CARDIOLOGY | Facility: CLINIC | Age: 89
End: 2025-02-20
Payer: MEDICARE

## 2025-02-20 NOTE — TELEPHONE ENCOUNTER
Caller: Martín Campa    Relationship to patient: Self    Best call back number: [phone]    Chief complaint: DUE TO WEATHER RELATED ISSUES AND HIS MOBILITY. HE WILL NOT BE ABLE TO ATTEND HIS APPT [place] WITH DR. ALEXANDRA TOMORROW AND WILL NEED TO RS.     Type of visit: 1 YEAR    Requested date: ASAP     If rescheduling, when is the original appointment: 02.21.25

## 2025-02-24 NOTE — TELEPHONE ENCOUNTER
Lvm of new appt date, time, location & who he will be seeing. Mailed out the appt reminder paper.

## 2025-03-03 ENCOUNTER — TELEPHONE (OUTPATIENT)
Dept: CARDIOLOGY | Facility: CLINIC | Age: 89
End: 2025-03-03
Payer: MEDICARE

## 2025-03-03 DIAGNOSIS — I48.91 ATRIAL FIBRILLATION, UNSPECIFIED TYPE: ICD-10-CM

## 2025-03-03 NOTE — TELEPHONE ENCOUNTER
Spoke with pharmacy made them aware he had script of Eliquis 5 daily from [facility], needs Eliquis 5 bid. States to resend in medication and they would get it approved.

## 2025-07-17 ENCOUNTER — TELEPHONE (OUTPATIENT)
Dept: CARDIOLOGY | Facility: CLINIC | Age: 89
End: 2025-07-17
Payer: MEDICARE

## 2025-07-17 NOTE — TELEPHONE ENCOUNTER
Called for medication reconciliation. Wife stated she has all the medications down on a card, and they will bring this to the appointment tomorrow.

## 2025-07-18 ENCOUNTER — OFFICE VISIT (OUTPATIENT)
Dept: CARDIOLOGY | Facility: CLINIC | Age: 89
End: 2025-07-18
Payer: MEDICARE

## 2025-07-18 VITALS
HEART RATE: 78 BPM | OXYGEN SATURATION: 99 % | DIASTOLIC BLOOD PRESSURE: 89 MMHG | HEIGHT: 72 IN | SYSTOLIC BLOOD PRESSURE: 141 MMHG | WEIGHT: 202 LBS | BODY MASS INDEX: 27.36 KG/M2

## 2025-07-18 DIAGNOSIS — I47.29 NONSUSTAINED VENTRICULAR TACHYCARDIA: Chronic | ICD-10-CM

## 2025-07-18 DIAGNOSIS — I48.11 LONGSTANDING PERSISTENT ATRIAL FIBRILLATION: Primary | Chronic | ICD-10-CM

## 2025-07-18 PROCEDURE — G2211 COMPLEX E/M VISIT ADD ON: HCPCS

## 2025-07-18 PROCEDURE — 1159F MED LIST DOCD IN RCRD: CPT

## 2025-07-18 PROCEDURE — 99214 OFFICE O/P EST MOD 30 MIN: CPT

## 2025-07-18 PROCEDURE — 1160F RVW MEDS BY RX/DR IN RCRD: CPT

## 2025-07-18 RX ORDER — ROSUVASTATIN CALCIUM 20 MG/1
20 TABLET, COATED ORAL DAILY
COMMUNITY

## 2025-07-18 RX ORDER — EPLERENONE 25 MG/1
25 TABLET ORAL DAILY
COMMUNITY

## 2025-07-22 NOTE — PROGRESS NOTES
Martín Campa  [id number]  1935  [phone]      [facility] CARDIOLOG[facility]     Referring Provider: No ref. provider found     Lino Burnett MD  [address] [place] [address] [address] [place], [place] [id number]    Chief Complaint   Patient presents with    Longstanding persistent atrial fibrillation       Problem List  Long standing persistent atrial fibrillation  [id number], (age, HTN, VASC), anticoagulated with Eliquis     Coronary artery disease  Hypertension  Hyperlipidemia  GI bleeding   Prostate cancer    History of Present Illness   Martín Campa is a 89 y.o. male who presents to my electrophysiology clinic for follow up of permanent atrial fibrillation and NSVT.  Since we last saw the patient, he denies palpitations, shortness of breath, LH, dizziness and syncope. He went to  to get a second opinion about LAAO. He has been taking amiodarone but remains in AF. He was supposed to stop this at his last appt. On eliquis and denies any bleeding complications.     Outpatient Medications Marked as Taking for the 7/18/25 encounter (Office Visit) with Rocio Matson APRN   Medication Sig Dispense Refill    abiraterone acetate (ZYTIGA) 250 MG chemo tablet Take  by mouth Daily. 4 tablets in the morning      apixaban (Eliquis) 5 MG tablet tablet Take 1 tablet by mouth Every 12 (Twelve) Hours. 180 tablet 3    ascorbic acid (VITAMIN C) 1000 MG tablet Take 1-2 tablets by mouth Daily.      Cholecalciferol (VITAMIN D3) 5000 UNITS capsule capsule Take 1 capsule by mouth Daily.      eplerenone (INSPRA) 25 MG tablet Take 1 tablet by mouth Daily.      ferrous sulfate 325 (65 FE) MG tablet Take 1 tablet by mouth Daily With Breakfast.      folic acid (FOLVITE) 400 MCG tablet Take 1 tablet by mouth Daily.      furosemide (LASIX) 40 MG tablet Take 1 tablet by mouth Daily. 30 tablet 11    Ginkgo 60 MG tablet Take 1 tablet by mouth Daily.      Glucosamine-Chondroitin 750-600 MG tablet Take 0.5 tablets by mouth  "Daily.      levothyroxine (SYNTHROID, LEVOTHROID) 75 MCG tablet Take 1 tablet by mouth Daily.  0    nitroglycerin (NITROSTAT) 0.4 MG SL tablet 1 under the tongue as needed for angina, may repeat q5mins for up three doses 25 tablet 11    predniSONE (DELTASONE) 5 MG tablet Take 1 tablet by mouth 2 (Two) Times a Day.      Probiotic Product (PROBIOTIC DAILY PO) Take  by mouth.      rosuvastatin (CRESTOR) 20 MG tablet Take 1 tablet by mouth Daily.              Physical Exam  Vitals:    07/18/25 1446   BP: 141/89   BP Location: Right arm   Patient Position: Sitting   Pulse: 78   SpO2: 99%   Weight: 91.6 kg (202 lb)   Height: 182.9 cm (72\")     Body mass index is 27.4 kg/m².  Constitutional:       Appearance: Healthy appearance.   Neck:      Vascular: JVD normal.   Pulmonary:      Effort: Pulmonary effort is normal.      Breath sounds: Normal breath sounds.   Cardiovascular:      Normal rate. Regular rhythm. Normal S1. Normal S2.       Murmurs: There is no murmur.      No gallop.  No rub.   Pulses:     Intact distal pulses.   Edema:     Peripheral edema absent.   Neurological:      General: No focal deficit present.          Diagnostic Data  Procedures    Lab Results   Component Value Date    GLUCOSE 114 (H) 10/19/2022    CALCIUM 9.1 10/19/2022     (L) 10/19/2022    K 3.5 (L) 10/09/2024    CO2 26.0 10/19/2022    CL 98 10/19/2022    BUN 16 10/19/2022    CREATININE 1.13 10/19/2022    EGFRIFAFRI 58 (L) 01/19/2022    EGFRIFNONA 47 (L) 01/19/2022    BCR 14.2 10/19/2022    ANIONGAP 8.0 10/19/2022     Lab Results   Component Value Date    WBC 12.42 (H) 03/24/2025    HGB 12.1 (L) 03/24/2025    HCT 36.6 (L) 03/24/2025    MCV 95 03/24/2025     03/24/2025     Lab Results   Component Value Date    INR 1.3 (H) 09/27/2024     No results found for: \"TSH\", \"P5UTJAP\", \"N4UYYYK\", \"THYROIDAB\"    I personally viewed and interpreted the patient's EKG/Telemetry/lab data    Martín Campa  reports that he has never smoked. He has " never used smokeless tobacco. I have educated him on the risk of diseases from using tobacco products such as cancer, COPD, and heart disease.     ACP discussion was declined by the patient. Patient does not have an advance directive, declines further assistance.    Assessment and Plan  Diagnoses and all orders for this visit:    1. Longstanding persistent atrial fibrillation (Primary)    2. Nonsustained ventricular tachycardia      Persistent atrial fibrillation  -rate controlled due to patient being asymptomatic and having multiple comorbidities.   -Patient has been to  for second opinion for LAAO. He was also told that risks would most likely outweigh benefits. Patient is currently on blood thinners without any issues.   -Stop amiodarone. Medication was supposed to be stopped at previous visit.     Nonsustained ventricular tachycardia  -monitor with a few short runs of NSVT.   -C 10/2022 showed mild-mod nonobstructive disease.   -No further workup warranted.    GI Bleeding  -Currently stable on eliquis      Follow-Up  No follow-ups on file. Patient wants to call for his follow up appt.       Thank you for allowing me to participate in the care of your patient. Please to not hesitate to contact me with additional questions or concerns.

## 2025-07-31 ENCOUNTER — OFFICE VISIT (OUTPATIENT)
Dept: CARDIOLOGY | Facility: CLINIC | Age: 89
End: 2025-07-31
Payer: MEDICARE

## 2025-07-31 VITALS
HEIGHT: 72 IN | WEIGHT: 206 LBS | HEART RATE: 82 BPM | OXYGEN SATURATION: 98 % | DIASTOLIC BLOOD PRESSURE: 63 MMHG | SYSTOLIC BLOOD PRESSURE: 135 MMHG | BODY MASS INDEX: 27.9 KG/M2

## 2025-07-31 DIAGNOSIS — I48.11 LONGSTANDING PERSISTENT ATRIAL FIBRILLATION: Primary | ICD-10-CM

## 2025-07-31 DIAGNOSIS — I27.20 PULMONARY HYPERTENSION: ICD-10-CM

## 2025-07-31 DIAGNOSIS — R06.09 DYSPNEA ON EXERTION: ICD-10-CM

## 2025-07-31 DIAGNOSIS — I10 PRIMARY HYPERTENSION: ICD-10-CM

## 2025-07-31 PROCEDURE — 1160F RVW MEDS BY RX/DR IN RCRD: CPT | Performed by: PHYSICIAN ASSISTANT

## 2025-07-31 PROCEDURE — 99213 OFFICE O/P EST LOW 20 MIN: CPT | Performed by: PHYSICIAN ASSISTANT

## 2025-07-31 PROCEDURE — 1159F MED LIST DOCD IN RCRD: CPT | Performed by: PHYSICIAN ASSISTANT

## 2025-07-31 RX ORDER — CIPROFLOXACIN 500 MG/1
500 TABLET, FILM COATED ORAL 2 TIMES DAILY
COMMUNITY
Start: 2025-07-29

## 2025-07-31 NOTE — PROGRESS NOTES
Problem list     Subjective   Martín Campa is a 89 y.o. male     Chief Complaint   Patient presents with    Follow-up     Echo / Monitor results     Patient reports has started radiation treatments this past Tuesday. Has had recent hospitalization for UTI.   Problem list   1. Atrial fibrillation with RVR during hospitalization, October 2014.  1.1 Status post cardioversion, December 2014.  1.2 Residual A flutter, despite increased dosing of Flecainide.  1.3 Repeat Cardioversion, 09/2016, with maintenance of SR with Flecainide.  The patient has recently been discontinued from flecainide by EP services, per his report.  1.4 no recommendation for rate control given recurrent bradycardia dysrhythmic activity.  The patient is followed closely through EP services for the same at this time.  1.5 repeat event monitor October 2022 demonstrating chronic atrial fibrillation but multiple runs of nonsustained V. tach.  Slow ventricular response rates were noted during the study as well  2. 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 tach noted by event monitor, 10/2022.  This supported nonobstructive disease with at most 40% RCA disease noted.  Medical management was recommended.  Also noted was echocardiogram just prior to that examination, 9/2022, indicating normal systolic function.  3. Hypertension  4. Dyslipidemia      5. Prostate cancer    HPI  The patient presents in the clinic today for follow-up.  He just is not doing well.  He has now been started on radiation for metastatic prostate cancer.  He notes that with previous treatment of this he developed significant weakness and fatigue.  He never really improved after ankle fracture, furthering his overall weakness from deconditioning.  From general cardiovascular standpoint, he feels that he  has done fairly well.  He really has no symptoms of A-fib.  He denies any angina.  He continues to see EP services, who feels that the patient is doing well.  There was discussion of left atrial appendage occluder at some point.  He is not felt a candidate to have that placed at this point.  He also sees Dr. Bates at [facility] for pulmonary hypertension.  All services feel that the patient is at baseline.  He has no specific cardiac complaints at this time.    Current Outpatient Medications on File Prior to Visit   Medication Sig Dispense Refill    abiraterone acetate (ZYTIGA) 250 MG chemo tablet Take  by mouth Daily. 4 tablets in the morning      apixaban (Eliquis) 5 MG tablet tablet Take 1 tablet by mouth Every 12 (Twelve) Hours. 180 tablet 3    ascorbic acid (VITAMIN C) 1000 MG tablet Take 1-2 tablets by mouth Daily.      Cholecalciferol (VITAMIN D3) 5000 UNITS capsule capsule Take 1 capsule by mouth Daily.      ciprofloxacin (CIPRO) 500 MG tablet Take 1 tablet by mouth 2 (Two) Times a Day.      eplerenone (INSPRA) 25 MG tablet Take 1 tablet by mouth Daily.      ferrous sulfate 325 (65 FE) MG tablet Take 1 tablet by mouth Daily With Breakfast.      folic acid (FOLVITE) 400 MCG tablet Take 1 tablet by mouth Daily.      furosemide (LASIX) 40 MG tablet Take 1 tablet by mouth Daily. 30 tablet 11    Ginkgo 60 MG tablet Take 1 tablet by mouth Daily.      Glucosamine-Chondroitin 750-600 MG tablet Take 0.5 tablets by mouth Daily.      levothyroxine (SYNTHROID, LEVOTHROID) 75 MCG tablet Take 1 tablet by mouth Daily.  0    nitroglycerin (NITROSTAT) 0.4 MG SL tablet 1 under the tongue as needed for angina, may repeat q5mins for up three doses 25 tablet 11    predniSONE (DELTASONE) 5 MG tablet Take 1 tablet by mouth 2 (Two) Times a Day.      Probiotic Product (PROBIOTIC DAILY PO) Take  by mouth.      rosuvastatin (CRESTOR) 20 MG tablet Take 1 tablet by mouth Daily.      pantoprazole (PROTONIX) 40 MG EC tablet  Take 1 tablet by mouth Daily.      [DISCONTINUED] losartan (COZAAR) 25 MG tablet Take 1 tablet by mouth Daily. (Patient not taking: Reported on 7/18/2025)      [DISCONTINUED] magnesium oxide (MAG-OX) 400 MG tablet Take 1 tablet by mouth Daily. (Patient not taking: Reported on 7/18/2025)      [DISCONTINUED] potassium chloride 10 MEQ CR tablet Take 1 tablet by mouth Daily. (Patient not taking: Reported on 7/18/2025) 30 tablet 11     No current facility-administered medications on file prior to visit.       Patient has no known allergies.    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 Negative for chills, diaphoresis and fever.   HENT: Negative.  Negative for hearing loss.    Eyes: Negative.  Negative for visual disturbance.   Respiratory: Negative.  Negative for apnea, cough, chest tightness, shortness of breath and wheezing.    Cardiovascular:  Positive for leg swelling. Negative for chest pain and palpitations.   Gastrointestinal: Negative.  Negative for abdominal pain and blood in stool.   Endocrine: Negative.    Genitourinary: Negative.  Negative for hematuria.   Musculoskeletal:  Positive for arthralgias, back pain, gait problem (unsteady gait,  "ambulates with walker), myalgias, neck pain and neck stiffness.   Skin: Negative.  Negative for rash and wound.   Allergic/Immunologic: Negative.  Negative for environmental allergies and food allergies.   Neurological:  Positive for weakness. Negative for dizziness, syncope, light-headedness, numbness and headaches.   Hematological:  Bruises/bleeds easily (on eliquis).   Psychiatric/Behavioral: Negative.  Negative for sleep disturbance.        Objective   Vitals:    25 1018   BP: 135/63   Pulse: 82   SpO2: 98%   Weight: 93.4 kg (206 lb)   Height: 182.9 cm (72\")      /63   Pulse 82   Ht 182.9 cm (72\")   Wt 93.4 kg (206 lb)   SpO2 98%   BMI 27.94 kg/m²    Lab Results (most recent)       None          Physical Exam  Vitals and nursing note reviewed.   Constitutional:       General: He is not in acute distress (Patient uses rolling walker.).     Appearance: He is well-developed.   HENT:      Head: Normocephalic and atraumatic.   Eyes:      Conjunctiva/sclera: Conjunctivae normal.      Pupils: Pupils are equal, round, and reactive to light.   Neck:      Vascular: No JVD.      Trachea: No tracheal deviation.   Cardiovascular:      Rate and Rhythm: Normal rate. Rhythm irregular.      Heart sounds: Normal heart sounds.      Comments: Soft systolic murmur mid lower left sternal border.  Pulmonary:      Effort: Pulmonary effort is normal.      Breath sounds: Normal breath sounds.   Abdominal:      General: Bowel sounds are normal. There is no distension.      Palpations: Abdomen is soft. There is no mass.      Tenderness: There is no abdominal tenderness. There is no guarding or rebound.   Musculoskeletal:         General: No tenderness or deformity. Normal range of motion.      Cervical back: Normal range of motion and neck supple.      Right lower le+ Edema present.      Left lower le+ Edema present.   Skin:     General: Skin is warm and dry.      Coloration: Skin is not pale.      Findings: No " erythema or rash.   Neurological:      Mental Status: He is alert and oriented to person, place, and time.   Psychiatric:         Behavior: Behavior normal.         Thought Content: Thought content normal.         Judgment: Judgment normal.           Procedure   Procedures       Assessment & Plan      Diagnosis Plan   1. Longstanding persistent atrial fibrillation        2. Pulmonary hypertension        3. Dyspnea on exertion        4. Primary hypertension          1.  At this time, the patient feels that he is doing well from general cardiac standpoint.  He is asymptomatic of A-fib.  He denies angina.  Dyspnea is at baseline.  He has no specific concerns from general cardiac standpoint at this time.    2.  Blood pressures appear to be very well-maintained.  We will continue medical therapy without change.    3.  For change in clinical course he will return.  Otherwise we will continue to see him on routine 6 to 9-month intervals.           Martín Campa  reports that he has never smoked. He has never used smokeless tobacco.       Advance Care Planning   ACP discussion was held with the patient during this visit. Patient has an advance directive in EMR which is still valid.             Electronically signed by:

## (undated) DEVICE — MODEL AT P65, P/N 701554-001KIT CONTENTS: HAND CONTROLLER, 3-WAY HIGH-PRESSURE STOPCOCK WITH ROTATING END AND PREMIUM HIGH-PRESSURE TUBING: Brand: ANGIOTOUCH® KIT

## (undated) DEVICE — GLIDESHEATH SLENDER STAINLESS STEEL KIT: Brand: GLIDESHEATH SLENDER

## (undated) DEVICE — ANGIOGRAPHIC CATHETER: Brand: EXPO™

## (undated) DEVICE — CATH DIAG EXPO .045 FL3.5 5F 100CM

## (undated) DEVICE — PK CATH CARD 10

## (undated) DEVICE — DEV COMP RAD PRELUDESYNC 24CM

## (undated) DEVICE — MODEL BT2000 P/N 700287-012KIT CONTENTS: MANIFOLD WITH SALINE AND CONTRAST PORTS, SALINE TUBING WITH SPIKE AND HAND SYRINGE, TRANSDUCER: Brand: BT2000 AUTOMATED MANIFOLD KIT